# Patient Record
Sex: FEMALE | Race: WHITE | HISPANIC OR LATINO | Employment: UNEMPLOYED | URBAN - METROPOLITAN AREA
[De-identification: names, ages, dates, MRNs, and addresses within clinical notes are randomized per-mention and may not be internally consistent; named-entity substitution may affect disease eponyms.]

---

## 2021-02-05 ENCOUNTER — OFFICE VISIT (OUTPATIENT)
Dept: URGENT CARE | Facility: URGENT CARE | Age: 31
End: 2021-02-05
Payer: MEDICAID

## 2021-02-05 VITALS
HEIGHT: 60 IN | DIASTOLIC BLOOD PRESSURE: 66 MMHG | WEIGHT: 192 LBS | TEMPERATURE: 99.2 F | HEART RATE: 98 BPM | SYSTOLIC BLOOD PRESSURE: 120 MMHG | OXYGEN SATURATION: 99 % | BODY MASS INDEX: 37.69 KG/M2 | RESPIRATION RATE: 14 BRPM

## 2021-02-05 DIAGNOSIS — D50.0 IRON DEFICIENCY ANEMIA DUE TO CHRONIC BLOOD LOSS: ICD-10-CM

## 2021-02-05 DIAGNOSIS — N92.1 MENORRHAGIA WITH IRREGULAR CYCLE: ICD-10-CM

## 2021-02-05 DIAGNOSIS — M25.461 EFFUSION OF RIGHT KNEE: Primary | ICD-10-CM

## 2021-02-05 LAB
BASOPHILS # BLD AUTO: 0 10E9/L (ref 0–0.2)
BASOPHILS NFR BLD AUTO: 0.2 %
DIFFERENTIAL METHOD BLD: ABNORMAL
EOSINOPHIL # BLD AUTO: 0.1 10E9/L (ref 0–0.7)
EOSINOPHIL NFR BLD AUTO: 1.4 %
ERYTHROCYTE [DISTWIDTH] IN BLOOD BY AUTOMATED COUNT: 17.9 % (ref 10–15)
HCT VFR BLD AUTO: 31.5 % (ref 35–47)
HGB BLD-MCNC: 9.2 G/DL (ref 11.7–15.7)
LYMPHOCYTES # BLD AUTO: 3 10E9/L (ref 0.8–5.3)
LYMPHOCYTES NFR BLD AUTO: 32 %
MCH RBC QN AUTO: 19.2 PG (ref 26.5–33)
MCHC RBC AUTO-ENTMCNC: 29.2 G/DL (ref 31.5–36.5)
MCV RBC AUTO: 66 FL (ref 78–100)
MONOCYTES # BLD AUTO: 0.8 10E9/L (ref 0–1.3)
MONOCYTES NFR BLD AUTO: 8.8 %
NEUTROPHILS # BLD AUTO: 5.4 10E9/L (ref 1.6–8.3)
NEUTROPHILS NFR BLD AUTO: 57.6 %
PLATELET # BLD AUTO: 261 10E9/L (ref 150–450)
RBC # BLD AUTO: 4.8 10E12/L (ref 3.8–5.2)
WBC # BLD AUTO: 9.4 10E9/L (ref 4–11)

## 2021-02-05 PROCEDURE — 36415 COLL VENOUS BLD VENIPUNCTURE: CPT | Performed by: FAMILY MEDICINE

## 2021-02-05 PROCEDURE — 99203 OFFICE O/P NEW LOW 30 MIN: CPT | Performed by: FAMILY MEDICINE

## 2021-02-05 PROCEDURE — 85025 COMPLETE CBC W/AUTO DIFF WBC: CPT | Performed by: FAMILY MEDICINE

## 2021-02-05 RX ORDER — FERROUS GLUCONATE 324(38)MG
324 TABLET ORAL
Qty: 30 TABLET | Refills: 0 | Status: SHIPPED | OUTPATIENT
Start: 2021-02-05 | End: 2021-06-18

## 2021-02-05 ASSESSMENT — MIFFLIN-ST. JEOR: SCORE: 1499.47

## 2021-02-05 ASSESSMENT — PAIN SCALES - GENERAL: PAINLEVEL: SEVERE PAIN (6)

## 2021-02-06 NOTE — PROGRESS NOTES
"SUBJECTIVE:  Patient presents with rt knee swelling for one week with no known injury.  No increased activity.  No fever or hx of arthritis.    History reviewed. No pertinent past medical history.  Current Outpatient Medications   Medication Sig Dispense Refill     ferrous gluconate (FERGON) 324 (38 Fe) MG tablet Take 1 tablet (324 mg) by mouth daily (with breakfast) 30 tablet 0     History   Smoking Status     Never Smoker   Smokeless Tobacco     Never Used         OBJECITVE;  /66 (BP Location: Right arm, Patient Position: Chair, Cuff Size: Adult Large)   Pulse 98   Temp 99.2  F (37.3  C) (Tympanic)   Resp 14   Ht 1.511 m (4' 11.5\")   Wt 87.1 kg (192 lb)   LMP 02/05/2021 (Exact Date)   SpO2 99%   Breastfeeding No   BMI 38.13 kg/m    Alert oriented mild distress  Cardiac: normal  Chest: clear  Ortho:  Rt knee effusion without redness or warmth.  Symmetrical effusion without Baker's Cyst.  Bears wt well with slight limp.              jt is stable  CBC:  WBC and diff normal, but  Patient is anemic, further questioning, patient has significant menorrhagia hgb now  9.2  ASSESSMENT:  Rt knee effusion  menorrhagia  Iron deficiency anemia secondary to chronic blood loss.  PLAN:  Elevate knee, neoprene knee support, ferrous gluconate for anemia.  Ortho referral placed.  Follow up with primary care provider re menorrhagia and anemia.                                                                               "

## 2021-02-10 ENCOUNTER — OFFICE VISIT (OUTPATIENT)
Dept: ORTHOPEDICS | Facility: CLINIC | Age: 31
End: 2021-02-10
Payer: MEDICAID

## 2021-02-10 ENCOUNTER — ANCILLARY PROCEDURE (OUTPATIENT)
Dept: GENERAL RADIOLOGY | Facility: CLINIC | Age: 31
End: 2021-02-10
Attending: ORTHOPAEDIC SURGERY
Payer: MEDICAID

## 2021-02-10 VITALS
HEART RATE: 97 BPM | BODY MASS INDEX: 38.72 KG/M2 | WEIGHT: 197.2 LBS | SYSTOLIC BLOOD PRESSURE: 128 MMHG | DIASTOLIC BLOOD PRESSURE: 81 MMHG | HEIGHT: 60 IN

## 2021-02-10 DIAGNOSIS — M25.461 EFFUSION OF RIGHT KNEE: ICD-10-CM

## 2021-02-10 DIAGNOSIS — M25.561 ACUTE PAIN OF RIGHT KNEE: Primary | ICD-10-CM

## 2021-02-10 DIAGNOSIS — M25.561 ACUTE PAIN OF RIGHT KNEE: ICD-10-CM

## 2021-02-10 PROCEDURE — 99203 OFFICE O/P NEW LOW 30 MIN: CPT | Mod: 25 | Performed by: ORTHOPAEDIC SURGERY

## 2021-02-10 PROCEDURE — 20610 DRAIN/INJ JOINT/BURSA W/O US: CPT | Mod: RT | Performed by: ORTHOPAEDIC SURGERY

## 2021-02-10 PROCEDURE — 73562 X-RAY EXAM OF KNEE 3: CPT | Mod: RT | Performed by: RADIOLOGY

## 2021-02-10 RX ORDER — IBUPROFEN 200 MG
200 TABLET ORAL EVERY 4 HOURS PRN
COMMUNITY
End: 2021-02-18

## 2021-02-10 RX ADMIN — BUPIVACAINE HYDROCHLORIDE 4 ML: 2.5 INJECTION, SOLUTION INFILTRATION; PERINEURAL at 17:19

## 2021-02-10 ASSESSMENT — PAIN SCALES - GENERAL: PAINLEVEL: SEVERE PAIN (6)

## 2021-02-10 ASSESSMENT — MIFFLIN-ST. JEOR: SCORE: 1523.05

## 2021-02-10 NOTE — LETTER
"    2/10/2021         RE: Denise Cazares  [address] [facility] [address]        Dear Colleague,    Thank you for referring your patient, Denise Cazares, to the [facility]. Please see a copy of my visit note below.    CHIEF COMPLAINT:   Chief Complaint   Patient presents with     Right Knee - Pain     Onset: about 2 weeks. NKI. Pain came on gradual and has gotten worse. Pain is lateral and superior to knee cap. Pain comes and goes. After having it elevated for a while her leg becomes numb, also with walking. No tx. She was seen      Knee Pain     in  and was given a knee sleeve that she has been wearing. She takes ibuprofen as needed.   .        HISTORY OF PRESENT ILLNESS    Denise Cazares is a 31 year old female seen for evaluation of ongoing right knee pain with no known injury.   Pain has been present for 2 weeks. Has had some swelling. Pain has increased. Locates pain along the side and top of the knee. Pain comes and goes. When she elevates, the leg becomes numb. The leg will become numb with walking as well. She's been wearing a knee sleeve from urgent care. She takes ibuprofen as needed. She thinks the swelling has improved the past couple of days and is able to lift her leg and move around easier. Ok at rest.    Denies fevers, chills, night sweats. She's not felt ill or had any recent illness.    Reports her knees \"crunch\" frequently but no issues in the past.    Present symptoms: pain diffuse, pain dull/achy , moderate pain, moderate  swelling, no catching/popping, no locking, no giving way.    Pain severity: 6/10  Frequency of symptoms: are constant  Exacerbating Factors: weight bearing, stairs, bending the knee.  Relieving Factors: elevation  Pain while at rest: No   Numbness or tingling: Yes   Patient has tried:     NSAIDS: Yes      Physical Therapy: No      Activity modification: Yes      Bracing: Yes      Injections: No      Ice: Yes      Assistive " "device:  No      Other PMH:  has no past medical history on file.  There is no problem list on file for this patient.      Surgical Hx:  has no past surgical history on file.    Medications:   Current Outpatient Medications:      ferrous gluconate (FERGON) 324 (38 Fe) MG tablet, Take 1 tablet (324 mg) by mouth daily (with breakfast), Disp: 30 tablet, Rfl: 0    Allergies: No Known Allergies    Social Hx: unemployed.   reports that she has never smoked. She has never used smokeless tobacco. She reports current alcohol use. She reports that she does not use drugs.    Family Hx: family history is not on file.    REVIEW OF SYSTEMS: 10 point ROS neg other than the symptoms noted above in the HPI and PMH. Notables include  CONSTITUTIONAL:NEGATIVE for fever, chills, change in weight  INTEGUMENTARY/SKIN: NEGATIVE for worrisome rashes, moles or lesions  MUSCULOSKELETAL:See HPI above  NEURO: NEGATIVE for weakness, dizziness or paresthesias    PHYSICAL EXAM:  /81   Pulse 97   Ht 1.511 m (4' 11.5\")   Wt 89.4 kg (197 lb 3.2 oz)   LMP 02/05/2021 (Exact Date)   BMI 39.16 kg/m     GENERAL APPEARANCE: healthy, alert, no distress  SKIN: no suspicious lesions or rashes  NEURO: Normal strength and tone, mentation intact and speech normal  PSYCH:  mentation appears normal and affect normal, not anxious  RESPIRATORY: No increased work of breathing.  HANDS: no clubbing, nail pitting  LYMPH: no palpable popliteal lymphadenopathy.    BILATERAL LOWER EXTREMITIES:  Gait: antalgic favoring right   Alignment: valgus  No gross deformities or masses.  No Quad atrophy, strength normal.  Intact sensation deep peroneal nerve, superficial peroneal nerve, med/lat tibial nerve, sural nerve, saphenous nerve  Intact EHL, EDL, TA, FHL, GS, quadriceps hamstrings and hip flexors  Toes warm and well perfused, brisk capillary refill. Palpable 2+ dp pulses.  Bilateral calf soft and nttp or squeeze.  DTRs: achilles 2+, patella 2+.  Edema: " trace    LEFT KNEE EXAM:    Skin: intact, no ecchymosis or erythema  ROM: full extension to 130+ flexion  Tight hamstrings on straight leg raise.  Effusion: none  Tender: NTTP med/lat joint line, anterior or posterior knee  McMurrays: negative    MCL: stable, and non-painful at both 0 and 30 degrees knee flexion  Varus stress: stable, and non-painful at both 0 and 30 degrees knee flexion  Lachmans: neg, firm endpoint  Posterior Drawer stable  Patellofemoral joint:                Apprehension: negative              Crepitations: minimal    RIGHT KNEE EXAM:    Skin: intact, no ecchymosis or erythema  ROM: 5 extension to 90 flexion, discomfort only at extremes of motion relatively painfree from 5-90 degrees.  Tight hamstrings on straight leg raise.  Effusion: large  Tender: mild diffuse  McMurrays: negative    MCL: stable, and non-painful at both 0 and 30 degrees knee flexion  Varus stress: stable, and non-painful at both 0 and 30 degrees knee flexion  Lachmans: neg, firm endpoint  Posterior Drawer stable  Patellofemoral joint:                Apprehension: negative              Crepitations: minimal    X-RAY:  3 views right knee from 2/10/2021 were reviewed in clinic today. On my review, no obvious fractures or dislocations. Large knee joint effusion. No lipohemarthrosis. No fractures are evident. No joint space narrowing. Normal patellar alignment.          ASSESSMENT/PLAN: Denise Cazares is a 31 year old female with acute right knee pain, effusion, swelling.     * reviewed imaging studies with patient  * unclear as to why onset of swelling. Likely aseptic effusion, synovitis.  * discussed options with observation if seems to be improving, otherwise consider aspiration of fluid today and send to the lab for evaluation of such things as infection, gout, other.  * patient needed time to think about her options, as she's concerned of the costs of aspiration and any lab work.  * at this time, she decided to proceed  with aspiration but did not want the extra cost of labs, understanding there is a risk of infection that could be missed if we don't get labs to rule out. Now, my suspicion is low for infection given clinical exam, but not zero percent.  * aspirate fluid looked benign appearing.    Treatment:    * rest, sitting  * Activity modification - avoid impact activities or activities that aggravate symptoms. Avoid repetitive activities.  * NSAIDS (non-steroidal anti-inflammatory medications; e.g. Aleve, advil, motrin, ibuprofen) - regular use for inflammation ( twice daily or three times daily), with food, as long as no contra-indications Please discuss with primary care doctor if needed  * ice, 15-20 minutes at a time several times a day or as needed.  * Strengthening of quadriceps muscles  * Physical Therapy for strengthening, stretching and range of motion exercises of legs  * Tylenol as needed for pain, consider Tylenol arthritis or similar  * Weight loss: Weight loss:  Body mass index is 39.16 kg/m .. weight loss benefits, not only for the current pain symptoms, but also overall health. Recommend a good diet plan that works for the patient, with the assistance of a dietician or primary care doctor as needed. Also, a good, low-impact exercise program for at least 20 minutes per day, 3 times per week, such as exercise bike, elliptical , or pool.  * Exercise: low impact such as stationary bike, elliptical, pool.  * aspiration: risks and perceived benefits discussed today. Patient elects to proceed with aspiration  * Bracing: bracing the knee may offer some relief of symptoms when worn and provide some stability.  * over the counter supplements such as glucosamine and chondroitin sulfate may help with joint pain.  * topical ointments may help as well    * return to clinic as needed. If pain returns or develops fevers or concerns of infection to either return to clinic immediately or present to local emergency  room.          Forrest Jerome M.D., M.S.  Dept. of Orthopaedic Surgery  [facility]    Large Joint Injection/Arthocentesis: R knee joint    Date/Time: 2/10/2021 5:19 PM  Performed by: Shayne Schultz PA  Authorized by: Forrest Jerome MD     Indications:  Pain and osteoarthritis  Needle Size:  22 G  Guidance: landmark guided    Approach:  Superolateral  Location:  Knee      Medications:  4 mL bupivacaine 0.25 %  Aspirate amount (mL):  130  Aspirate:  Yellow and blood-tinged  Aspirate comment:  Patient declined to have specimen sent to lab, due to financial concerns.  Outcome:  Tolerated well, no immediate complications  Procedure discussed: discussed risks, benefits, and alternatives    Consent Given by:  Patient  Prep: patient was prepped and draped in usual sterile fashion     I had a nice conversation with patient and spouse about treatment options while acknowledging cost differences associated with different choices in the absence of health insurance. It was decided that the best decision at the time was to aspirate the knee and not send the fluid for lab analysis. We spoke about things to watch for if the knee might be infected, and it was also understood that if the effusion comes back or any other concerning symptoms that we would need to aspirate and send for lab analysis. Everyone was in agreement with this plan.            Again, thank you for allowing me to participate in the care of your patient.        Sincerely,        Forrest Jerome MD

## 2021-02-10 NOTE — PROGRESS NOTES
"CHIEF COMPLAINT:   Chief Complaint   Patient presents with     Right Knee - Pain     Onset: about 2 weeks. NKI. Pain came on gradual and has gotten worse. Pain is lateral and superior to knee cap. Pain comes and goes. After having it elevated for a while her leg becomes numb, also with walking. No tx. She was seen      Knee Pain     in  and was given a knee sleeve that she has been wearing. She takes ibuprofen as needed.   .        HISTORY OF PRESENT ILLNESS    Denise Cazares is a 31 year old female seen for evaluation of ongoing right knee pain with no known injury.   Pain has been present for 2 weeks. Has had some swelling. Pain has increased. Locates pain along the side and top of the knee. Pain comes and goes. When she elevates, the leg becomes numb. The leg will become numb with walking as well. She's been wearing a knee sleeve from urgent care. She takes ibuprofen as needed. She thinks the swelling has improved the past couple of days and is able to lift her leg and move around easier. Ok at rest.    Denies fevers, chills, night sweats. She's not felt ill or had any recent illness.    Reports her knees \"crunch\" frequently but no issues in the past.    Present symptoms: pain diffuse, pain dull/achy , moderate pain, moderate  swelling, no catching/popping, no locking, no giving way.    Pain severity: 6/10  Frequency of symptoms: are constant  Exacerbating Factors: weight bearing, stairs, bending the knee.  Relieving Factors: elevation  Pain while at rest: No   Numbness or tingling: Yes   Patient has tried:     NSAIDS: Yes      Physical Therapy: No      Activity modification: Yes      Bracing: Yes      Injections: No      Ice: Yes      Assistive device:  No      Other PMH:  has no past medical history on file.  There is no problem list on file for this patient.      Surgical Hx:  has no past surgical history on file.    Medications:   Current Outpatient Medications:      ferrous gluconate (FERGON) 324 (38 " "Fe) MG tablet, Take 1 tablet (324 mg) by mouth daily (with breakfast), Disp: 30 tablet, Rfl: 0    Allergies: No Known Allergies    Social Hx: unemployed.   reports that she has never smoked. She has never used smokeless tobacco. She reports current alcohol use. She reports that she does not use drugs.    Family Hx: family history is not on file.    REVIEW OF SYSTEMS: 10 point ROS neg other than the symptoms noted above in the HPI and PMH. Notables include  CONSTITUTIONAL:NEGATIVE for fever, chills, change in weight  INTEGUMENTARY/SKIN: NEGATIVE for worrisome rashes, moles or lesions  MUSCULOSKELETAL:See HPI above  NEURO: NEGATIVE for weakness, dizziness or paresthesias    PHYSICAL EXAM:  /81   Pulse 97   Ht 1.511 m (4' 11.5\")   Wt 89.4 kg (197 lb 3.2 oz)   LMP 02/05/2021 (Exact Date)   BMI 39.16 kg/m     GENERAL APPEARANCE: healthy, alert, no distress  SKIN: no suspicious lesions or rashes  NEURO: Normal strength and tone, mentation intact and speech normal  PSYCH:  mentation appears normal and affect normal, not anxious  RESPIRATORY: No increased work of breathing.  HANDS: no clubbing, nail pitting  LYMPH: no palpable popliteal lymphadenopathy.    BILATERAL LOWER EXTREMITIES:  Gait: antalgic favoring right   Alignment: valgus  No gross deformities or masses.  No Quad atrophy, strength normal.  Intact sensation deep peroneal nerve, superficial peroneal nerve, med/lat tibial nerve, sural nerve, saphenous nerve  Intact EHL, EDL, TA, FHL, GS, quadriceps hamstrings and hip flexors  Toes warm and well perfused, brisk capillary refill. Palpable 2+ dp pulses.  Bilateral calf soft and nttp or squeeze.  DTRs: achilles 2+, patella 2+.  Edema: trace    LEFT KNEE EXAM:    Skin: intact, no ecchymosis or erythema  ROM: full extension to 130+ flexion  Tight hamstrings on straight leg raise.  Effusion: none  Tender: NTTP med/lat joint line, anterior or posterior knee  McMurrays: negative    MCL: stable, and non-painful " at both 0 and 30 degrees knee flexion  Varus stress: stable, and non-painful at both 0 and 30 degrees knee flexion  Lachmans: neg, firm endpoint  Posterior Drawer stable  Patellofemoral joint:                Apprehension: negative              Crepitations: minimal    RIGHT KNEE EXAM:    Skin: intact, no ecchymosis or erythema  ROM: 5 extension to 90 flexion, discomfort only at extremes of motion relatively painfree from 5-90 degrees.  Tight hamstrings on straight leg raise.  Effusion: large  Tender: mild diffuse  McMurrays: negative    MCL: stable, and non-painful at both 0 and 30 degrees knee flexion  Varus stress: stable, and non-painful at both 0 and 30 degrees knee flexion  Lachmans: neg, firm endpoint  Posterior Drawer stable  Patellofemoral joint:                Apprehension: negative              Crepitations: minimal    X-RAY:  3 views right knee from 2/10/2021 were reviewed in clinic today. On my review, no obvious fractures or dislocations. Large knee joint effusion. No lipohemarthrosis. No fractures are evident. No joint space narrowing. Normal patellar alignment.          ASSESSMENT/PLAN: Denise Cazares is a 31 year old female with acute right knee pain, effusion, swelling.     * reviewed imaging studies with patient  * unclear as to why onset of swelling. Likely aseptic effusion, synovitis.  * discussed options with observation if seems to be improving, otherwise consider aspiration of fluid today and send to the lab for evaluation of such things as infection, gout, other.  * patient needed time to think about her options, as she's concerned of the costs of aspiration and any lab work.  * at this time, she decided to proceed with aspiration but did not want the extra cost of labs, understanding there is a risk of infection that could be missed if we don't get labs to rule out. Now, my suspicion is low for infection given clinical exam, but not zero percent.  * aspirate fluid looked benign  appearing.    Treatment:    * rest, sitting  * Activity modification - avoid impact activities or activities that aggravate symptoms. Avoid repetitive activities.  * NSAIDS (non-steroidal anti-inflammatory medications; e.g. Aleve, advil, motrin, ibuprofen) - regular use for inflammation ( twice daily or three times daily), with food, as long as no contra-indications Please discuss with primary care doctor if needed  * ice, 15-20 minutes at a time several times a day or as needed.  * Strengthening of quadriceps muscles  * Physical Therapy for strengthening, stretching and range of motion exercises of legs  * Tylenol as needed for pain, consider Tylenol arthritis or similar  * Weight loss: Weight loss:  Body mass index is 39.16 kg/m .. weight loss benefits, not only for the current pain symptoms, but also overall health. Recommend a good diet plan that works for the patient, with the assistance of a dietician or primary care doctor as needed. Also, a good, low-impact exercise program for at least 20 minutes per day, 3 times per week, such as exercise bike, elliptical , or pool.  * Exercise: low impact such as stationary bike, elliptical, pool.  * aspiration: risks and perceived benefits discussed today. Patient elects to proceed with aspiration  * Bracing: bracing the knee may offer some relief of symptoms when worn and provide some stability.  * over the counter supplements such as glucosamine and chondroitin sulfate may help with joint pain.  * topical ointments may help as well    * return to clinic as needed. If pain returns or develops fevers or concerns of infection to either return to clinic immediately or present to local emergency room.          Forrest Jerome M.D., M.S.  Dept. of Orthopaedic Surgery  [facility]    Large Joint Injection/Arthocentesis: R knee joint    Date/Time: 2/10/2021 5:19 PM  Performed by: Shayne Schultz PA  Authorized by: Forrest Jerome MD     Indications:   Pain and osteoarthritis  Needle Size:  22 G  Guidance: landmark guided    Approach:  Superolateral  Location:  Knee      Medications:  4 mL bupivacaine 0.25 %  Aspirate amount (mL):  130  Aspirate:  Yellow and blood-tinged  Aspirate comment:  Patient declined to have specimen sent to lab, due to financial concerns.  Outcome:  Tolerated well, no immediate complications  Procedure discussed: discussed risks, benefits, and alternatives    Consent Given by:  Patient  Prep: patient was prepped and draped in usual sterile fashion     I had a nice conversation with patient and spouse about treatment options while acknowledging cost differences associated with different choices in the absence of health insurance. It was decided that the best decision at the time was to aspirate the knee and not send the fluid for lab analysis. We spoke about things to watch for if the knee might be infected, and it was also understood that if the effusion comes back or any other concerning symptoms that we would need to aspirate and send for lab analysis. Everyone was in agreement with this plan.

## 2021-02-11 RX ORDER — BUPIVACAINE HYDROCHLORIDE 2.5 MG/ML
4 INJECTION, SOLUTION INFILTRATION; PERINEURAL
Status: DISCONTINUED | OUTPATIENT
Start: 2021-02-10 | End: 2021-10-14

## 2021-02-18 ENCOUNTER — OFFICE VISIT (OUTPATIENT)
Dept: FAMILY MEDICINE | Facility: CLINIC | Age: 31
End: 2021-02-18
Payer: MEDICAID

## 2021-02-18 ENCOUNTER — TELEPHONE (OUTPATIENT)
Dept: OBGYN | Facility: CLINIC | Age: 31
End: 2021-02-18

## 2021-02-18 VITALS
SYSTOLIC BLOOD PRESSURE: 118 MMHG | WEIGHT: 194 LBS | DIASTOLIC BLOOD PRESSURE: 58 MMHG | RESPIRATION RATE: 16 BRPM | BODY MASS INDEX: 38.09 KG/M2 | OXYGEN SATURATION: 100 % | HEIGHT: 60 IN | TEMPERATURE: 98.5 F | HEART RATE: 76 BPM

## 2021-02-18 DIAGNOSIS — E03.9 HYPOTHYROIDISM, UNSPECIFIED TYPE: ICD-10-CM

## 2021-02-18 DIAGNOSIS — E03.9 HYPOTHYROIDISM, UNSPECIFIED TYPE: Primary | ICD-10-CM

## 2021-02-18 DIAGNOSIS — Z32.01 PREGNANCY, CONFIRMED, NOT FIRST: ICD-10-CM

## 2021-02-18 DIAGNOSIS — N91.2 AMENORRHEA: Primary | ICD-10-CM

## 2021-02-18 LAB
HCG UR QL: POSITIVE
T4 FREE SERPL-MCNC: 0.72 NG/DL (ref 0.76–1.46)
TSH SERPL DL<=0.005 MIU/L-ACNC: 13.84 MU/L (ref 0.4–4)

## 2021-02-18 PROCEDURE — 81025 URINE PREGNANCY TEST: CPT | Performed by: FAMILY MEDICINE

## 2021-02-18 PROCEDURE — 84439 ASSAY OF FREE THYROXINE: CPT | Performed by: FAMILY MEDICINE

## 2021-02-18 PROCEDURE — 99214 OFFICE O/P EST MOD 30 MIN: CPT | Performed by: FAMILY MEDICINE

## 2021-02-18 PROCEDURE — 84443 ASSAY THYROID STIM HORMONE: CPT | Performed by: FAMILY MEDICINE

## 2021-02-18 PROCEDURE — 36415 COLL VENOUS BLD VENIPUNCTURE: CPT | Performed by: FAMILY MEDICINE

## 2021-02-18 RX ORDER — PRENATAL VIT/IRON FUM/FOLIC AC 27MG-0.8MG
1 TABLET ORAL DAILY
Qty: 90 TABLET | Refills: 3 | Status: SHIPPED | OUTPATIENT
Start: 2021-02-18 | End: 2021-12-14

## 2021-02-18 RX ORDER — LEVOTHYROXINE SODIUM 125 UG/1
125 TABLET ORAL DAILY
Qty: 90 TABLET | Refills: 1 | Status: SHIPPED | OUTPATIENT
Start: 2021-02-18 | End: 2021-09-03

## 2021-02-18 ASSESSMENT — MIFFLIN-ST. JEOR: SCORE: 1508.54

## 2021-02-18 NOTE — PATIENT INSTRUCTIONS
"  Patient Education     Pregnancy: Your First Trimester Changes  The first trimester is a time of rapid development for your baby. Because your baby is growing so quickly, it is important that you start a healthy lifestyle right away. By the end of the first trimester, your baby has formed all of its major body organs and weighs just over an ounce.      Actual size of baby is 1/4\"    Month 1 (weeks 1 to 4)  The placenta (the organ that nourishes your baby) begins to form. The brain, spinal cord, heart, gastrointestinal tract, and lungs begin to develop. Your baby is about   inch long by the end of the first month.      Actual size of baby is 1\"    Month 2 (weeks 5 to 8)  All of your baby s major body organs form. The face, fingers, toes, ears, and eyes appear. By the end of the month, your baby is about 1 inch long.      Actual size of baby is 3\"      Month 3 (weeks 9 to 12)  Your baby can open and close its fists and mouth. The sexual organs begin to form. As the first trimester ends, your baby is about 3 inches long.   [employer] last reviewed this educational content on 8/1/2020 2000-2020 The [employer]. [address] [place] [address], [place] [address]. All rights reserved. This information is not intended as a substitute for professional medical care. Always follow your healthcare professional's instructions.           "

## 2021-02-18 NOTE — PROGRESS NOTES
"  Assessment & Plan     Amenorrhea  Pregnancy test positive, history of irregular menstrual cycle, last menstrual cycle probably in November last year, had some spotting 2 weeks ago. .  No nausea, vomiting, abdominal pain, vaginal bleeding, headache or other relevant systemic symptoms.  Recommended to use prenatal vitamins and continue iron supplement.  Dietary counseling and written information provided  - HCG Qual, Urine ([id number])  - Prenatal Vit-Fe Fumarate-FA (PRENATAL MULTIVITAMIN W/IRON) 27-0.8 MG tablet; Take 1 tablet by mouth daily  - OB/GYN REFERRAL    Hypothyroidism, unspecified type  History of hypothyroidism, stopped taking levothyroxine due to lack of insurance.  TSH levels elevated.  Levothyroxine prescribed, needs repeat testing in 6 to 8 weeks  - TSH with free T4 reflex    Pregnancy, confirmed, not first  As above        30 minutes spent on the date of the encounter doing chart review, review of test results, interpretation of tests, patient visit and documentation        Patient Instructions       Patient Education     Pregnancy: Your First Trimester Changes  The first trimester is a time of rapid development for your baby. Because your baby is growing so quickly, it is important that you start a healthy lifestyle right away. By the end of the first trimester, your baby has formed all of its major body organs and weighs just over an ounce.      Actual size of baby is 1/4\"    Month 1 (weeks 1 to 4)  The placenta (the organ that nourishes your baby) begins to form. The brain, spinal cord, heart, gastrointestinal tract, and lungs begin to develop. Your baby is about   inch long by the end of the first month.      Actual size of baby is 1\"    Month 2 (weeks 5 to 8)  All of your baby s major body organs form. The face, fingers, toes, ears, and eyes appear. By the end of the month, your baby is about 1 inch long.      Actual size of baby is 3\"      Month 3 (weeks 9 to 12)  Your baby can open and close " "its fists and mouth. The sexual organs begin to form. As the first trimester ends, your baby is about 3 inches long.   [employer] last reviewed this educational content on 8/1/2020 2000-2020 [employer]. [address] [place] [address], [place] [address]. All rights reserved. This information is not intended as a substitute for professional medical care. Always follow your healthcare professional's instructions.             Yovani Burgess MD  [facility]    Shirley Walker is a 31 year old who presents for the following health issues  accompanied by her spouse:    HPI     Concern - irregular menses   Onset: LMP 12/2020 Spotted 02/15/2021.Rule out pregnancy.  History of irregular menstrual bleeding, had some spotting 2 weeks ago, last menstrual cycle in November or December.  History of hypothyroidism, not taking levothyroxine for months due to lack of insurance.  No nausea, vomiting, abdominal pain, vaginal bleeding, headache or other relevant systemic symptoms.    {Review of Systems   Constitutional, HEENT, cardiovascular, pulmonary, gi and gu systems are negative, except as otherwise noted.      Objective    /58   Pulse 76   Temp 98.5  F (36.9  C) (Tympanic)   Resp 16   Ht 1.511 m (4' 11.5\")   Wt 88 kg (194 lb)   LMP 02/05/2021 (Exact Date)   SpO2 100%   BMI 38.53 kg/m    Body mass index is 38.53 kg/m .  Physical Exam   GENERAL: alert and no distress  NECK: no adenopathy, no asymmetry, masses, or scars and thyroid normal to palpation  RESP: lungs clear to auscultation - no rales, rhonchi or wheezes  CV: regular rate and rhythm, normal S1 S2, no S3 or S4, no murmur, click or rub, no peripheral edema and peripheral pulses strong  ABDOMEN: soft, nontender, no hepatosplenomegaly, no masses and bowel sounds normal  MS: no gross musculoskeletal defects noted, no edema    Results for orders placed or performed in visit on 02/18/21   HCG Qual, Urine ([id number])     Status: " Abnormal   Result Value Ref Range    HCG Qual Urine Positive (A) NEG^Negative   TSH with free T4 reflex     Status: Abnormal   Result Value Ref Range    TSH 13.84 (H) 0.40 - 4.00 mU/L   T4 free     Status: Abnormal   Result Value Ref Range    T4 Free 0.72 (L) 0.76 - 1.46 ng/dL         ----- Ambulatory Services Attestations for Billing on Time -----

## 2021-02-18 NOTE — TELEPHONE ENCOUNTER
Reason for Call:  Other appointment    Detailed comments: Pt is calling to set up prenatal care.  Lmp-beginning of Dec-pt did not know exact date. Received + test today-see appts.  Phone Number Patient can be reached at: Cell number on file:    Telephone Information:   Mobile [phone]       Best Time: any    Can we leave a detailed message on this number? YES    Call taken on 2/18/2021 at 7:49 AM by Paige Lopez

## 2021-02-18 NOTE — NURSING NOTE
"Chief Complaint   Patient presents with     Amenorrhea       Initial /58   Pulse 76   Temp 98.5  F (36.9  C) (Tympanic)   Resp 16   Ht 1.511 m (4' 11.5\")   Wt 88 kg (194 lb)   LMP 02/05/2021 (Exact Date)   SpO2 100%   BMI 38.53 kg/m   Estimated body mass index is 38.53 kg/m  as calculated from the following:    Height as of this encounter: 1.511 m (4' 11.5\").    Weight as of this encounter: 88 kg (194 lb).    Patient presents to the clinic using No DME    Health Maintenance that is potentially due pending provider review:  Pap Smear    Pt will schedule Pap appt.    Is there anyone who you would like to be able to receive your results? No  If yes have patient fill out NISHI    "

## 2021-02-24 ENCOUNTER — PRENATAL OFFICE VISIT (OUTPATIENT)
Dept: OBGYN | Facility: CLINIC | Age: 31
End: 2021-02-24

## 2021-02-24 ENCOUNTER — APPOINTMENT (OUTPATIENT)
Dept: OBGYN | Facility: CLINIC | Age: 31
End: 2021-02-24

## 2021-02-24 DIAGNOSIS — Z34.80 PRENATAL CARE, SUBSEQUENT PREGNANCY: ICD-10-CM

## 2021-02-24 PROCEDURE — 99207 PR NO CHARGE NURSE ONLY: CPT | Performed by: OBSTETRICS & GYNECOLOGY

## 2021-02-24 SDOH — HEALTH STABILITY: MENTAL HEALTH: HOW OFTEN DO YOU HAVE A DRINK CONTAINING ALCOHOL?: NOT ASKED

## 2021-02-24 SDOH — HEALTH STABILITY: MENTAL HEALTH: HOW OFTEN DO YOU HAVE 6 OR MORE DRINKS ON ONE OCCASION?: NOT ASKED

## 2021-02-24 SDOH — HEALTH STABILITY: MENTAL HEALTH: HOW MANY STANDARD DRINKS CONTAINING ALCOHOL DO YOU HAVE ON A TYPICAL DAY?: NOT ASKED

## 2021-02-26 ENCOUNTER — PRENATAL OFFICE VISIT (OUTPATIENT)
Dept: OBGYN | Facility: CLINIC | Age: 31
End: 2021-02-26
Payer: MEDICAID

## 2021-02-26 VITALS
HEART RATE: 75 BPM | HEIGHT: 60 IN | RESPIRATION RATE: 16 BRPM | SYSTOLIC BLOOD PRESSURE: 125 MMHG | TEMPERATURE: 97.1 F | WEIGHT: 193.8 LBS | DIASTOLIC BLOOD PRESSURE: 74 MMHG | BODY MASS INDEX: 38.05 KG/M2

## 2021-02-26 DIAGNOSIS — Z34.81 PRENATAL CARE, SUBSEQUENT PREGNANCY IN FIRST TRIMESTER: Primary | ICD-10-CM

## 2021-02-26 PROBLEM — M25.561 RIGHT KNEE PAIN: Status: ACTIVE | Noted: 2021-02-26

## 2021-02-26 LAB
ALBUMIN UR-MCNC: NEGATIVE MG/DL
APPEARANCE UR: CLEAR
BILIRUB UR QL STRIP: NEGATIVE
COLOR UR AUTO: YELLOW
GLUCOSE UR STRIP-MCNC: NEGATIVE MG/DL
HGB UR QL STRIP: NEGATIVE
KETONES UR STRIP-MCNC: ABNORMAL MG/DL
LEUKOCYTE ESTERASE UR QL STRIP: ABNORMAL
MUCOUS THREADS #/AREA URNS LPF: PRESENT /LPF
NITRATE UR QL: NEGATIVE
NON-SQ EPI CELLS #/AREA URNS LPF: ABNORMAL /LPF
PH UR STRIP: 5.5 PH (ref 5–7)
RBC #/AREA URNS AUTO: ABNORMAL /HPF
SOURCE: ABNORMAL
SP GR UR STRIP: 1.02 (ref 1–1.03)
UROBILINOGEN UR STRIP-ACNC: 1 EU/DL (ref 0.2–1)
WBC #/AREA URNS AUTO: ABNORMAL /HPF

## 2021-02-26 PROCEDURE — 99N1016 PR STATISTIC CYTO-THIN LAYER QC G0145: Performed by: OBSTETRICS & GYNECOLOGY

## 2021-02-26 PROCEDURE — 99203 OFFICE O/P NEW LOW 30 MIN: CPT | Mod: 25 | Performed by: OBSTETRICS & GYNECOLOGY

## 2021-02-26 PROCEDURE — 87624 HPV HI-RISK TYP POOLED RSLT: CPT | Performed by: OBSTETRICS & GYNECOLOGY

## 2021-02-26 PROCEDURE — 87591 N.GONORRHOEAE DNA AMP PROB: CPT | Performed by: OBSTETRICS & GYNECOLOGY

## 2021-02-26 PROCEDURE — 87491 CHLMYD TRACH DNA AMP PROBE: CPT | Performed by: OBSTETRICS & GYNECOLOGY

## 2021-02-26 PROCEDURE — 87086 URINE CULTURE/COLONY COUNT: CPT | Performed by: OBSTETRICS & GYNECOLOGY

## 2021-02-26 PROCEDURE — G0145 SCR C/V CYTO,THINLAYER,RESCR: HCPCS | Performed by: OBSTETRICS & GYNECOLOGY

## 2021-02-26 PROCEDURE — 81001 URINALYSIS AUTO W/SCOPE: CPT | Performed by: OBSTETRICS & GYNECOLOGY

## 2021-02-26 ASSESSMENT — MIFFLIN-ST. JEOR: SCORE: 1507.63

## 2021-02-26 NOTE — LETTER
[facility] [place]  [address] [address]  Phone: [phone]      February 26, 2021      Denise Cazares  [address] [address]              To whom it may concern:    Denise Cazares is being seen in our clinic for prenatal care today.  Her Estimated Date of Delivery: Oct 20, 2021, currently 6w2d pre[place] pregnancy.        Sincerely,    Brigitte Storey MD/arg

## 2021-02-26 NOTE — NURSING NOTE
"Initial /74 (BP Location: Right arm, Patient Position: Chair, Cuff Size: Adult Regular)   Pulse 75   Temp 97.1  F (36.2  C) (Tympanic)   Resp 16   Ht 1.511 m (4' 11.5\")   Wt 87.9 kg (193 lb 12.8 oz)   LMP 12/03/2020 (LMP Unknown)   Breastfeeding No   BMI 38.49 kg/m   Estimated body mass index is 38.49 kg/m  as calculated from the following:    Height as of this encounter: 1.511 m (4' 11.5\").    Weight as of this encounter: 87.9 kg (193 lb 12.8 oz). .    Dinora Morrison, [facility]    "

## 2021-02-26 NOTE — PROGRESS NOTES
"Denise is a 31 year old  @ 12.1 weeks here for new ob visit.  Unplanned and irregular menses.  Very welcome.       ROS: Ten point review of systems was reviewed and negative except the above.  Current Issues include: fatigue    OBhx:  x 2, Molar x 1    Past Medical History:   Diagnosis Date     Anemia      Chickenpox      Hypothyroidism      Past Surgical History:   Procedure Laterality Date     LAPAROSCOPIC CHOLECYSTECTOMY       TONSILLECTOMY       Patient Active Problem List    Diagnosis Date Noted     Prenatal care, subsequent pregnancy 2021     Priority: Medium      No Known Allergies  ferrous gluconate (FERGON) 324 (38 Fe) MG tablet, Take 1 tablet (324 mg) by mouth daily (with breakfast) (Patient not taking: Reported on 2021)  levothyroxine (SYNTHROID/LEVOTHROID) 125 MCG tablet, Take 1 tablet (125 mcg) by mouth daily  Prenatal Vit-Fe Fumarate-FA (PRENATAL MULTIVITAMIN W/IRON) 27-0.8 MG tablet, Take 1 tablet by mouth daily    bupivacaine (MARCAINE) 0.25 % injection 4 mL      FH: Her family history was reviewed and documented in its appropriate chart area.    Past Medical History of Father of Baby:   No significant medical history    Physical Exam: /74 (BP Location: Right arm, Patient Position: Chair, Cuff Size: Adult Regular)   Pulse 75   Temp 97.1  F (36.2  C) (Tympanic)   Resp 16   Ht 1.511 m (4' 11.5\")   Wt 87.9 kg (193 lb 12.8 oz)   LMP 2020 (LMP Unknown)   Breastfeeding No   BMI 38.49 kg/m    General: Well developed, well nourished female and Obese  Skin: Normal  HEENT: Normal  Abdomen: Benign and Soft, flat, non-tender   Extremities: Normal  Neurological: Normal   Perineum: Normal   Vulva: Normal  Vagina: Normal mucosa, scant blood  Cervix: Parous, closed, mobile, no discharge     Transvaginal ultrasound was performed.  A viable intrauterine pregnancy was seen.  CRL consistent with 6 weeks, 2 days.  Fetal heart motion was visualized.    EDC by LMP: 2021   EDC by " sono:  10/20/2021  Final EDC: 10/20/2021    A/P 31 year old  at  6.2 weeks    1. Discussed physician coverage, tertiary support, diet, exercise, weight gain, schedule of visits, routine and indicated ultrasounds, and childbirth education.    2. Options for  testing for chromosomal and birth defects were discussed with the patient including nuchal lucency/blood marker testing in the first trimester and quad screening and/or Level 2 ultrasound in the second trimester.  We discussed that these are screening tests and not diagnostic tests and that false positives and negatives are a distinct possibility.  We discussed that follow up diagnostic testing would include chorionic villus sampling or amniocentesis depending on gestational age.    Also discussed the option of NIPT, which is less invasive, more accurate, but not always covered by insurance.      3. Prenatal labs, pap, GC, Chlamydia    4. Prenatal Vitamins    5. Hypothyroid: recently restarted on medication.  Will reassess in 6-8 weeks.      Brigitte Storey M.D.

## 2021-02-27 LAB
BACTERIA SPEC CULT: NORMAL
C TRACH DNA SPEC QL NAA+PROBE: NEGATIVE
Lab: NORMAL
N GONORRHOEA DNA SPEC QL NAA+PROBE: NEGATIVE
SPECIMEN SOURCE: NORMAL

## 2021-03-04 LAB
COPATH REPORT: NORMAL
PAP: NORMAL

## 2021-03-05 LAB
FINAL DIAGNOSIS: NORMAL
HPV HR 12 DNA CVX QL NAA+PROBE: NEGATIVE
HPV16 DNA SPEC QL NAA+PROBE: NEGATIVE
HPV18 DNA SPEC QL NAA+PROBE: NEGATIVE
SPECIMEN DESCRIPTION: NORMAL
SPECIMEN SOURCE CVX/VAG CYTO: NORMAL

## 2021-03-07 ENCOUNTER — HEALTH MAINTENANCE LETTER (OUTPATIENT)
Age: 31
End: 2021-03-07

## 2021-03-12 ENCOUNTER — PRENATAL OFFICE VISIT (OUTPATIENT)
Dept: OBGYN | Facility: CLINIC | Age: 31
End: 2021-03-12
Payer: MEDICAID

## 2021-03-12 VITALS
HEART RATE: 73 BPM | HEIGHT: 60 IN | WEIGHT: 196.8 LBS | BODY MASS INDEX: 38.64 KG/M2 | TEMPERATURE: 98.4 F | RESPIRATION RATE: 14 BRPM | SYSTOLIC BLOOD PRESSURE: 119 MMHG | DIASTOLIC BLOOD PRESSURE: 72 MMHG

## 2021-03-12 DIAGNOSIS — Z23 NEED FOR PROPHYLACTIC VACCINATION AND INOCULATION AGAINST INFLUENZA: ICD-10-CM

## 2021-03-12 DIAGNOSIS — E03.9 HYPOTHYROIDISM, UNSPECIFIED TYPE: ICD-10-CM

## 2021-03-12 DIAGNOSIS — Z34.81 PRENATAL CARE, SUBSEQUENT PREGNANCY IN FIRST TRIMESTER: Primary | ICD-10-CM

## 2021-03-12 LAB
ABO + RH BLD: NORMAL
ABO + RH BLD: NORMAL
BLD GP AB SCN SERPL QL: NORMAL
BLOOD BANK CMNT PATIENT-IMP: NORMAL
ERYTHROCYTE [DISTWIDTH] IN BLOOD BY AUTOMATED COUNT: 22.5 % (ref 10–15)
HCT VFR BLD AUTO: 32.9 % (ref 35–47)
HGB BLD-MCNC: 9.6 G/DL (ref 11.7–15.7)
MCH RBC QN AUTO: 20.3 PG (ref 26.5–33)
MCHC RBC AUTO-ENTMCNC: 29.2 G/DL (ref 31.5–36.5)
MCV RBC AUTO: 70 FL (ref 78–100)
PLATELET # BLD AUTO: 258 10E9/L (ref 150–450)
RBC # BLD AUTO: 4.72 10E12/L (ref 3.8–5.2)
SPECIMEN EXP DATE BLD: NORMAL
TSH SERPL DL<=0.005 MIU/L-ACNC: 1.18 MU/L (ref 0.4–4)
WBC # BLD AUTO: 8.4 10E9/L (ref 4–11)

## 2021-03-12 PROCEDURE — 76817 TRANSVAGINAL US OBSTETRIC: CPT | Performed by: OBSTETRICS & GYNECOLOGY

## 2021-03-12 PROCEDURE — 86762 RUBELLA ANTIBODY: CPT | Performed by: OBSTETRICS & GYNECOLOGY

## 2021-03-12 PROCEDURE — 99212 OFFICE O/P EST SF 10 MIN: CPT | Mod: 25 | Performed by: OBSTETRICS & GYNECOLOGY

## 2021-03-12 PROCEDURE — 84443 ASSAY THYROID STIM HORMONE: CPT | Performed by: OBSTETRICS & GYNECOLOGY

## 2021-03-12 PROCEDURE — 90686 IIV4 VACC NO PRSV 0.5 ML IM: CPT | Performed by: OBSTETRICS & GYNECOLOGY

## 2021-03-12 PROCEDURE — 87389 HIV-1 AG W/HIV-1&-2 AB AG IA: CPT | Performed by: OBSTETRICS & GYNECOLOGY

## 2021-03-12 PROCEDURE — 86901 BLOOD TYPING SEROLOGIC RH(D): CPT | Performed by: OBSTETRICS & GYNECOLOGY

## 2021-03-12 PROCEDURE — 90471 IMMUNIZATION ADMIN: CPT | Performed by: OBSTETRICS & GYNECOLOGY

## 2021-03-12 PROCEDURE — 86850 RBC ANTIBODY SCREEN: CPT | Performed by: OBSTETRICS & GYNECOLOGY

## 2021-03-12 PROCEDURE — 85027 COMPLETE CBC AUTOMATED: CPT | Performed by: OBSTETRICS & GYNECOLOGY

## 2021-03-12 PROCEDURE — 36415 COLL VENOUS BLD VENIPUNCTURE: CPT | Performed by: OBSTETRICS & GYNECOLOGY

## 2021-03-12 PROCEDURE — 87340 HEPATITIS B SURFACE AG IA: CPT | Performed by: OBSTETRICS & GYNECOLOGY

## 2021-03-12 PROCEDURE — 86900 BLOOD TYPING SEROLOGIC ABO: CPT | Performed by: OBSTETRICS & GYNECOLOGY

## 2021-03-12 ASSESSMENT — MIFFLIN-ST. JEOR: SCORE: 1529.18

## 2021-03-12 NOTE — PROGRESS NOTES
[facility]   OB/GYN Clinic    CC: New OB     Subjective:    Denise is a 31 year old  at 8w2d who presents for her initial OB visit. She reports feeling well. Denies any uterine cramping, abdominal pain or vaginal bleeding. Did have some light spotting, resolved. Denies nausea and vomiting.       OB History    Para Term  AB Living   4 2 2 0 1 2   SAB TAB Ectopic Multiple Live Births   0 0 0 0 2      # Outcome Date GA Lbr Dk/2nd Weight Sex Delivery Anes PTL Lv   4 Current            3 Term 13 40w0d  3.459 kg (7 lb 10 oz) F    DAVID      Name: Shania   2 Term 11 37w0d  2.948 kg (6 lb 8 oz) F    DAVID      Name: Sri   1 Molar 2009              Birth Comments: blighted ovum       Past Medical History:   Diagnosis Date     Anemia      Chickenpox      Endometrial polyp      Hypothyroidism        Past Surgical History:   Procedure Laterality Date     AS HYSTEROSCOPY W ENDOMETRIAL BX/POLYPECTOMY W/WO D&C       LAPAROSCOPIC CHOLECYSTECTOMY       TONSILLECTOMY         Current Outpatient Medications   Medication Sig Dispense Refill     levothyroxine (SYNTHROID/LEVOTHROID) 125 MCG tablet Take 1 tablet (125 mcg) by mouth daily 90 tablet 1     Prenatal Vit-Fe Fumarate-FA (PRENATAL MULTIVITAMIN W/IRON) 27-0.8 MG tablet Take 1 tablet by mouth daily 90 tablet 3     ferrous gluconate (FERGON) 324 (38 Fe) MG tablet Take 1 tablet (324 mg) by mouth daily (with breakfast) (Patient not taking: Reported on 2021) 30 tablet 0       Family History   Problem Relation Age of Onset     Diabetes Mother      Hypertension Mother      No Known Problems Father      Other - See Comments Maternal Grandfather         MVA       Social History     Tobacco Use     Smoking status: Never Smoker     Smokeless tobacco: Never Used   Substance Use Topics     Alcohol use: Not Currently     Comment: occasionally-quit with pregnnacy       ROS: A 10 pt ROS was completed and found to be negative unless  mentioned in the HPI.     Objective:  /72 (BP Location: Right arm, Patient Position: Sitting, Cuff Size: Adult Large)   Pulse 73   Temp 98.4  F (36.9  C) (Tympanic)   Resp 14   Ht 1.524 m (5')   Wt 89.3 kg (196 lb 12.8 oz)   LMP 2020 (LMP Unknown)   BMI 38.43 kg/m      Estimated body mass index is 38.43 kg/m  as calculated from the following:    Height as of this encounter: 1.524 m (5').    Weight as of this encounter: 89.3 kg (196 lb 12.8 oz).    Physical Exam:  Gen: Pleasant, talkative female in no apparent distress   Endocrine: Thyroid without enlargement or nodularity   Lymph: no appreciable cervical lymphadenopathy  Respiratory: Lungs clear, breathing comfortably on room air   Cardiac: Regular rate and rhythm with no murmurs, gallops or rubs. Warm and well-perfused.   GI: Abd soft and non-tender  : External genitalia is free of lesion. Urethra and bartholin glands normal.  Vaginal mucosa is moist and pink without unusual discharge.  MSK: Grossly normal movement of all four extremities  Psych: mood and affect bright   Lower extremity: edema not present     Transvaginal US:  Single IUP measuring 8w5d c/w previous US, no adnexal masses. FHR 170s.    Assessment/Plan:   31 year old  at 8w2d who presents for her initial OB visit.   1) Plan to draw new OB lab today (T&S, CBC, HIV, RPR, HepBsAg, Hep B antibody, rubella, GC/Chlam, UC)  2) Discussed routine prenatal care, group practice model at [facility], tertiary support and frequency of visits. Options for  testing for chromosomal and birth defects were discussed with the patient including nuchal translucency/blood marker testing in the first trimester, progenity and quad screening and/or Level 2 ultrasound in the second trimester. We discussed that these are screening tests and not diagnostic tests and that false positives and negatives are a distinct possibility. We discussed that follow up diagnostic testing would include  chorionic villus sampling or amniocentesis depending on gestational age. Written information provided. Patient unsure  for genetic testing. Discussed risk of zika infection with travel and subsequent pregnancy risks. Referred to CDC for further information.   3) Plan for anatomy scan at 18w  4) Medication review: no changes, continue prenatal vitamin   5) Reviewed ectopic and miscarriage precautions (present to ED or call clinic with abdominal pain, vaginal bleeding or fever)   6) Immunizations: flu shot given, Tdap at 28wks    Return to clinic in 4 weeks.     Chichi Peoples MD   3/12/2021 3:59 PM

## 2021-03-12 NOTE — NURSING NOTE
Initial /72 (BP Location: Right arm, Patient Position: Sitting, Cuff Size: Adult Large)   Pulse 73   Temp 98.4  F (36.9  C) (Tympanic)   Resp 14   Ht 1.524 m (5')   Wt 89.3 kg (196 lb 12.8 oz)   LMP 12/03/2020 (LMP Unknown)   BMI 38.43 kg/m   Estimated body mass index is 38.43 kg/m  as calculated from the following:    Height as of this encounter: 1.524 m (5').    Weight as of this encounter: 89.3 kg (196 lb 12.8 oz). .

## 2021-03-13 LAB — T PALLIDUM AB SER QL: NONREACTIVE

## 2021-03-14 LAB
HBV SURFACE AG SERPL QL IA: NONREACTIVE
HIV 1+2 AB+HIV1 P24 AG SERPL QL IA: NONREACTIVE

## 2021-03-15 LAB — RUBV IGG SERPL IA-ACNC: 28 IU/ML

## 2021-04-16 ENCOUNTER — PRENATAL OFFICE VISIT (OUTPATIENT)
Dept: OBGYN | Facility: CLINIC | Age: 31
End: 2021-04-16
Payer: MEDICAID

## 2021-04-16 VITALS
BODY MASS INDEX: 37.11 KG/M2 | TEMPERATURE: 97.9 F | WEIGHT: 190 LBS | HEART RATE: 79 BPM | SYSTOLIC BLOOD PRESSURE: 114 MMHG | DIASTOLIC BLOOD PRESSURE: 61 MMHG

## 2021-04-16 DIAGNOSIS — O99.011 ANEMIA DURING PREGNANCY IN FIRST TRIMESTER: Primary | ICD-10-CM

## 2021-04-16 LAB
FERRITIN SERPL-MCNC: 12 NG/ML (ref 12–150)
IRON SATN MFR SERPL: 19 % (ref 15–46)
IRON SERPL-MCNC: 99 UG/DL (ref 35–180)
TIBC SERPL-MCNC: 522 UG/DL (ref 240–430)

## 2021-04-16 PROCEDURE — 99000 SPECIMEN HANDLING OFFICE-LAB: CPT | Performed by: OBSTETRICS & GYNECOLOGY

## 2021-04-16 PROCEDURE — 99212 OFFICE O/P EST SF 10 MIN: CPT | Performed by: OBSTETRICS & GYNECOLOGY

## 2021-04-16 PROCEDURE — 83021 HEMOGLOBIN CHROMOTOGRAPHY: CPT | Mod: 90 | Performed by: OBSTETRICS & GYNECOLOGY

## 2021-04-16 PROCEDURE — 82728 ASSAY OF FERRITIN: CPT | Performed by: OBSTETRICS & GYNECOLOGY

## 2021-04-16 PROCEDURE — 36415 COLL VENOUS BLD VENIPUNCTURE: CPT | Performed by: OBSTETRICS & GYNECOLOGY

## 2021-04-16 PROCEDURE — 83540 ASSAY OF IRON: CPT | Performed by: OBSTETRICS & GYNECOLOGY

## 2021-04-16 PROCEDURE — 83550 IRON BINDING TEST: CPT | Performed by: OBSTETRICS & GYNECOLOGY

## 2021-04-16 NOTE — PROGRESS NOTES
[facility]   OB/GYN Clinic    CC: Return OB     Subjective:    Denise is a 31 year old  at 13w2d who presents for return OB visit. She reports feeling well. Denies uterine cramping, vaginal bleeding or leaking, dysuria. +fetal movement.     Objective:  LMP 2020 (LMP Unknown)     Estimated body mass index is 38.43 kg/m  as calculated from the following:    Height as of 3/12/21: 1.524 m (5').    Weight as of 3/12/21: 89.3 kg (196 lb 12.8 oz).    Physical Exam:  Gen: Pleasant, talkative female in no apparent distress   Respiratory: breathing comfortably on room air   Cardiac: Warm and well-perfused.   GI: Abd soft and non-tender, gravid  MSK: Grossly normal movement of all four extremities  Psych: mood and affect bright   Lower extremity: edema not present     Fetal dop tones: 150s bpm    Assessment/Plan:   31 year old  at 13w2d who presents for follow-up OB visit.   1) New OB lab; T&S, CBC, HIV, RPR, HepBsAg, Hep B antibody, rubella, GC/Chlam. Plan for 3rd tri lab at 28wks.   2) Genetics testing: unsure  3) plan for anatomy scan at 18wks  4) PMH/OBHx problems:    - anemia- iron studies and HGB electrophoresis ordered today  5) Medication review: no changes, continue prenatal vitamin   6) Immunizations: flu shot in the fall, Tdap at 28wks  7) hypothyroid- TSH normal at new OB, repeat qtrimester  Return to clinic in 4 weeks.

## 2021-04-16 NOTE — NURSING NOTE
Initial /61 (BP Location: Right arm, Patient Position: Chair, Cuff Size: Adult Large)   Pulse 79   Temp 97.9  F (36.6  C) (Tympanic)   Wt 86.2 kg (190 lb)   LMP 12/03/2020 (LMP Unknown)   Breastfeeding No   BMI 37.11 kg/m   Estimated body mass index is 37.11 kg/m  as calculated from the following:    Height as of 3/12/21: 1.524 m (5').    Weight as of this encounter: 86.2 kg (190 lb). .    Nyasia Sheriff MA

## 2021-04-21 LAB
HGB A1 MFR BLD: 97.3 % (ref 95–97.9)
HGB A2 MFR BLD: 2.5 % (ref 2–3.5)
HGB C MFR BLD: 0 % (ref 0–0)
HGB E MFR BLD: 0 % (ref 0–0)
HGB F MFR BLD: 0.2 % (ref 0–2.1)
HGB FRACT BLD ELPH-IMP: NORMAL
HGB OTHER MFR BLD: 0 % (ref 0–0)
HGB S BLD QL SOLY: NORMAL
HGB S MFR BLD: 0 % (ref 0–0)
PATH INTERP BLD-IMP: NORMAL

## 2021-05-21 ENCOUNTER — PRENATAL OFFICE VISIT (OUTPATIENT)
Dept: OBGYN | Facility: CLINIC | Age: 31
End: 2021-05-21
Payer: COMMERCIAL

## 2021-05-21 VITALS
RESPIRATION RATE: 16 BRPM | HEART RATE: 75 BPM | HEIGHT: 60 IN | SYSTOLIC BLOOD PRESSURE: 111 MMHG | TEMPERATURE: 97.9 F | WEIGHT: 190.9 LBS | DIASTOLIC BLOOD PRESSURE: 64 MMHG | BODY MASS INDEX: 37.48 KG/M2

## 2021-05-21 DIAGNOSIS — Z34.82 PRENATAL CARE, SUBSEQUENT PREGNANCY IN SECOND TRIMESTER: Primary | ICD-10-CM

## 2021-05-21 PROCEDURE — 99207 PR PRENATAL VISIT: CPT | Performed by: OBSTETRICS & GYNECOLOGY

## 2021-05-21 ASSESSMENT — MIFFLIN-ST. JEOR: SCORE: 1502.42

## 2021-05-21 NOTE — PROGRESS NOTES
[facility]   OB/GYN Clinic     CC: Return OB      Subjective:     Denise is a 31 year old  at 18w2d  who presents for return OB visit. She reports feeling well. Denies uterine cramping, vaginal bleeding or leaking, dysuria. +fetal movement.   Has had some more breast pain, no fevers/redness, possible small white spot on nipple.      Objective:  /64 (BP Location: Right arm, Cuff Size: Adult Regular)   Pulse 75   Temp 97.9  F (36.6  C)   Resp 16   Ht 1.524 m (5')   Wt 86.6 kg (190 lb 14.4 oz)   LMP 2020 (LMP Unknown)   BMI 37.28 kg/m     Physical Exam:  Gen: Pleasant, talkative female in no apparent distress   Breast: R breast with normal exam, no evidence of abscess, no abnormal findings seen  Respiratory: breathing comfortably on room air   Cardiac: Warm and well-perfused.   GI: Abd soft and non-tender, gravid  MSK: Grossly normal movement of all four extremities  Psych: mood and affect bright   Lower extremity: edema not present      Fetal dop tones: 150s bpm     Assessment/Plan:   31 year old  at 18w2d who presents for follow-up OB visit.   1) New OB lab; T&S, CBC, HIV, RPR, HepBsAg, Hep B antibody, rubella, GC/Chlam. Plan for 3rd tri lab at 28wks.   2) Genetics testing: unsure  3) anatomy scan at 20w  4) PMH/OBHx problems:               - anemia- iron studies and HGB electrophoresis WNL  5) Medication review: no changes, continue prenatal vitamin   6) Immunizations: flu shot in the fall, Tdap at 28wks  7) hypothyroid- TSH normal at new OB, repeat qtrimester  8) breast pain bilaterally- reassurance provided    Return to clinic in 4 weeks.     Chichi Peoples MD   2021 3:12 PM

## 2021-06-04 ENCOUNTER — ANCILLARY PROCEDURE (OUTPATIENT)
Dept: ULTRASOUND IMAGING | Facility: CLINIC | Age: 31
End: 2021-06-04
Attending: OBSTETRICS & GYNECOLOGY
Payer: COMMERCIAL

## 2021-06-04 DIAGNOSIS — Z34.82 PRENATAL CARE, SUBSEQUENT PREGNANCY IN SECOND TRIMESTER: ICD-10-CM

## 2021-06-04 PROCEDURE — 76805 OB US >/= 14 WKS SNGL FETUS: CPT | Performed by: RADIOLOGY

## 2021-06-18 ENCOUNTER — PRENATAL OFFICE VISIT (OUTPATIENT)
Dept: OBGYN | Facility: CLINIC | Age: 31
End: 2021-06-18
Payer: COMMERCIAL

## 2021-06-18 VITALS
HEART RATE: 73 BPM | BODY MASS INDEX: 38.44 KG/M2 | TEMPERATURE: 97.3 F | WEIGHT: 195.8 LBS | RESPIRATION RATE: 12 BRPM | DIASTOLIC BLOOD PRESSURE: 62 MMHG | HEIGHT: 60 IN | SYSTOLIC BLOOD PRESSURE: 111 MMHG

## 2021-06-18 DIAGNOSIS — E03.9 HYPOTHYROIDISM, UNSPECIFIED TYPE: ICD-10-CM

## 2021-06-18 DIAGNOSIS — Z34.82 PRENATAL CARE, SUBSEQUENT PREGNANCY IN SECOND TRIMESTER: Primary | ICD-10-CM

## 2021-06-18 DIAGNOSIS — M25.461 EFFUSION OF RIGHT KNEE: ICD-10-CM

## 2021-06-18 DIAGNOSIS — D50.0 IRON DEFICIENCY ANEMIA DUE TO CHRONIC BLOOD LOSS: ICD-10-CM

## 2021-06-18 LAB
ERYTHROCYTE [DISTWIDTH] IN BLOOD BY AUTOMATED COUNT: 18.3 % (ref 10–15)
HCT VFR BLD AUTO: 36.5 % (ref 35–47)
HGB BLD-MCNC: 11.7 G/DL (ref 11.7–15.7)
MCH RBC QN AUTO: 27.3 PG (ref 26.5–33)
MCHC RBC AUTO-ENTMCNC: 32.1 G/DL (ref 31.5–36.5)
MCV RBC AUTO: 85 FL (ref 78–100)
PLATELET # BLD AUTO: 167 10E9/L (ref 150–450)
RBC # BLD AUTO: 4.28 10E12/L (ref 3.8–5.2)
TSH SERPL DL<=0.005 MIU/L-ACNC: 1.24 MU/L (ref 0.4–4)
WBC # BLD AUTO: 7.8 10E9/L (ref 4–11)

## 2021-06-18 PROCEDURE — 36415 COLL VENOUS BLD VENIPUNCTURE: CPT | Performed by: OBSTETRICS & GYNECOLOGY

## 2021-06-18 PROCEDURE — 84443 ASSAY THYROID STIM HORMONE: CPT | Performed by: OBSTETRICS & GYNECOLOGY

## 2021-06-18 PROCEDURE — 99207 PR PRENATAL VISIT: CPT | Performed by: OBSTETRICS & GYNECOLOGY

## 2021-06-18 PROCEDURE — 85027 COMPLETE CBC AUTOMATED: CPT | Performed by: OBSTETRICS & GYNECOLOGY

## 2021-06-18 RX ORDER — FERROUS GLUCONATE 324(38)MG
324 TABLET ORAL
Qty: 30 TABLET | Refills: 0 | Status: SHIPPED | OUTPATIENT
Start: 2021-06-18 | End: 2021-08-04

## 2021-06-18 ASSESSMENT — MIFFLIN-ST. JEOR: SCORE: 1524.64

## 2021-06-18 NOTE — PROGRESS NOTES
[facility]    Return OB Note    CC: Return OB     Subjective:  Denise is a 31 year old  at 22w2d   Denies vaginal bleeding, loss of fluid, or regular contractions. Good fetal movement.  Complaints today: Getting dry hand with rash, improved with steroid cream.    Objective:  /62 (BP Location: Right arm, Patient Position: Sitting, Cuff Size: Adult Large)   Pulse 73   Temp 97.3  F (36.3  C) (Tympanic)   Resp 12   Ht 1.524 m (5')   Wt 88.8 kg (195 lb 12.8 oz)   LMP 2020 (LMP Unknown)   BMI 38.24 kg/m      Fundal height: 22cm  FHT: 150bpm    Assessment/Plan:   Encounter Diagnoses   Name Primary?     Prenatal care, subsequent pregnancy in second trimester Yes     Hypothyroidism, unspecified type        IUP at 22w2d  -Hypothyroid: taking Synthroid, recheck TSH today  -Anemia: Hb 9.6g/dL at NOB. Iron (with supplementation) and electrophoresis normal, has been taking PO iron, recheck CBC today.  -Anatomy US reviewed and normal  -Strict return precautions given    RTC 4 weeks    Fozia Montgomery DO

## 2021-07-19 ENCOUNTER — PRENATAL OFFICE VISIT (OUTPATIENT)
Dept: OBGYN | Facility: CLINIC | Age: 31
End: 2021-07-19
Payer: COMMERCIAL

## 2021-07-19 VITALS
TEMPERATURE: 97.9 F | BODY MASS INDEX: 39.19 KG/M2 | DIASTOLIC BLOOD PRESSURE: 68 MMHG | HEART RATE: 80 BPM | HEIGHT: 60 IN | RESPIRATION RATE: 16 BRPM | WEIGHT: 199.6 LBS | SYSTOLIC BLOOD PRESSURE: 121 MMHG

## 2021-07-19 DIAGNOSIS — E03.9 HYPOTHYROIDISM, UNSPECIFIED TYPE: Primary | ICD-10-CM

## 2021-07-19 DIAGNOSIS — Z34.82 PRENATAL CARE, SUBSEQUENT PREGNANCY IN SECOND TRIMESTER: ICD-10-CM

## 2021-07-19 LAB
ALBUMIN UR-MCNC: NEGATIVE MG/DL
APPEARANCE UR: CLEAR
BACTERIA #/AREA URNS HPF: ABNORMAL /HPF
BILIRUB UR QL STRIP: NEGATIVE
COLOR UR AUTO: YELLOW
ERYTHROCYTE [DISTWIDTH] IN BLOOD BY AUTOMATED COUNT: 15.6 % (ref 10–15)
GLUCOSE 1H P 50 G GLC PO SERPL-MCNC: 103 MG/DL (ref 70–129)
GLUCOSE UR STRIP-MCNC: NEGATIVE MG/DL
HCT VFR BLD AUTO: 37.1 % (ref 35–47)
HGB BLD-MCNC: 12.1 G/DL (ref 11.7–15.7)
HGB UR QL STRIP: NEGATIVE
KETONES UR STRIP-MCNC: NEGATIVE MG/DL
LEUKOCYTE ESTERASE UR QL STRIP: NEGATIVE
MCH RBC QN AUTO: 29.2 PG (ref 26.5–33)
MCHC RBC AUTO-ENTMCNC: 32.6 G/DL (ref 31.5–36.5)
MCV RBC AUTO: 90 FL (ref 78–100)
NITRATE UR QL: NEGATIVE
PH UR STRIP: 6 [PH] (ref 5–7)
PLATELET # BLD AUTO: 165 10E3/UL (ref 150–450)
RBC # BLD AUTO: 4.14 10E6/UL (ref 3.8–5.2)
RBC #/AREA URNS AUTO: ABNORMAL /HPF
SP GR UR STRIP: 1.02 (ref 1–1.03)
SQUAMOUS #/AREA URNS AUTO: ABNORMAL /LPF
UROBILINOGEN UR STRIP-ACNC: 0.2 E.U./DL
WBC # BLD AUTO: 8.2 10E3/UL (ref 4–11)
WBC #/AREA URNS AUTO: ABNORMAL /HPF

## 2021-07-19 PROCEDURE — 99207 PR PRENATAL VISIT: CPT | Performed by: OBSTETRICS & GYNECOLOGY

## 2021-07-19 PROCEDURE — 81001 URINALYSIS AUTO W/SCOPE: CPT | Performed by: OBSTETRICS & GYNECOLOGY

## 2021-07-19 PROCEDURE — 85027 COMPLETE CBC AUTOMATED: CPT | Performed by: OBSTETRICS & GYNECOLOGY

## 2021-07-19 PROCEDURE — 87086 URINE CULTURE/COLONY COUNT: CPT | Performed by: OBSTETRICS & GYNECOLOGY

## 2021-07-19 PROCEDURE — 86780 TREPONEMA PALLIDUM: CPT | Performed by: OBSTETRICS & GYNECOLOGY

## 2021-07-19 PROCEDURE — 82952 GTT-ADDED SAMPLES: CPT | Performed by: OBSTETRICS & GYNECOLOGY

## 2021-07-19 PROCEDURE — 36415 COLL VENOUS BLD VENIPUNCTURE: CPT | Performed by: OBSTETRICS & GYNECOLOGY

## 2021-07-19 ASSESSMENT — MIFFLIN-ST. JEOR: SCORE: 1541.88

## 2021-07-19 NOTE — NURSING NOTE
Initial /68 (BP Location: Right arm, Patient Position: Chair, Cuff Size: Adult Regular)   Pulse 80   Temp 97.9  F (36.6  C) (Tympanic)   Resp 16   Ht 1.524 m (5')   Wt 90.5 kg (199 lb 9.6 oz)   LMP 12/03/2020 (LMP Unknown)   Breastfeeding No   BMI 38.98 kg/m   Estimated body mass index is 38.98 kg/m  as calculated from the following:    Height as of this encounter: 1.524 m (5').    Weight as of this encounter: 90.5 kg (199 lb 9.6 oz). .    Dinora Morrison, CMA

## 2021-07-19 NOTE — PROGRESS NOTES
[facility]    Return OB Note    CC: Return OB     Subjective:  Denise is a 31 year old  at 26w5d   Denies vaginal bleeding, loss of fluid, or regular contractions. Good fetal movement.  Complaints today: Dark urine, no dysuria. Thinks she could just be dehydrated or holding her urine too long, concerned for infection.    Objective:  /68 (BP Location: Right arm, Patient Position: Chair, Cuff Size: Adult Regular)   Pulse 80   Temp 97.9  F (36.6  C) (Tympanic)   Resp 16   Ht 1.524 m (5')   Wt 90.5 kg (199 lb 9.6 oz)   LMP 2020 (LMP Unknown)   Breastfeeding No   BMI 38.98 kg/m      Fundal height: 28cm  FHT: 150bpm    Assessment/Plan:   Encounter Diagnoses   Name Primary?     Prenatal care, subsequent pregnancy in second trimester      Hypothyroidism, unspecified type Yes       IUP at 26w5d  -UA and Urine culture for concern with dark urine  -Mid trimester labs today  -Hypothyroid: TSH WNL , continue current dose Synthroid  -Anemia: Hb 9.6--> 11.7 with PO iron. Continue iron supplementation  -Watch fundal height and consider growth US  -Strict return precautions given    RTC 2 weeks    Fozia Montgomery,

## 2021-07-20 LAB
BACTERIA UR CULT: NO GROWTH
T PALLIDUM AB SER QL: NONREACTIVE

## 2021-08-04 ENCOUNTER — PRENATAL OFFICE VISIT (OUTPATIENT)
Dept: OBGYN | Facility: CLINIC | Age: 31
End: 2021-08-04
Payer: COMMERCIAL

## 2021-08-04 VITALS
TEMPERATURE: 95.6 F | WEIGHT: 200.2 LBS | BODY MASS INDEX: 39.3 KG/M2 | HEART RATE: 83 BPM | DIASTOLIC BLOOD PRESSURE: 62 MMHG | SYSTOLIC BLOOD PRESSURE: 113 MMHG | RESPIRATION RATE: 10 BRPM | HEIGHT: 60 IN

## 2021-08-04 DIAGNOSIS — E03.9 HYPOTHYROIDISM, UNSPECIFIED TYPE: Primary | ICD-10-CM

## 2021-08-04 DIAGNOSIS — D50.0 IRON DEFICIENCY ANEMIA DUE TO CHRONIC BLOOD LOSS: ICD-10-CM

## 2021-08-04 DIAGNOSIS — Z34.83 PRENATAL CARE, SUBSEQUENT PREGNANCY IN THIRD TRIMESTER: ICD-10-CM

## 2021-08-04 DIAGNOSIS — M25.461 EFFUSION OF RIGHT KNEE: ICD-10-CM

## 2021-08-04 LAB
T4 FREE SERPL-MCNC: 0.99 NG/DL (ref 0.76–1.46)
TSH SERPL DL<=0.005 MIU/L-ACNC: 0.18 MU/L (ref 0.4–4)

## 2021-08-04 PROCEDURE — 36415 COLL VENOUS BLD VENIPUNCTURE: CPT | Performed by: OBSTETRICS & GYNECOLOGY

## 2021-08-04 PROCEDURE — 90715 TDAP VACCINE 7 YRS/> IM: CPT | Performed by: OBSTETRICS & GYNECOLOGY

## 2021-08-04 PROCEDURE — 84443 ASSAY THYROID STIM HORMONE: CPT | Performed by: OBSTETRICS & GYNECOLOGY

## 2021-08-04 PROCEDURE — 90471 IMMUNIZATION ADMIN: CPT | Performed by: OBSTETRICS & GYNECOLOGY

## 2021-08-04 PROCEDURE — 84439 ASSAY OF FREE THYROXINE: CPT | Performed by: OBSTETRICS & GYNECOLOGY

## 2021-08-04 PROCEDURE — 99207 PR PRENATAL VISIT: CPT | Performed by: OBSTETRICS & GYNECOLOGY

## 2021-08-04 RX ORDER — FERROUS GLUCONATE 324(38)MG
324 TABLET ORAL
Qty: 60 TABLET | Refills: 3 | Status: SHIPPED | OUTPATIENT
Start: 2021-08-04 | End: 2022-05-03

## 2021-08-04 RX ORDER — BREAST PUMP
1 EACH MISCELLANEOUS DAILY
Qty: 1 EACH | Refills: 0 | Status: SHIPPED | OUTPATIENT
Start: 2021-08-04 | End: 2021-12-14

## 2021-08-04 ASSESSMENT — MIFFLIN-ST. JEOR: SCORE: 1544.6

## 2021-08-04 NOTE — NURSING NOTE
Prior to immunization administration, verified patients identity using patient s name and date of birth. Please see Immunization Activity for additional information.     Screening Questionnaire for Adult Immunization    Are you sick today?   No   Do you have allergies to medications, food, a vaccine component or latex?   No   Have you ever had a serious reaction after receiving a vaccination?   No   Do you have a long-term health problem with heart, lung, kidney, or metabolic disease (e.g., diabetes), asthma, a blood disorder, no spleen, complement component deficiency, a cochlear implant, or a spinal fluid leak?  Are you on long-term aspirin therapy?   No   Do you have cancer, leukemia, HIV/AIDS, or any other immune system problem?   No   Do you have a parent, brother, or sister with an immune system problem?   No   In the past 3 months, have you taken medications that affect  your immune system, such as prednisone, other steroids, or anticancer drugs; drugs for the treatment of rheumatoid arthritis, Crohn s disease, or psoriasis; or have you had radiation treatments?   No   Have you had a seizure, or a brain or other nervous system problem?   No   During the past year, have you received a transfusion of blood or blood    products, or been given immune (gamma) globulin or antiviral drug?   No   For women: Are you pregnant or is there a chance you could become       pregnant during the next month?   Pregnant    Have you received any vaccinations in the past 4 weeks?   No            Per orders of Dr. Montgomery, injection of TDAP given by Agapito Frazier MA. Patient instructed to remain in clinic for 15 minutes afterwards, and to report any adverse reaction to me immediately.       Screening performed by Agapito Frazier MA on 8/4/2021 at 2:20 PM.

## 2021-08-04 NOTE — PROGRESS NOTES
[facility] OB/[facility]N Clinic    Return OB Note    CC: Return OB     Subjective:  Denise is a 31 year old  at 29w0d   Denies vaginal bleeding, loss of fluid, or regular contractions. Good fetal movement.  Complaints today: Getting more back pain    Objective:  /62 (BP Location: Right arm, Patient Position: Sitting, Cuff Size: Adult Large)   Pulse 83   Temp (!) 95.6  F (35.3  C) (Tympanic)   Resp 10   Ht 1.524 m (5')   Wt 90.8 kg (200 lb 3.2 oz)   LMP 2020 (LMP Unknown)   BMI 39.10 kg/m      Fundal height: 30cm  FHT: 145bpm    Assessment/Plan:   Encounter Diagnoses   Name Primary?     Hypothyroidism, unspecified type Yes     Prenatal care, subsequent pregnancy in third trimester      Lactating mother        IUP at 29w0d  -Hypothyroid: TSH WNL 18, continue current dose Synthroid. Recheck TSH today.  -Anemia: Hb 9.6--> 11.7 with PO iron. Continue iron supplementation  -Rx for breast pump  -Given abdominal binder  -TDap today  -Strict return precautions given    RTC 2 weeks    Fozia Montgomery DO

## 2021-08-04 NOTE — NURSING NOTE
Initial /62 (BP Location: Right arm, Patient Position: Sitting, Cuff Size: Adult Large)   Pulse 83   Temp (!) 95.6  F (35.3  C) (Tympanic)   Resp 10   Ht 1.524 m (5')   Wt 90.8 kg (200 lb 3.2 oz)   LMP 12/03/2020 (LMP Unknown)   BMI 39.10 kg/m   Estimated body mass index is 39.1 kg/m  as calculated from the following:    Height as of this encounter: 1.524 m (5').    Weight as of this encounter: 90.8 kg (200 lb 3.2 oz). .

## 2021-08-17 ENCOUNTER — PRENATAL OFFICE VISIT (OUTPATIENT)
Dept: OBGYN | Facility: CLINIC | Age: 31
End: 2021-08-17
Payer: COMMERCIAL

## 2021-08-17 VITALS
DIASTOLIC BLOOD PRESSURE: 66 MMHG | BODY MASS INDEX: 39.54 KG/M2 | WEIGHT: 201.4 LBS | SYSTOLIC BLOOD PRESSURE: 113 MMHG | RESPIRATION RATE: 12 BRPM | HEART RATE: 84 BPM | HEIGHT: 60 IN

## 2021-08-17 DIAGNOSIS — E03.9 HYPOTHYROIDISM, UNSPECIFIED TYPE: ICD-10-CM

## 2021-08-17 DIAGNOSIS — Z34.83 PRENATAL CARE, SUBSEQUENT PREGNANCY IN THIRD TRIMESTER: Primary | ICD-10-CM

## 2021-08-17 PROCEDURE — 99207 PR PRENATAL VISIT: CPT | Performed by: OBSTETRICS & GYNECOLOGY

## 2021-08-17 ASSESSMENT — MIFFLIN-ST. JEOR: SCORE: 1550.04

## 2021-08-17 NOTE — NURSING NOTE
Initial /66 (BP Location: Right arm, Patient Position: Sitting, Cuff Size: Adult Large)   Pulse 84   Resp 12   Ht 1.524 m (5')   Wt 91.4 kg (201 lb 6.4 oz)   LMP 12/03/2020 (LMP Unknown)   BMI 39.33 kg/m   Estimated body mass index is 39.33 kg/m  as calculated from the following:    Height as of this encounter: 1.524 m (5').    Weight as of this encounter: 91.4 kg (201 lb 6.4 oz). .

## 2021-08-17 NOTE — PROGRESS NOTES
[facility]    Return OB Note    CC: Return OB     Subjective:  Denise is a 31 year old  at 30w6d   Denies vaginal bleeding, loss of fluid, or regular contractions. Good fetal movement.  Complaints today: Some more intermittent back pain, better with abdominal binder.     Objective:  /66 (BP Location: Right arm, Patient Position: Sitting, Cuff Size: Adult Large)   Pulse 84   Resp 12   Ht 1.524 m (5')   Wt 91.4 kg (201 lb 6.4 oz)   LMP 2020 (LMP Unknown)   BMI 39.33 kg/m      Fundal height: 32cm  FHT: 140bpm    Assessment/Plan:   Encounter Diagnoses   Name Primary?     Prenatal care, subsequent pregnancy in third trimester Yes     Hypothyroidism, unspecified type        IUP at 30w6d  -Hypothyroid: TSH slightly low but T4 normal, will keep Synthroid the same for now, unlikely to have decreasing thyroid requirements with advancing gestation  -Anemia: Hb 9.6--> 11.7 with PO iron. Continue iron supplementation  -S/p TDap  -Strict return precautions given    RTC 2 weeks    Fozia Montgomery DO

## 2021-09-03 ENCOUNTER — PRENATAL OFFICE VISIT (OUTPATIENT)
Dept: OBGYN | Facility: CLINIC | Age: 31
End: 2021-09-03
Payer: COMMERCIAL

## 2021-09-03 VITALS
HEART RATE: 82 BPM | WEIGHT: 203.2 LBS | HEIGHT: 60 IN | BODY MASS INDEX: 39.89 KG/M2 | DIASTOLIC BLOOD PRESSURE: 69 MMHG | SYSTOLIC BLOOD PRESSURE: 120 MMHG | TEMPERATURE: 96.1 F | RESPIRATION RATE: 18 BRPM

## 2021-09-03 DIAGNOSIS — E03.9 HYPOTHYROIDISM, UNSPECIFIED TYPE: ICD-10-CM

## 2021-09-03 PROCEDURE — 99207 PR PRENATAL VISIT: CPT | Performed by: OBSTETRICS & GYNECOLOGY

## 2021-09-03 RX ORDER — LEVOTHYROXINE SODIUM 125 UG/1
125 TABLET ORAL DAILY
Qty: 90 TABLET | Refills: 1 | Status: SHIPPED | OUTPATIENT
Start: 2021-09-03 | End: 2021-12-14

## 2021-09-03 ASSESSMENT — MIFFLIN-ST. JEOR: SCORE: 1558.21

## 2021-09-03 NOTE — PROGRESS NOTES
CC: Here for routine prenatal visit @ 33w2d   HPI: + FM, no ctx, no LOF, no VB.  No complaints.     PE: /69 (BP Location: Right arm, Cuff Size: Adult Regular)   Pulse 82   Temp (!) 96.1  F (35.6  C)   Resp 18   Ht 1.524 m (5')   Wt 92.2 kg (203 lb 3.2 oz)   LMP 2020 (LMP Unknown)   BMI 39.68 kg/m     See OB flowsheet    A/P  @ 33w2d normal pregnancy    1. Routine prenatal care.  COVID restrictions and recommendations reviewed including iron supplementation and vaccination.     RTC 2 weeks.      Brigitte Storey M.D.

## 2021-09-14 ENCOUNTER — PRENATAL OFFICE VISIT (OUTPATIENT)
Dept: OBGYN | Facility: CLINIC | Age: 31
End: 2021-09-14
Payer: COMMERCIAL

## 2021-09-14 VITALS
HEART RATE: 78 BPM | RESPIRATION RATE: 10 BRPM | SYSTOLIC BLOOD PRESSURE: 102 MMHG | HEIGHT: 60 IN | BODY MASS INDEX: 40.52 KG/M2 | TEMPERATURE: 96.1 F | DIASTOLIC BLOOD PRESSURE: 67 MMHG | WEIGHT: 206.4 LBS

## 2021-09-14 DIAGNOSIS — Z23 NEED FOR PROPHYLACTIC VACCINATION AND INOCULATION AGAINST INFLUENZA: Primary | ICD-10-CM

## 2021-09-14 DIAGNOSIS — Z34.83 PRENATAL CARE, SUBSEQUENT PREGNANCY IN THIRD TRIMESTER: ICD-10-CM

## 2021-09-14 DIAGNOSIS — E03.9 HYPOTHYROIDISM, UNSPECIFIED TYPE: ICD-10-CM

## 2021-09-14 PROCEDURE — 99207 PR PRENATAL VISIT: CPT | Performed by: OBSTETRICS & GYNECOLOGY

## 2021-09-14 PROCEDURE — 90686 IIV4 VACC NO PRSV 0.5 ML IM: CPT | Performed by: OBSTETRICS & GYNECOLOGY

## 2021-09-14 PROCEDURE — 90471 IMMUNIZATION ADMIN: CPT | Performed by: OBSTETRICS & GYNECOLOGY

## 2021-09-14 ASSESSMENT — MIFFLIN-ST. JEOR: SCORE: 1572.72

## 2021-09-14 NOTE — PROGRESS NOTES
[facility]    Return OB Note    CC: Return OB     Subjective:  Denise is a 31 year old  at 34w6d   Denies vaginal bleeding, loss of fluid, or regular contractions. Good fetal movement.  Complaints today: Pubic bone pain when walking and getting in/out of cars. Using abdominal support band. No major separation on exam.     Objective:  /67 (BP Location: Right arm, Patient Position: Sitting, Cuff Size: Adult Large)   Pulse 78   Temp (!) 96.1  F (35.6  C) (Tympanic)   Resp 10   Ht 1.524 m (5')   Wt 93.6 kg (206 lb 6.4 oz)   LMP 2020 (LMP Unknown)   BMI 40.31 kg/m      Fundal height: 36cm  FHT: 140bpm    Assessment/Plan:   Encounter Diagnoses   Name Primary?     Need for prophylactic vaccination and inoculation against influenza Yes     Prenatal care, subsequent pregnancy in third trimester      Hypothyroidism, unspecified type        IUP at 34w6d  -Hypothyroid: continue Synthroid  -Anemia: Hb improved to 11.7, continue iron  -S/p TDap, flu shot today  -Strict return precautions given    RTC 1 weeks    Fozia Montgomery DO

## 2021-09-21 ENCOUNTER — PRENATAL OFFICE VISIT (OUTPATIENT)
Dept: OBGYN | Facility: CLINIC | Age: 31
End: 2021-09-21
Payer: COMMERCIAL

## 2021-09-21 VITALS
DIASTOLIC BLOOD PRESSURE: 73 MMHG | SYSTOLIC BLOOD PRESSURE: 112 MMHG | HEIGHT: 60 IN | HEART RATE: 89 BPM | WEIGHT: 206.5 LBS | RESPIRATION RATE: 12 BRPM | BODY MASS INDEX: 40.54 KG/M2 | TEMPERATURE: 97.5 F

## 2021-09-21 DIAGNOSIS — E03.9 HYPOTHYROIDISM, UNSPECIFIED TYPE: ICD-10-CM

## 2021-09-21 DIAGNOSIS — Z34.83 PRENATAL CARE, SUBSEQUENT PREGNANCY IN THIRD TRIMESTER: Primary | ICD-10-CM

## 2021-09-21 DIAGNOSIS — R04.0 BLEEDING NOSE: ICD-10-CM

## 2021-09-21 LAB
ERYTHROCYTE [DISTWIDTH] IN BLOOD BY AUTOMATED COUNT: 13.4 % (ref 10–15)
HCT VFR BLD AUTO: 37.6 % (ref 35–47)
HGB BLD-MCNC: 12.6 G/DL (ref 11.7–15.7)
MCH RBC QN AUTO: 30.4 PG (ref 26.5–33)
MCHC RBC AUTO-ENTMCNC: 33.5 G/DL (ref 31.5–36.5)
MCV RBC AUTO: 91 FL (ref 78–100)
PLATELET # BLD AUTO: 148 10E3/UL (ref 150–450)
RBC # BLD AUTO: 4.14 10E6/UL (ref 3.8–5.2)
TSH SERPL DL<=0.005 MIU/L-ACNC: 0.58 MU/L (ref 0.4–4)
WBC # BLD AUTO: 8 10E3/UL (ref 4–11)

## 2021-09-21 PROCEDURE — 36415 COLL VENOUS BLD VENIPUNCTURE: CPT | Performed by: OBSTETRICS & GYNECOLOGY

## 2021-09-21 PROCEDURE — 87653 STREP B DNA AMP PROBE: CPT | Performed by: OBSTETRICS & GYNECOLOGY

## 2021-09-21 PROCEDURE — 84443 ASSAY THYROID STIM HORMONE: CPT | Performed by: OBSTETRICS & GYNECOLOGY

## 2021-09-21 PROCEDURE — 85027 COMPLETE CBC AUTOMATED: CPT | Performed by: OBSTETRICS & GYNECOLOGY

## 2021-09-21 PROCEDURE — 99207 PR PRENATAL VISIT: CPT | Performed by: OBSTETRICS & GYNECOLOGY

## 2021-09-21 ASSESSMENT — MIFFLIN-ST. JEOR: SCORE: 1573.18

## 2021-09-21 NOTE — NURSING NOTE
Initial /73 (BP Location: Left arm, Patient Position: Sitting, Cuff Size: Adult Large)   Pulse 89   Temp 97.5  F (36.4  C) (Tympanic)   Resp 12   Ht 1.524 m (5')   Wt 93.7 kg (206 lb 8 oz)   LMP 12/03/2020 (LMP Unknown)   BMI 40.33 kg/m   Estimated body mass index is 40.33 kg/m  as calculated from the following:    Height as of this encounter: 1.524 m (5').    Weight as of this encounter: 93.7 kg (206 lb 8 oz). .

## 2021-09-21 NOTE — PROGRESS NOTES
[facility]    Return OB Note    CC: Return OB     Subjective:  Denise is a 31 year old  at 35w6d   Denies vaginal bleeding, loss of fluid, or regular contractions. Good fetal movement.  Complaints today: having frequent nose bleeds over the past few weeks, not daily. Have been stopping on their own with pressure.    Objective:  /73 (BP Location: Left arm, Patient Position: Sitting, Cuff Size: Adult Large)   Pulse 89   Temp 97.5  F (36.4  C) (Tympanic)   Resp 12   Ht 1.524 m (5')   Wt 93.7 kg (206 lb 8 oz)   LMP 2020 (LMP Unknown)   BMI 40.33 kg/m      Fundal height: 36cm  FHT: 135bpm  SVE: 2/30/-3    Assessment/Plan:   Encounter Diagnoses   Name Primary?     Prenatal care, subsequent pregnancy in third trimester Yes     Hypothyroidism, unspecified type      Bleeding nose        IUP at 35w6d  -Will check CBC for frequent nose bleeds  -Hypothyroid: continue Synthroid, check TSH today  -Anemia: Hb improved to 11.7, continue iron  -Obesity: pre-gravid BMI <40, no need for  testing  -S/p TDap and flu vaccines  -GBS today  -Strict return precautions given    RTC 1 weeks    Fozia Montgomery DO

## 2021-09-22 LAB
GP B STREP DNA SPEC QL NAA+PROBE: NEGATIVE
PATIENT PENICILLIN, AMOXICILLIN, CEPHALOSPORINS ALLERGY: NO

## 2021-09-28 ENCOUNTER — PRENATAL OFFICE VISIT (OUTPATIENT)
Dept: OBGYN | Facility: CLINIC | Age: 31
End: 2021-09-28
Payer: COMMERCIAL

## 2021-09-28 VITALS
RESPIRATION RATE: 12 BRPM | SYSTOLIC BLOOD PRESSURE: 126 MMHG | DIASTOLIC BLOOD PRESSURE: 86 MMHG | BODY MASS INDEX: 40.62 KG/M2 | TEMPERATURE: 97.3 F | WEIGHT: 206.9 LBS | HEART RATE: 77 BPM | HEIGHT: 60 IN

## 2021-09-28 DIAGNOSIS — Z34.83 PRENATAL CARE, SUBSEQUENT PREGNANCY IN THIRD TRIMESTER: Primary | ICD-10-CM

## 2021-09-28 DIAGNOSIS — E03.9 HYPOTHYROIDISM, UNSPECIFIED TYPE: ICD-10-CM

## 2021-09-28 PROCEDURE — 99207 PR PRENATAL VISIT: CPT | Performed by: OBSTETRICS & GYNECOLOGY

## 2021-09-28 ASSESSMENT — MIFFLIN-ST. JEOR: SCORE: 1574.99

## 2021-09-28 NOTE — PROGRESS NOTES
[facility]    Return OB Note    CC: Return OB     Subjective:  Denise is a 31 year old  at 36w6d   Denies vaginal bleeding, loss of fluid, or regular contractions. Good fetal movement.  Complaints today: More uncomfortable, having more back pain and pelvic pressure.     Objective:  /86 (BP Location: Left arm, Patient Position: Sitting, Cuff Size: Adult Large)   Pulse 77   Temp 97.3  F (36.3  C) (Tympanic)   Resp 12   Ht 1.524 m (5')   Wt 93.8 kg (206 lb 14.4 oz)   LMP 2020 (LMP Unknown)   BMI 40.41 kg/m      Fundal height: 38cm  FHT: 130bpm  SVE:     Assessment/Plan:   Encounter Diagnoses   Name Primary?     Prenatal care, subsequent pregnancy in third trimester Yes     Hypothyroidism, unspecified type        IUP at 36w6d  -Hypothyroid: continue Synthroid,  TSH normal   -Anemia: Hb improved to 12.6, continue iron. Gestational thrombocytopenia diagnosed on last CBC check (148)  -Obesity: pre-gravid BMI <40, no need for  testing  -S/p TDap and flu vaccines  -GBS negative  -Strict return precautions given    RTC 1 week, interested in membrane sweep    Fozia Montgomery DO

## 2021-10-05 ENCOUNTER — PRENATAL OFFICE VISIT (OUTPATIENT)
Dept: OBGYN | Facility: CLINIC | Age: 31
End: 2021-10-05
Payer: COMMERCIAL

## 2021-10-05 VITALS
RESPIRATION RATE: 12 BRPM | TEMPERATURE: 96.9 F | BODY MASS INDEX: 41.09 KG/M2 | SYSTOLIC BLOOD PRESSURE: 113 MMHG | WEIGHT: 209.3 LBS | DIASTOLIC BLOOD PRESSURE: 63 MMHG | HEIGHT: 60 IN | HEART RATE: 73 BPM

## 2021-10-05 DIAGNOSIS — Z34.83 PRENATAL CARE, SUBSEQUENT PREGNANCY IN THIRD TRIMESTER: Primary | ICD-10-CM

## 2021-10-05 DIAGNOSIS — E03.9 HYPOTHYROIDISM, UNSPECIFIED TYPE: ICD-10-CM

## 2021-10-05 PROCEDURE — 99207 PR PRENATAL VISIT: CPT | Performed by: OBSTETRICS & GYNECOLOGY

## 2021-10-05 ASSESSMENT — MIFFLIN-ST. JEOR: SCORE: 1585.88

## 2021-10-05 NOTE — NURSING NOTE
Initial /63 (BP Location: Right arm, Patient Position: Sitting, Cuff Size: Adult Large)   Pulse 73   Temp 96.9  F (36.1  C) (Tympanic)   Resp 12   Ht 1.524 m (5')   Wt 94.9 kg (209 lb 4.8 oz)   LMP 12/03/2020 (LMP Unknown)   BMI 40.88 kg/m   Estimated body mass index is 40.88 kg/m  as calculated from the following:    Height as of this encounter: 1.524 m (5').    Weight as of this encounter: 94.9 kg (209 lb 4.8 oz). .

## 2021-10-05 NOTE — PROGRESS NOTES
[facility]    Return OB Note    CC: Return OB     Subjective:  Denise is a 31 year old  at 37w6d   Denies vaginal bleeding, loss of fluid, or regular contractions. Good fetal movement.  Complaints today: Getting more swollen, also had a nose bleed again yesterday and today. Nose bleeds are light, self limiting. Also complains of some vaginal swelling on left, on exam suspect resolving Bartholin's gland cyst.     Objective:  /63 (BP Location: Right arm, Patient Position: Sitting, Cuff Size: Adult Large)   Pulse 73   Temp 96.9  F (36.1  C) (Tympanic)   Resp 12   Ht 1.524 m (5')   Wt 94.9 kg (209 lb 4.8 oz)   LMP 2020 (LMP Unknown)   BMI 40.88 kg/m      Fundal height: 38cm  FHT: 150bpm  SVE:     Assessment/Plan:   Encounter Diagnoses   Name Primary?     Prenatal care, subsequent pregnancy in third trimester Yes     Hypothyroidism, unspecified type        IUP at 37w6d  -Hypothyroid: continue Synthroid,  TSH normal   -Anemia: Hb improved to 12.6, continue iron. Gestational thrombocytopenia diagnosed on last CBC check (148)  -Obesity: pre-gravid BMI <40, no need for  testing  -S/p TDap and flu vaccines  -GBS negative  -Strict return precautions given    RTC 1 weeks, interested in IOL at 39 weeks if Bowden improves    Fozia Montgomery, DO

## 2021-10-12 ENCOUNTER — PRENATAL OFFICE VISIT (OUTPATIENT)
Dept: OBGYN | Facility: CLINIC | Age: 31
End: 2021-10-12
Payer: COMMERCIAL

## 2021-10-12 VITALS
HEART RATE: 70 BPM | WEIGHT: 210 LBS | DIASTOLIC BLOOD PRESSURE: 57 MMHG | RESPIRATION RATE: 18 BRPM | TEMPERATURE: 97.4 F | SYSTOLIC BLOOD PRESSURE: 104 MMHG | HEIGHT: 60 IN | BODY MASS INDEX: 41.23 KG/M2

## 2021-10-12 DIAGNOSIS — Z34.83 PRENATAL CARE, SUBSEQUENT PREGNANCY IN THIRD TRIMESTER: Primary | ICD-10-CM

## 2021-10-12 DIAGNOSIS — E03.9 HYPOTHYROIDISM, UNSPECIFIED TYPE: ICD-10-CM

## 2021-10-12 DIAGNOSIS — E66.812 CLASS 2 OBESITY WITHOUT SERIOUS COMORBIDITY IN ADULT, UNSPECIFIED BMI, UNSPECIFIED OBESITY TYPE: ICD-10-CM

## 2021-10-12 PROCEDURE — 99207 PR PRENATAL VISIT: CPT | Performed by: OBSTETRICS & GYNECOLOGY

## 2021-10-12 ASSESSMENT — MIFFLIN-ST. JEOR: SCORE: 1581.11

## 2021-10-12 NOTE — NURSING NOTE
"Initial /57 (BP Location: Right arm, Patient Position: Chair, Cuff Size: Adult Large)   Pulse 70   Temp 97.4  F (36.3  C) (Tympanic)   Resp 18   Ht 1.511 m (4' 11.5\")   Wt 95.3 kg (210 lb)   LMP 12/03/2020 (LMP Unknown)   Breastfeeding No   BMI 41.71 kg/m   Estimated body mass index is 41.71 kg/m  as calculated from the following:    Height as of this encounter: 1.511 m (4' 11.5\").    Weight as of this encounter: 95.3 kg (210 lb). .      "

## 2021-10-12 NOTE — PROGRESS NOTES
"[facility]    Return OB Note    CC: Return OB     Subjective:  Denise is a 31 year old  at 38w6d   Denies vaginal bleeding, loss of fluid, or regular contractions. Good fetal movement.  Complaints today: None    Objective:  /57 (BP Location: Right arm, Patient Position: Chair, Cuff Size: Adult Large)   Pulse 70   Temp 97.4  F (36.3  C) (Tympanic)   Resp 18   Ht 1.511 m (4' 11.5\")   Wt 95.3 kg (210 lb)   LMP 2020 (LMP Unknown)   Breastfeeding No   BMI 41.71 kg/m      Fundal height: 39cm  FHT: 120bpm  SVE: 3/50/    Assessment/Plan:   Encounter Diagnoses   Name Primary?     Prenatal care, subsequent pregnancy in third trimester Yes     Hypothyroidism, unspecified type      Class 2 obesity without serious comorbidity in adult, unspecified BMI, unspecified obesity type        IUP at 38w6d  -Hypothyroid: continue Synthroid,  TSH normal   -Anemia: Hb improved to 12.6, continue iron. Gestational thrombocytopenia diagnosed on last CBC check (148)  -Obesity: pre-gravid BMI <40, no need for  testing  -S/p TDap and flu vaccines  -GBS negative  -Defer IOL for now  -Strict return precautions given    RTC 1 weeks    Fozia Montgomery DO"

## 2021-10-13 ENCOUNTER — ANESTHESIA (OUTPATIENT)
Dept: OBGYN | Facility: CLINIC | Age: 31
End: 2021-10-13
Payer: COMMERCIAL

## 2021-10-13 ENCOUNTER — HOSPITAL ENCOUNTER (INPATIENT)
Facility: CLINIC | Age: 31
LOS: 1 days | Discharge: HOME | End: 2021-10-14
Attending: OBSTETRICS & GYNECOLOGY | Admitting: OBSTETRICS & GYNECOLOGY
Payer: COMMERCIAL

## 2021-10-13 ENCOUNTER — ANESTHESIA EVENT (OUTPATIENT)
Dept: OBGYN | Facility: CLINIC | Age: 31
End: 2021-10-13
Payer: COMMERCIAL

## 2021-10-13 DIAGNOSIS — Z34.80 SUPERVISION OF OTHER NORMAL PREGNANCY: Primary | ICD-10-CM

## 2021-10-13 LAB
ABO/RH(D): NORMAL
ANTIBODY SCREEN: NEGATIVE
BASOPHILS # BLD AUTO: 0 10E3/UL (ref 0–0.2)
BASOPHILS NFR BLD AUTO: 0 %
EOSINOPHIL # BLD AUTO: 0.1 10E3/UL (ref 0–0.7)
EOSINOPHIL NFR BLD AUTO: 1 %
ERYTHROCYTE [DISTWIDTH] IN BLOOD BY AUTOMATED COUNT: 13.3 % (ref 10–15)
HCT VFR BLD AUTO: 38.9 % (ref 35–47)
HGB BLD-MCNC: 13.2 G/DL (ref 11.7–15.7)
IMM GRANULOCYTES # BLD: 0 10E3/UL
IMM GRANULOCYTES NFR BLD: 0 %
LYMPHOCYTES # BLD AUTO: 1.7 10E3/UL (ref 0.8–5.3)
LYMPHOCYTES NFR BLD AUTO: 18 %
MCH RBC QN AUTO: 29.9 PG (ref 26.5–33)
MCHC RBC AUTO-ENTMCNC: 33.9 G/DL (ref 31.5–36.5)
MCV RBC AUTO: 88 FL (ref 78–100)
MONOCYTES # BLD AUTO: 0.5 10E3/UL (ref 0–1.3)
MONOCYTES NFR BLD AUTO: 5 %
NEUTROPHILS # BLD AUTO: 7.1 10E3/UL (ref 1.6–8.3)
NEUTROPHILS NFR BLD AUTO: 76 %
NRBC # BLD AUTO: 0 10E3/UL
NRBC BLD AUTO-RTO: 0 /100
PLATELET # BLD AUTO: 141 10E3/UL (ref 150–450)
RBC # BLD AUTO: 4.42 10E6/UL (ref 3.8–5.2)
SARS-COV-2 RNA RESP QL NAA+PROBE: NEGATIVE
SPECIMEN EXPIRATION DATE: NORMAL
T PALLIDUM AB SER QL: NONREACTIVE
WBC # BLD AUTO: 9.6 10E3/UL (ref 4–11)

## 2021-10-13 PROCEDURE — 250N000013 HC RX MED GY IP 250 OP 250 PS 637: Performed by: OBSTETRICS & GYNECOLOGY

## 2021-10-13 PROCEDURE — 3E0R3BZ INTRODUCTION OF ANESTHETIC AGENT INTO SPINAL CANAL, PERCUTANEOUS APPROACH: ICD-10-PCS | Performed by: NURSE ANESTHETIST, CERTIFIED REGISTERED

## 2021-10-13 PROCEDURE — 00HU33Z INSERTION OF INFUSION DEVICE INTO SPINAL CANAL, PERCUTANEOUS APPROACH: ICD-10-PCS | Performed by: NURSE ANESTHETIST, CERTIFIED REGISTERED

## 2021-10-13 PROCEDURE — 36415 COLL VENOUS BLD VENIPUNCTURE: CPT | Performed by: OBSTETRICS & GYNECOLOGY

## 2021-10-13 PROCEDURE — 87635 SARS-COV-2 COVID-19 AMP PRB: CPT | Performed by: OBSTETRICS & GYNECOLOGY

## 2021-10-13 PROCEDURE — 250N000009 HC RX 250: Performed by: OBSTETRICS & GYNECOLOGY

## 2021-10-13 PROCEDURE — 86901 BLOOD TYPING SEROLOGIC RH(D): CPT | Performed by: OBSTETRICS & GYNECOLOGY

## 2021-10-13 PROCEDURE — 258N000003 HC RX IP 258 OP 636: Performed by: OBSTETRICS & GYNECOLOGY

## 2021-10-13 PROCEDURE — 370N000003 HC ANESTHESIA WARD SERVICE

## 2021-10-13 PROCEDURE — 120N000001 HC R&B MED SURG/OB

## 2021-10-13 PROCEDURE — 59410 OBSTETRICAL CARE: CPT | Performed by: OBSTETRICS & GYNECOLOGY

## 2021-10-13 PROCEDURE — 250N000009 HC RX 250: Performed by: NURSE ANESTHETIST, CERTIFIED REGISTERED

## 2021-10-13 PROCEDURE — 85025 COMPLETE CBC W/AUTO DIFF WBC: CPT | Performed by: OBSTETRICS & GYNECOLOGY

## 2021-10-13 PROCEDURE — 258N000003 HC RX IP 258 OP 636: Performed by: NURSE ANESTHETIST, CERTIFIED REGISTERED

## 2021-10-13 PROCEDURE — 250N000011 HC RX IP 250 OP 636: Performed by: NURSE ANESTHETIST, CERTIFIED REGISTERED

## 2021-10-13 PROCEDURE — 86780 TREPONEMA PALLIDUM: CPT | Performed by: OBSTETRICS & GYNECOLOGY

## 2021-10-13 PROCEDURE — 722N000001 HC LABOR CARE VAGINAL DELIVERY SINGLE

## 2021-10-13 RX ORDER — MISOPROSTOL 200 UG/1
800 TABLET ORAL
Status: DISCONTINUED | OUTPATIENT
Start: 2021-10-13 | End: 2021-10-14 | Stop reason: HOSPADM

## 2021-10-13 RX ORDER — ONDANSETRON 2 MG/ML
4 INJECTION INTRAMUSCULAR; INTRAVENOUS EVERY 6 HOURS PRN
Status: DISCONTINUED | OUTPATIENT
Start: 2021-10-13 | End: 2021-10-13

## 2021-10-13 RX ORDER — DOCUSATE SODIUM 100 MG/1
100 CAPSULE, LIQUID FILLED ORAL DAILY
Status: DISCONTINUED | OUTPATIENT
Start: 2021-10-13 | End: 2021-10-14 | Stop reason: HOSPADM

## 2021-10-13 RX ORDER — NALOXONE HYDROCHLORIDE 0.4 MG/ML
0.4 INJECTION, SOLUTION INTRAMUSCULAR; INTRAVENOUS; SUBCUTANEOUS
Status: DISCONTINUED | OUTPATIENT
Start: 2021-10-13 | End: 2021-10-13

## 2021-10-13 RX ORDER — NALOXONE HYDROCHLORIDE 0.4 MG/ML
0.2 INJECTION, SOLUTION INTRAMUSCULAR; INTRAVENOUS; SUBCUTANEOUS
Status: DISCONTINUED | OUTPATIENT
Start: 2021-10-13 | End: 2021-10-14 | Stop reason: HOSPADM

## 2021-10-13 RX ORDER — ACETAMINOPHEN 325 MG/1
650 TABLET ORAL EVERY 4 HOURS PRN
Status: DISCONTINUED | OUTPATIENT
Start: 2021-10-13 | End: 2021-10-14 | Stop reason: HOSPADM

## 2021-10-13 RX ORDER — OXYTOCIN/0.9 % SODIUM CHLORIDE 30/500 ML
340 PLASTIC BAG, INJECTION (ML) INTRAVENOUS CONTINUOUS PRN
Status: DISCONTINUED | OUTPATIENT
Start: 2021-10-13 | End: 2021-10-13

## 2021-10-13 RX ORDER — CARBOPROST TROMETHAMINE 250 UG/ML
250 INJECTION, SOLUTION INTRAMUSCULAR
Status: DISCONTINUED | OUTPATIENT
Start: 2021-10-13 | End: 2021-10-14 | Stop reason: HOSPADM

## 2021-10-13 RX ORDER — PROCHLORPERAZINE 25 MG
25 SUPPOSITORY, RECTAL RECTAL EVERY 12 HOURS PRN
Status: DISCONTINUED | OUTPATIENT
Start: 2021-10-13 | End: 2021-10-13

## 2021-10-13 RX ORDER — NALOXONE HYDROCHLORIDE 0.4 MG/ML
0.2 INJECTION, SOLUTION INTRAMUSCULAR; INTRAVENOUS; SUBCUTANEOUS
Status: DISCONTINUED | OUTPATIENT
Start: 2021-10-13 | End: 2021-10-13

## 2021-10-13 RX ORDER — MISOPROSTOL 200 UG/1
400 TABLET ORAL
Status: DISCONTINUED | OUTPATIENT
Start: 2021-10-13 | End: 2021-10-14 | Stop reason: HOSPADM

## 2021-10-13 RX ORDER — METOCLOPRAMIDE HYDROCHLORIDE 5 MG/ML
10 INJECTION INTRAMUSCULAR; INTRAVENOUS EVERY 6 HOURS PRN
Status: DISCONTINUED | OUTPATIENT
Start: 2021-10-13 | End: 2021-10-13

## 2021-10-13 RX ORDER — EPHEDRINE SULFATE 50 MG/ML
INJECTION, SOLUTION INTRAMUSCULAR; INTRAVENOUS; SUBCUTANEOUS
Status: DISCONTINUED
Start: 2021-10-13 | End: 2021-10-13 | Stop reason: WASHOUT

## 2021-10-13 RX ORDER — KETOROLAC TROMETHAMINE 30 MG/ML
30 INJECTION, SOLUTION INTRAMUSCULAR; INTRAVENOUS
Status: DISCONTINUED | OUTPATIENT
Start: 2021-10-13 | End: 2021-10-13

## 2021-10-13 RX ORDER — SODIUM CHLORIDE, SODIUM LACTATE, POTASSIUM CHLORIDE, CALCIUM CHLORIDE 600; 310; 30; 20 MG/100ML; MG/100ML; MG/100ML; MG/100ML
INJECTION, SOLUTION INTRAVENOUS CONTINUOUS
Status: DISCONTINUED | OUTPATIENT
Start: 2021-10-13 | End: 2021-10-13

## 2021-10-13 RX ORDER — EPHEDRINE SULFATE 50 MG/ML
5 INJECTION, SOLUTION INTRAMUSCULAR; INTRAVENOUS; SUBCUTANEOUS
Status: DISCONTINUED | OUTPATIENT
Start: 2021-10-13 | End: 2021-10-13

## 2021-10-13 RX ORDER — NALOXONE HYDROCHLORIDE 0.4 MG/ML
0.4 INJECTION, SOLUTION INTRAMUSCULAR; INTRAVENOUS; SUBCUTANEOUS
Status: DISCONTINUED | OUTPATIENT
Start: 2021-10-13 | End: 2021-10-14 | Stop reason: HOSPADM

## 2021-10-13 RX ORDER — PROCHLORPERAZINE MALEATE 10 MG
10 TABLET ORAL EVERY 6 HOURS PRN
Status: DISCONTINUED | OUTPATIENT
Start: 2021-10-13 | End: 2021-10-13

## 2021-10-13 RX ORDER — IBUPROFEN 600 MG/1
600 TABLET, FILM COATED ORAL
Status: DISCONTINUED | OUTPATIENT
Start: 2021-10-13 | End: 2021-10-13

## 2021-10-13 RX ORDER — TRANEXAMIC ACID 10 MG/ML
1 INJECTION, SOLUTION INTRAVENOUS EVERY 30 MIN PRN
Status: DISCONTINUED | OUTPATIENT
Start: 2021-10-13 | End: 2021-10-14 | Stop reason: HOSPADM

## 2021-10-13 RX ORDER — METOCLOPRAMIDE 10 MG/1
10 TABLET ORAL EVERY 6 HOURS PRN
Status: DISCONTINUED | OUTPATIENT
Start: 2021-10-13 | End: 2021-10-13

## 2021-10-13 RX ORDER — OXYTOCIN 10 [USP'U]/ML
10 INJECTION, SOLUTION INTRAMUSCULAR; INTRAVENOUS
Status: DISCONTINUED | OUTPATIENT
Start: 2021-10-13 | End: 2021-10-14 | Stop reason: HOSPADM

## 2021-10-13 RX ORDER — MODIFIED LANOLIN
OINTMENT (GRAM) TOPICAL
Status: DISCONTINUED | OUTPATIENT
Start: 2021-10-13 | End: 2021-10-14 | Stop reason: HOSPADM

## 2021-10-13 RX ORDER — NALBUPHINE HYDROCHLORIDE 10 MG/ML
2.5-5 INJECTION, SOLUTION INTRAMUSCULAR; INTRAVENOUS; SUBCUTANEOUS EVERY 6 HOURS PRN
Status: DISCONTINUED | OUTPATIENT
Start: 2021-10-13 | End: 2021-10-13

## 2021-10-13 RX ORDER — ONDANSETRON 4 MG/1
4 TABLET, ORALLY DISINTEGRATING ORAL EVERY 6 HOURS PRN
Status: DISCONTINUED | OUTPATIENT
Start: 2021-10-13 | End: 2021-10-13

## 2021-10-13 RX ORDER — MISOPROSTOL 200 UG/1
800 TABLET ORAL
Status: DISCONTINUED | OUTPATIENT
Start: 2021-10-13 | End: 2021-10-13

## 2021-10-13 RX ORDER — OXYCODONE HYDROCHLORIDE 5 MG/1
5 TABLET ORAL EVERY 4 HOURS PRN
Status: DISCONTINUED | OUTPATIENT
Start: 2021-10-13 | End: 2021-10-14 | Stop reason: HOSPADM

## 2021-10-13 RX ORDER — CARBOPROST TROMETHAMINE 250 UG/ML
250 INJECTION, SOLUTION INTRAMUSCULAR
Status: DISCONTINUED | OUTPATIENT
Start: 2021-10-13 | End: 2021-10-13

## 2021-10-13 RX ORDER — LEVOTHYROXINE SODIUM 125 UG/1
125 TABLET ORAL DAILY
Status: DISCONTINUED | OUTPATIENT
Start: 2021-10-13 | End: 2021-10-14 | Stop reason: HOSPADM

## 2021-10-13 RX ORDER — OXYTOCIN 10 [USP'U]/ML
10 INJECTION, SOLUTION INTRAMUSCULAR; INTRAVENOUS
Status: DISCONTINUED | OUTPATIENT
Start: 2021-10-13 | End: 2021-10-13

## 2021-10-13 RX ORDER — BISACODYL 10 MG
10 SUPPOSITORY, RECTAL RECTAL DAILY PRN
Status: DISCONTINUED | OUTPATIENT
Start: 2021-10-13 | End: 2021-10-14 | Stop reason: HOSPADM

## 2021-10-13 RX ORDER — LIDOCAINE HYDROCHLORIDE AND EPINEPHRINE 15; 5 MG/ML; UG/ML
INJECTION, SOLUTION EPIDURAL PRN
Status: DISCONTINUED | OUTPATIENT
Start: 2021-10-13 | End: 2021-10-13

## 2021-10-13 RX ORDER — MISOPROSTOL 200 UG/1
400 TABLET ORAL
Status: DISCONTINUED | OUTPATIENT
Start: 2021-10-13 | End: 2021-10-13

## 2021-10-13 RX ORDER — TRANEXAMIC ACID 10 MG/ML
1 INJECTION, SOLUTION INTRAVENOUS EVERY 30 MIN PRN
Status: DISCONTINUED | OUTPATIENT
Start: 2021-10-13 | End: 2021-10-13

## 2021-10-13 RX ORDER — METHYLERGONOVINE MALEATE 0.2 MG/ML
200 INJECTION INTRAVENOUS
Status: DISCONTINUED | OUTPATIENT
Start: 2021-10-13 | End: 2021-10-13

## 2021-10-13 RX ORDER — OXYTOCIN/0.9 % SODIUM CHLORIDE 30/500 ML
100-340 PLASTIC BAG, INJECTION (ML) INTRAVENOUS CONTINUOUS PRN
Status: DISCONTINUED | OUTPATIENT
Start: 2021-10-13 | End: 2021-10-13

## 2021-10-13 RX ORDER — METHYLERGONOVINE MALEATE 0.2 MG/ML
200 INJECTION INTRAVENOUS
Status: DISCONTINUED | OUTPATIENT
Start: 2021-10-13 | End: 2021-10-14 | Stop reason: HOSPADM

## 2021-10-13 RX ORDER — OXYTOCIN/0.9 % SODIUM CHLORIDE 30/500 ML
340 PLASTIC BAG, INJECTION (ML) INTRAVENOUS CONTINUOUS PRN
Status: DISCONTINUED | OUTPATIENT
Start: 2021-10-13 | End: 2021-10-14 | Stop reason: HOSPADM

## 2021-10-13 RX ORDER — HYDROCORTISONE 2.5 %
CREAM (GRAM) TOPICAL 3 TIMES DAILY PRN
Status: DISCONTINUED | OUTPATIENT
Start: 2021-10-13 | End: 2021-10-14 | Stop reason: HOSPADM

## 2021-10-13 RX ORDER — IBUPROFEN 800 MG/1
800 TABLET, FILM COATED ORAL EVERY 6 HOURS PRN
Status: DISCONTINUED | OUTPATIENT
Start: 2021-10-13 | End: 2021-10-14 | Stop reason: HOSPADM

## 2021-10-13 RX ADMIN — LIDOCAINE HYDROCHLORIDE AND EPINEPHRINE 5 ML: 15; 5 INJECTION, SOLUTION EPIDURAL at 06:50

## 2021-10-13 RX ADMIN — SODIUM CHLORIDE, POTASSIUM CHLORIDE, SODIUM LACTATE AND CALCIUM CHLORIDE 1000 ML: 600; 310; 30; 20 INJECTION, SOLUTION INTRAVENOUS at 05:55

## 2021-10-13 RX ADMIN — IBUPROFEN 800 MG: 800 TABLET ORAL at 16:12

## 2021-10-13 RX ADMIN — Medication 10 ML/HR: at 06:53

## 2021-10-13 RX ADMIN — Medication 340 ML/HR: at 08:22

## 2021-10-13 RX ADMIN — SODIUM CHLORIDE, POTASSIUM CHLORIDE, SODIUM LACTATE AND CALCIUM CHLORIDE: 600; 310; 30; 20 INJECTION, SOLUTION INTRAVENOUS at 07:01

## 2021-10-13 NOTE — ANESTHESIA PROCEDURE NOTES
Epidural catheter Procedure Note    Pre-Procedure   Staff -        CRNA: Taiwo Deng APRN CRNA       Performed By: CRNA       Location: OB       Pre-Anesthestic Checklist: patient identified, IV checked, risks and benefits discussed, informed consent, monitors and equipment checked, pre-op evaluation and at physician/surgeon's request  Timeout:       Correct Patient: Yes        Correct Procedure: Yes        Correct Site: Yes        Correct Position: Yes   Procedure Documentation  Procedure: epidural catheter       Patient Position: sitting       Patient Prep/Sterile Barriers: sterile gloves, mask, patient draped       Skin prep: DuraPrep      Local skin infiltrated with mL of 1% lidocaine.        Insertion Site: L4-5. (midline approach).       Technique: LORT saline        Needle Type: ToTap 'n Tapy needle       Needle Gauge: 17.        Needle Length (Inches): 3.5        Catheter: 19 G.         Catheter threaded easily.         4 cm epidural space.         Threaded 10 cm at skin.         # of attempts: 3 and  # of redirects:  3    Assessment/Narrative         Paresthesias: No.    Test dose of mL at.         Test dose negative, 3 minutes after injection, for signs of intravascular, subdural, or intrathecal injection.       Insertion/Infusion Method: LORT saline       Aspiration negative for Heme or CSF via Epidural Catheter.     Comments:  VAS pain score prior to epidural:8    VAS pain score after epidural:0    Pt. Tolerated well, FHR stable.

## 2021-10-13 NOTE — ANESTHESIA PREPROCEDURE EVALUATION
Anesthesia Pre-Procedure Evaluation    Patient: Denise Cazares   MRN: [id number] : 1990        Preoperative Diagnosis: * No pre-op diagnosis entered *    Procedure : * No procedures listed *          Past Medical History:   Diagnosis Date     Anemia      Chickenpox      Endometrial polyp      Hypothyroidism       Past Surgical History:   Procedure Laterality Date     AS HYSTEROSCOPY W ENDOMETRIAL BX/POLYPECTOMY W/WO D&C       LAPAROSCOPIC CHOLECYSTECTOMY       TONSILLECTOMY        No Known Allergies   Social History     Tobacco Use     Smoking status: Never Smoker     Smokeless tobacco: Never Used   Substance Use Topics     Alcohol use: Not Currently     Comment: occasionally-quit with pregnnacy      Wt Readings from Last 1 Encounters:   10/12/21 95.3 kg (210 lb)        Anesthesia Evaluation            ROS/MED HX  ENT/Pulmonary:     (+) MARY risk factors,     Neurologic:  - neg neurologic ROS     Cardiovascular:  - neg cardiovascular ROS     METS/Exercise Tolerance:     Hematologic:  - neg hematologic  ROS     Musculoskeletal: Comment: Knee pain      GI/Hepatic:  - neg GI/hepatic ROS     Renal/Genitourinary:  - neg Renal ROS     Endo:     (+) thyroid problem, hypothyroidism, Obesity,     Psychiatric/Substance Use:       Infectious Disease:  - neg infectious disease ROS     Malignancy:  - neg malignancy ROS     Other:      (+) Possibly pregnant, ,            OUTSIDE LABS:  CBC:   Lab Results   Component Value Date    WBC 9.6 10/13/2021    WBC 8.0 2021    HGB 13.2 10/13/2021    HGB 12.6 2021    HCT 38.9 10/13/2021    HCT 37.6 2021     (L) 10/13/2021     (L) 2021     BMP: No results found for: NA, POTASSIUM, CHLORIDE, CO2, BUN, CR, GLC  COAGS: No results found for: PTT, INR, FIBR  POC:   Lab Results   Component Value Date    HCG Positive (A) 2021     HEPATIC: No results found for: ALBUMIN, PROTTOTAL, ALT, AST, GGT, ALKPHOS, BILITOTAL, BILIDIRECT, HITESH  OTHER:   Lab  Results   Component Value Date    TSH 0.58 09/21/2021    T4 0.99 08/04/2021       Anesthesia Plan    ASA Status:  3      Anesthesia Type: Epidural.              Consents    Anesthesia Plan(s) and associated risks, benefits, and realistic alternatives discussed. Questions answered and patient/representative(s) expressed understanding.     - Discussed with:  Patient         Postoperative Care            Comments:    Pre-eval performed by MIKE Hubbard CRNA, CRNA

## 2021-10-13 NOTE — ANESTHESIA CARE TRANSFER NOTE
Patient: Denise KINNEY Sage    Procedure: * No procedures listed *       Diagnosis: * No pre-op diagnosis entered *  Diagnosis Additional Information: No value filed.    Anesthesia Type:   Epidural     Note:    Oropharynx: spontaneously breathing  Level of Consciousness: awake  Oxygen Supplementation: room air    Independent Airway: airway patency satisfactory and stable  Dentition: dentition unchanged  Vital Signs Stable: post-procedure vital signs reviewed and stable    Patient transferred to: Labor and Delivery    Handoff Report: Identifed the Patient, Identified the Reponsible Provider, Reviewed the pertinent medical history, Discussed the surgical course, Reviewed Intra-OP anesthesia mangement and issues during anesthesia, Set expectations for post-procedure period and Allowed opportunity for questions and acknowledgement of understanding      Vitals:  Vitals Value Taken Time   BP     Temp     Pulse     Resp     SpO2         Electronically Signed By: MIKE Rayo [facility]  October 13, 2021  6:29 PM

## 2021-10-13 NOTE — PROGRESS NOTES
Up to BR for pericare. Pt was able to void. Mother and baby transferred to postpartum unit room 2044 at 1105 via ambulatory and bassinet after completion of immediate recovery period. Patient oriented to room and instructed to call for assistance when up to the bathroom the next time. Report given to MIGUEL Muro who assumes patient care. Mother and baby bonding well and in stable condition upon transfer.

## 2021-10-13 NOTE — L&D DELIVERY NOTE
"Delivery Summary    Denise Cazares MRN# [id number]   Age: 31 year old YOB: 1990     ASSESSMENT & PLAN: 30 y/o  at 39 weeks admitted with SROM and spontaneous labor; GBS negative; epidural given for labor analgesia; she had normal stage 1 progress, pushed effectively to slow crown, with  liveborn female over intact perineum  Placenta spontaneous, visually intact  QBL 150cc  \"Rosielys\"  7 lb 15 oz  Karma Baez MD  [facility]         [facility]age, Pending Baby Dneise [[id number]]    Labor Event Times    Labor onset date: 10/13/21 Onset time:  3:30 AM   Dilation complete date: 10/13/21       Labor Events     labor?: No   steroids: None  Labor Type: Spontaneous  Predominate monitoring during 1st stage: continuous electronic fetal monitoring     Antibiotics received during labor?: No     Rupture date/time: 10/13/21 0339   Rupture type: Spontaneous rupture of membranes occuring during spontaneous labor or augmentation  Fluid color: Clear  Fluid odor: Normal     Augmentation: None  1:1 continuous labor support provided by?: RN       Delivery/Placenta Date and Time    Delivery Date: 10/13/21 Delivery Time:  8:20 AM   Placenta Date/Time: 10/13/2021  8:23 AM  Oxytocin given at the time of delivery: after delivery of baby  Delivering clinician: Karma Baez MD   Other personnel present at delivery:  Provider Role   Corinne Nash RN Delivery Nurse   Erika Estrada RN Charge Nurse   Karma Baez MD Obstetrician         Vaginal Counts     Initial count performed by 2 team members:  Two Team Members   Dr. Nestor Estrada RN       Needles Suture Needles Sponges (RETIRED) Instruments   Initial counts 2  5    Added to count       Relief counts       Final counts             Placed during labor Accounted for at the end of labor   FSE     IUPC     Cervadil                       Apgars    Living status: Living   1 Minute 5 Minute 10 Minute " 15 Minute 20 Minute   Skin color: 1        Heart rate: 2        Reflex irritability: 2        Muscle tone: 2        Respiratory effort: 1        Total: 8        Apgars assigned by: AUGUSTINE LOZANO RN     Cord    Cord Complications: None               Stem cell collection?: No       Labor Events and Shoulder Dystocia    Fetal Tracing Prior to Delivery: Category 1     Delivery (Maternal) (Provider to Complete) ([id number])    Episiotomy: None  Perineal lacerations: None    Repair suture: None  Genital tract inspection done: Pos     Blood Loss  Mother: Denise Cazares #[id number]   Start of Mother's Information    Delivery Blood Loss  10/13/21 0330 - 10/13/21 0830    Delivery QBL (mL) Hospital Encounter 150 mL    Total  150 mL         End of Mother's Information  Mother: Denise Cazares #[id number]          Delivery - Provider to Complete ([id number])    Delivering clinician: Karma Baez MD  Attempted Delivery Types (Choose all that apply): Spontaneous Vaginal Delivery  Delivery Type (Choose the 1 that will go to the Birth History): Vaginal, Spontaneous                   Other personnel:  Provider Role   Corinne Lozano RN Delivery Nurse   Erika Estrada RN Charge Nurse   Karma Baez MD Obstetrician                Placenta    Date/Time: 10/13/2021  8:23 AM  Removal: Spontaneous  Disposition: Hospital disposal           Anesthesia    Method: Epidural  Cervical dilation at placement: 4-7                Presentation and Position    Presentation: Vertex    Position: Right Occiput Anterior                 Karma Baez MD

## 2021-10-13 NOTE — H&P
[facility] Labor and Delivery History and Physical    Denise Cazares MRN# [id number]   Age: 31 year old YOB: 1990     Date of Admission:  10/13/2021    Primary care provider: Kristina, [facility]           Chief Complaint:   Denise Cazares is a 31 year old female who is 39w0d pregnant and being admitted for SROM at 0330, with contractions  GBS negative  H/o hypothyroid.          Pregnancy history:     OBSTETRIC HISTORY:    OB History    Para Term  AB Living   4 2 2 0 1 2   SAB TAB Ectopic Multiple Live Births   0 0 0 0 2      # Outcome Date GA Lbr Dk/2nd Weight Sex Delivery Anes PTL Lv   4 Current            3 Term 13 40w0d  3.459 kg (7 lb 10 oz) F    DAVID      Name: Shania   2 Term 11 37w0d  2.948 kg (6 lb 8 oz) F    DAVID      Name: Sri   1 2009              Birth Comments: blighted ovum       EDC: Estimated Date of Delivery: 10/20/21    Prenatal Labs:   Lab Results   Component Value Date    ABO O 2021    RH Pos 2021    AS Neg 2021    HEPBANG Nonreactive 2021    CHPCRT Negative 2021    GCPCRT Negative 2021    HGB 13.2 10/13/2021       GBS Status:   No results found for: GBS    Active Problem List  Patient Active Problem List   Diagnosis     Prenatal care, subsequent pregnancy     Right knee pain     Hypothyroidism     Class 2 obesity in adult     Supervision of other normal pregnancy       Medication Prior to Admission  Facility-Administered Medications Prior to Admission   Medication Dose Route Frequency Provider Last Rate Last Admin     bupivacaine (MARCAINE) 0.25 % injection 4 mL  4 mL   Forrest Jerome MD   4 mL at 02/10/21 5489     Medications Prior to Admission   Medication Sig Dispense Refill Last Dose     ferrous gluconate (FERGON) 324 (38 Fe) MG tablet Take 1 tablet (324 mg) by mouth daily (with breakfast) 60 tablet 3 10/12/2021 at Unknown time     levothyroxine  (SYNTHROID/LEVOTHROID) 125 MCG tablet Take 1 tablet (125 mcg) by mouth daily 90 tablet 1 10/13/2021 at Unknown time     Prenatal Vit-Fe Fumarate-FA (PRENATAL MULTIVITAMIN W/IRON) 27-0.8 MG tablet Take 1 tablet by mouth daily 90 tablet 3 10/12/2021 at Unknown time     Misc. Devices (BREAST PUMP) MISC 1 each daily 1 each 0    .        Maternal Past Medical History:     Past Medical History:   Diagnosis Date     Anemia      Chickenpox      Endometrial polyp      Hypothyroidism                        Family History:     Family History   Problem Relation Age of Onset     Diabetes Mother      Hypertension Mother      No Known Problems Father      Other - See Comments Maternal Grandfather         MVA     Family history reviewed and updated in [facility]            Social History:     Social History     Tobacco Use     Smoking status: Never Smoker     Smokeless tobacco: Never Used   Substance Use Topics     Alcohol use: Not Currently     Comment: occasionally-quit with pregnnacy            Review of Systems:   The Review of Systems is negative other than noted in the HPI          Physical Exam:     Vitals were reviewed  Patient Vitals for the past 8 hrs:   BP Temp Temp src Pulse Resp   10/13/21 0539 120/76 98.1  F (36.7  C) Oral 65 20     Constitutional:   Controlled breathing through contractions; otherwise alert     Lungs:   No increased work of breathing, good air exchange, clear to auscultation bilaterally, no crackles or wheezing     Cardiovascular:   Normal apical impulse, regular rate and rhythm, normal S1 and S2, no S3 or S4, and no murmur noted     Abdomen:   Gravid, cephalic, S=D      Cervix:   Membranes: SROM  clear amniotic fluid   Dilation: 5   Effacement: 90%   Station:-1   Consistency: soft   Position: Anterior  Presentation:Cephalic  Fetal Heart Rate Tracing: reactive and reassuring  Tocometer: external monitor and frequency q 2-3 minutes                       Assessment:   Denise Cazares is a 39w0d pregnant  female admitted with labor management and SROM.          Plan:   Admit - see IP orders  Pain medication epidural  Anticipate     Karma Baez MD

## 2021-10-13 NOTE — PLAN OF CARE
BSS. Postpartum assessment wnl-see flow sheet. Pt up independently in room tolerating activity. Voiding spontaneously. Attentive to infants needs. Independent with self and infants cares. Call  light in reach,  at bedside.

## 2021-10-13 NOTE — PLAN OF CARE
S:Delivery  B:Spontaneous Labor,39w0d    No results found for: GBS with antibiotic treatment not indicated 4 hours prior to delivery.  A: Patient delivered   none, intact at 0820 with Dr. RADHA Baez in attendance and baby placed on mother's abdomen for delayed cord clamping. Baby dried and stimulated. Baby placed  skin to skin @ 0820. Apgars 8/8.Delivery .  IV infusion of Oxytocin  infused. Placenta removal spontaneous. MD does not want placenta sent to pathology.  See Flowsheet for VS and PP checks. Delivery QBL (mL): 150 mL.  Labor care plan goals met, transition now to postpartum care.  R: Expect routine postpartum care. Anticipate first feeding within the hour or whenever infant displays feeding cues. Continue skin to skin. Prior discussion with mother indicates that feeding plan is Breast feeding . Educated mother on importance of exclusive breastfeeding, expected feeding readiness cues and encouraged her to observe for these cues while rooming in. Informed her that breastfeeding assistance would be provided.

## 2021-10-14 VITALS
OXYGEN SATURATION: 97 % | RESPIRATION RATE: 18 BRPM | WEIGHT: 195.8 LBS | TEMPERATURE: 98.1 F | DIASTOLIC BLOOD PRESSURE: 74 MMHG | SYSTOLIC BLOOD PRESSURE: 131 MMHG | BODY MASS INDEX: 38.88 KG/M2 | HEART RATE: 58 BPM

## 2021-10-14 PROCEDURE — 250N000013 HC RX MED GY IP 250 OP 250 PS 637: Performed by: OBSTETRICS & GYNECOLOGY

## 2021-10-14 RX ADMIN — IBUPROFEN 800 MG: 800 TABLET ORAL at 12:36

## 2021-10-14 RX ADMIN — DOCUSATE SODIUM 100 MG: 100 CAPSULE, LIQUID FILLED ORAL at 09:20

## 2021-10-14 RX ADMIN — IBUPROFEN 800 MG: 800 TABLET ORAL at 00:20

## 2021-10-14 RX ADMIN — IBUPROFEN 800 MG: 800 TABLET ORAL at 06:32

## 2021-10-14 NOTE — DISCHARGE SUMMARY
[facility] Discharge Summary    Denise Cazares MRN# [id number]   Age: 31 year old YOB: 1990     Date of Admission:  10/13/2021  Date of Discharge::  10/14/2021  Admitting Physician:  Karma Baez MD  Discharge Physician:  Brigitte Storey MD     Home clinic: [facility]          Admission Diagnoses:   Intrauterine pregnancy at 39w0d weeks gestation          Discharge Diagnosis:   Normal spontaneous vaginal delivery  Intrauterine pregnancy at 39w0d weeks gestation          Procedures:   Procedure(s): No additional procedures performed       No other procedures performed during this admission           Medications Prior to Admission:     Facility-Administered Medications Prior to Admission   Medication Dose Route Frequency Provider Last Rate Last Admin     [DISCONTINUED] bupivacaine (MARCAINE) 0.25 % injection 4 mL  4 mL   Forrest Jerome MD   4 mL at 02/10/21 1719     Medications Prior to Admission   Medication Sig Dispense Refill Last Dose     ferrous gluconate (FERGON) 324 (38 Fe) MG tablet Take 1 tablet (324 mg) by mouth daily (with breakfast) 60 tablet 3 10/12/2021 at Unknown time     levothyroxine (SYNTHROID/LEVOTHROID) 125 MCG tablet Take 1 tablet (125 mcg) by mouth daily 90 tablet 1 10/13/2021 at Unknown time     Prenatal Vit-Fe Fumarate-FA (PRENATAL MULTIVITAMIN W/IRON) 27-0.8 MG tablet Take 1 tablet by mouth daily 90 tablet 3 10/12/2021 at Unknown time     Misc. Devices (BREAST PUMP) MISC 1 each daily 1 each 0              Discharge Medications:     Current Discharge Medication List      CONTINUE these medications which have NOT CHANGED    Details   ferrous gluconate (FERGON) 324 (38 Fe) MG tablet Take 1 tablet (324 mg) by mouth daily (with breakfast)  Qty: 60 tablet, Refills: 3    Associated Diagnoses: Effusion of right knee; Iron deficiency anemia due to chronic blood loss      levothyroxine (SYNTHROID/LEVOTHROID) 125 MCG tablet Take  "1 tablet (125 mcg) by mouth daily  Qty: 90 tablet, Refills: 1    Associated Diagnoses: Hypothyroidism, unspecified type      Prenatal Vit-Fe Fumarate-FA (PRENATAL MULTIVITAMIN W/IRON) 27-0.8 MG tablet Take 1 tablet by mouth daily  Qty: 90 tablet, Refills: 3    Associated Diagnoses: Amenorrhea      Misc. Devices (BREAST PUMP) MISC 1 each daily  Qty: 1 each, Refills: 0    Associated Diagnoses: Lactating mother                   Consultations:   No consultations were requested during this admission          Brief History of Labor:   30 y/o  at 39 weeks admitted with SROM and spontaneous labor; GBS negative; epidural given for labor analgesia; she had normal stage 1 progress, pushed effectively to slow crown, with  liveborn female over intact perineum  Placenta spontaneous, visually intact  QBL 150cc  \"Rosielys\"  7 lb 15 oz           Hospital Course:   The patient's hospital course was unremarkable.  On discharge, her pain was well controlled. Vaginal bleeding is similar to peak menstrual flow.  Voiding without difficulty.  Ambulating well and tolerating a normal diet.  No fever.  Breastfeeding well.  Infant is stable.  No bowel movement yet.   She was discharged on post-partum day #1.    Post-partum hemoglobin:   Hemoglobin   Date Value Ref Range Status   10/13/2021 13.2 11.7 - 15.7 g/dL Final   2021 11.7 11.7 - 15.7 g/dL Final             Discharge Instructions and Follow-Up:   Discharge diet: Regular   Discharge activity: No sex for 6 week(s)   Discharge follow-up: Follow up with OB clinic in 6 weeks  Call OB clinic in 3 weeks if still considering tubal ligation   Wound care: Drink plenty of fluids  Ice to area for comfort           Discharge Disposition:   Discharged to home      Attestation:  I have reviewed today's vital signs, notes, medications, labs and imaging.    Brigitte Storey MD     "

## 2021-10-14 NOTE — DISCHARGE INSTRUCTIONS
Storing Expressed Milk  You can express your milk and store it in clean containers. Your family or a sitter can feed it to the baby. This way, your baby gets the benefits of your milk even when you can't be there at feeding time.   Type of storage Storage times   Room temperature          At room temperature (up to 78 F or 26 C)    Tip: Keep the container clean, covered, and cool. 3 to 4 hours is best; 6 to 8 hours is acceptable under very clean conditions   Refrigerator          In a refrigerator (less than 39 F or less than 4 C)    Tip: Place milk in the back of the main section of the refrigerator. 72 hours is best; up to 8 days is acceptable under very clean conditions   Freezer           In a freezer (0 F or -17 C)    Tip: Store milk toward the back of the freezer. 6 months is best; 12 months is acceptable   Guidelines for milk storage  Always use a clean container to collect and store milk. Never pour warm expressed milk into a bottle with cold milk. And be sure to label and date each bottle or bag of milk. To store milk safely, see the chart above.    Warming stored milk  Thaw frozen milk in the refrigerator or in a bowl of warm water. It s a good idea to warm refrigerated milk before using it. For your baby s safety:    Use the oldest milk first    Warm a container of milk by putting it in a bowl of warm (not hot) water for a few minutes. Or use a bottle warmer set on low.    Gently swirl the milk to mix it. Then place a few drops on your wrist. The milk should be near room temperature.    Don t put the milk in a microwave. This could create pockets of hot liquid that can burn your baby s mouth.  [employer] last reviewed this educational content on 6/1/2019 2000-2021 [employer]. All rights reserved. This information is not intended as a substitute for professional medical care. Always follow your healthcare professional's instructions.      Discharge diet: Regular   Discharge activity: No  sex for 6 week(s)   Discharge follow-up: Follow up with OB clinic in 6 weeks  Call OB clinic in 3 weeks if still considering tubal ligation   Wound care: Drink plenty of fluids  Ice to area for comfort      Postpartum Vaginal Delivery Instructions    MAKE AN APPOINTMENT TO FOLLOW UP WITH YOUR DOCTOR IN 6 WEEKS, SOONER IF PHYSICAL OR EMOTIONAL CONCERNS.    Activity       Ask family and friends for help when you need it.    Do not place anything in your vagina for 6 weeks.    You are not restricted on other activities, but take it easy for a few weeks to allow your body to recover from delivery.  You are able to do any activities you feel up to that point.    No driving until you have stopped taking your pain medications (usually two weeks after delivery).     Call your health care provider if you have any of these symptoms:       Increased pain, swelling, redness, or fluid around your stiches from an episiotomy or perineal tear.    A fever above 100.4 F (38 C) with or without chills when placing a thermometer under your tongue.    You soak a sanitary pad with blood within 1 hour, or you see blood clots larger than a golf ball.    Bleeding that lasts more than 6 weeks.    Vaginal discharge that smells bad.    Severe pain, cramping or tenderness in your lower belly area.    A need to urinate more frequently (use the toilet more often), more urgently (use the toilet very quickly), or it burns when you urinate.    Nausea and vomiting.    Redness, swelling or pain around a vein in your leg.    Problems breastfeeding or a red or painful area on your breast.    Chest pain and cough or are gasping for air.    Problems coping with sadness, anxiety, or depression.  If you have any concerns about hurting yourself or the baby, call your provider immediately.     You have questions or concerns after you return home.     Keep your hands clean:  Always wash your hands before touching your perineal area and stitches.  This helps  reduce your risk of infection.  If your hands aren't dirty, you may use an alcohol hand-rub to clean your hands. Keep your nails clean and short.

## 2021-10-14 NOTE — PROGRESS NOTES
PPD # 1    S: patient without complaints.  Ambulating, voiding and bleeding slowing.   O: /50   Pulse 58   Temp 97.8  F (36.6  C) (Oral)   Resp 16   LMP 2020 (LMP Unknown)   SpO2 97%   Breastfeeding Unknown    NAD  Abd: soft, nontender, fundus firm  Ext: calves nontender    Hemoglobin   Date Value Ref Range Status   10/13/2021 13.2 11.7 - 15.7 g/dL Final   2021 11.7 11.7 - 15.7 g/dL Final       A/P PPD # 1 s/p     Routine PP care.    Postpartum birth control: reviewed options while breastfeeding.  Patient and spouse are considering sterilization.  Informational pamphlets provided and MA papers signed.     Brigitte Storey M.D.

## 2021-10-14 NOTE — PLAN OF CARE
Data: Vital signs within normal limits. Postpartum checks within normal limits - see flow record. Patient  is tolerating po intake. Patient is able to empty bladder independently. Patient ambulating independently.  No apparent signs of infection. Perineum healing well. Patient is performing self cares and is able to care for infant. Positive attachment behaviors are observed with infant. Support persons are present.  Action:  Pain plan was discussed. Patient will request pain med when she is ready for it. Patient was medicated during the shift for pain and cramping. See MAR. Patient education done about breastfeeding,  cares, postpartum cares, pain management/plan, rest, and discharge from hospital. See flow record.  Response:   Patient reassessed within 1 hour after each medication for pain. Patient stated that pain had improved. Patient stated that she was comfortable.  Plan: Anticipate discharge on 10/14/21.

## 2021-11-24 ENCOUNTER — E-VISIT (OUTPATIENT)
Dept: URGENT CARE | Facility: CLINIC | Age: 31
End: 2021-11-24

## 2021-11-24 DIAGNOSIS — Z20.822 SUSPECTED COVID-19 VIRUS INFECTION: Primary | ICD-10-CM

## 2021-11-24 PROCEDURE — 99421 OL DIG E/M SVC 5-10 MIN: CPT | Performed by: PHYSICIAN ASSISTANT

## 2021-11-24 NOTE — PATIENT INSTRUCTIONS
Dear Denise Cazares,    Your symptoms show that you may have coronavirus (COVID-19). This illness can cause fever, cough and trouble breathing. Many people get a mild case and get better on their own. Some people can get very sick.    Will I be tested for COVID-19?  We would like to test you for Covid-19 virus. I have placed orders for this test.     To schedule: go to your [employer] home page and scroll down to the section that says  You have an appointment that needs to be scheduled  and click the large green button that says  Schedule Now  and follow the steps to find the next available openings.    If you are unable to complete these [employer] scheduling steps, please call [phone] to schedule your testing.     Return to work/school/ guidance:  Please let your workplace manager and staffing office know when your quarantine ends     We can t give you an exact date as it depends on the above. You can calculate this on your own or work with your manager/staffing office to calculate this. (For example if you were exposed on 10/4, you would have to quarantine for 14 full days. That would be through 10/18. You could return on 10/19.)      If you receive a positive COVID-19 test result, follow the guidance of the those who are giving you the results. Usually the return to work is 10 (or in some cases 20 days from symptom onset.) If you work at [employer], you must also be cleared by Employee Occupational Health and Safety to return to work.        If you receive a negative COVID-19 test result and did not have a high risk exposure to someone with a known positive COVID-19 test, you can return to work once you're free of fever for 24 hours without fever-reducing medication and your symptoms are improving or resolved.      If you receive a negative COVID-19 test and If you had a high risk exposure to someone who has tested positive for COVID-19 then you can return to work 14 days after your last contact  with the positive individual    Note: If you have ongoing exposure to the covid positive person, this quarantine period may be more than 14 days. (For example, if you are continued to be exposed to your child who tested positive and cannot isolate from them, then the quarantine of 7-14 days can't start until your child is no longer contagious. This is typically 10 days from onset of the child's symptoms. So the total duration may be 17-24 days in this case.)    Sign up for [employer].   We know it's scary to hear that you might have COVID-19. We want to track your symptoms to make sure you're okay over the next 2 weeks. Please look for an email from [employer]--this is a free, online program that we'll use to keep in touch. To sign up, follow the link in the email you will receive. Learn more at [email or web address]    How can I take care of myself?    Get lots of rest. Drink extra fluids (unless a doctor has told you not to)    Take Tylenol (acetaminophen) or ibuprofen for fever or pain. If you have liver or kidney problems, ask your family doctor if it's okay to take Tylenol o ibuprofen    If you have other health problems (like cancer, heart failure, an organ transplant or severe kidney disease): Call your specialty clinic if you don't feel better in the next 2 days.    Know when to call 911. Emergency warning signs include:  o Trouble breathing or shortness of breath  o Pain or pressure in the chest that doesn't go away  o Feeling confused like you haven't felt before, or not being able to wake up  o Bluish-colored lips or face    Where can I get more information?  [facility] - About COVID-19:   [email or web address]    CDC - What to Do If You're Sick:   www.cdc.gov/coronavirus/2019-ncov/about/steps-when-sick.html    November 24, 2021  RE:  Denise KINNEY Sage                                                                                                                  [id number]  [place] [facility] [address]      To whom it may concern:    I evaluated Denise Cazares on November 24, 2021. Denise Cazares should be excused from work/school.     They should let their workplace manager and staffing office know when their quarantine ends.    We can not give an exact date as it depends on the information below. They can calculate this on their own or work with their manager/staffing office to calculate this. (For example if they were exposed on 10/04, they would have to quarantine for 14 full days. That would be through 10/18. They could return on 10/19.)    Quarantine Guidelines:      If patient receives a positive COVID-19 test result, they should follow the guidance of those who are giving the results. Usually the return to work is 10 (or in some cases 20 days from symptom onset.) If they work at [employer], they must be cleared by Employee Occupational Health and Safety to return to work.        If patient receives a negative COVID-19 test result and did not have a high risk exposure to someone with a known positive COVID-19 test, they can return to work once they're free of fever for 24 hours without fever-reducing medication and their symptoms are improving or resolved.      If patient receives a negative COVID-19 test and if they had a high risk exposure to someone who has tested positive for COVID-19 then they can return to work 14 days after their last contact with the positive individual    Note: If there is ongoing exposure to the covid positive person, this quarantine period may be longer than 14 days. (For example, if they are continually exposed to their child, who tested positive and cannot isolate from them, then the quarantine of 7-14 days can't start until their child is no longer contagious. This is typically 10 days from onset to the child's symptoms. So the total duration may be 17-24 days in this case.)     Sincerely,  Gena Boykin PA-C

## 2021-12-14 ENCOUNTER — PRENATAL OFFICE VISIT (OUTPATIENT)
Dept: OBGYN | Facility: CLINIC | Age: 31
End: 2021-12-14
Payer: COMMERCIAL

## 2021-12-14 ENCOUNTER — MEDICAL CORRESPONDENCE (OUTPATIENT)
Dept: HEALTH INFORMATION MANAGEMENT | Facility: CLINIC | Age: 31
End: 2021-12-14

## 2021-12-14 ENCOUNTER — IMMUNIZATION (OUTPATIENT)
Dept: FAMILY MEDICINE | Facility: CLINIC | Age: 31
End: 2021-12-14
Payer: COMMERCIAL

## 2021-12-14 VITALS
BODY MASS INDEX: 33.45 KG/M2 | TEMPERATURE: 97.2 F | DIASTOLIC BLOOD PRESSURE: 71 MMHG | SYSTOLIC BLOOD PRESSURE: 110 MMHG | HEIGHT: 60 IN | WEIGHT: 170.4 LBS | RESPIRATION RATE: 16 BRPM | HEART RATE: 70 BPM

## 2021-12-14 DIAGNOSIS — E03.9 HYPOTHYROIDISM, UNSPECIFIED TYPE: ICD-10-CM

## 2021-12-14 DIAGNOSIS — Z30.09 BIRTH CONTROL COUNSELING: ICD-10-CM

## 2021-12-14 DIAGNOSIS — Z97.5 NEXPLANON IN PLACE: ICD-10-CM

## 2021-12-14 DIAGNOSIS — N91.2 AMENORRHEA: ICD-10-CM

## 2021-12-14 DIAGNOSIS — Z30.017 INSERTION OF IMPLANTABLE SUBDERMAL CONTRACEPTIVE: ICD-10-CM

## 2021-12-14 PROBLEM — Z34.80 SUPERVISION OF OTHER NORMAL PREGNANCY: Status: RESOLVED | Noted: 2021-10-13 | Resolved: 2021-12-14

## 2021-12-14 PROBLEM — Z34.80 PRENATAL CARE, SUBSEQUENT PREGNANCY: Status: RESOLVED | Noted: 2021-02-24 | Resolved: 2021-12-14

## 2021-12-14 LAB
HCG UR QL: NEGATIVE
INTERNAL QC OK POCT: NORMAL
POCT KIT EXPIRATION DATE: NORMAL
POCT KIT LOT NUMBER: NORMAL
T4 FREE SERPL-MCNC: 1.2 NG/DL (ref 0.76–1.46)
TSH SERPL DL<=0.005 MIU/L-ACNC: 0.1 MU/L (ref 0.4–4)

## 2021-12-14 PROCEDURE — 84439 ASSAY OF FREE THYROXINE: CPT | Performed by: OBSTETRICS & GYNECOLOGY

## 2021-12-14 PROCEDURE — 11981 INSERTION DRUG DLVR IMPLANT: CPT | Performed by: OBSTETRICS & GYNECOLOGY

## 2021-12-14 PROCEDURE — 36415 COLL VENOUS BLD VENIPUNCTURE: CPT | Performed by: OBSTETRICS & GYNECOLOGY

## 2021-12-14 PROCEDURE — 0001A PR COVID VAC PFIZER DIL RECON 30 MCG/0.3 ML IM: CPT

## 2021-12-14 PROCEDURE — 99207 PR POST PARTUM EXAM: CPT | Performed by: OBSTETRICS & GYNECOLOGY

## 2021-12-14 PROCEDURE — 81025 URINE PREGNANCY TEST: CPT | Performed by: OBSTETRICS & GYNECOLOGY

## 2021-12-14 PROCEDURE — 84443 ASSAY THYROID STIM HORMONE: CPT | Performed by: OBSTETRICS & GYNECOLOGY

## 2021-12-14 PROCEDURE — 91300 PR COVID VAC PFIZER DIL RECON 30 MCG/0.3 ML IM: CPT

## 2021-12-14 RX ORDER — PRENATAL VIT/IRON FUM/FOLIC AC 27MG-0.8MG
1 TABLET ORAL DAILY
Qty: 90 TABLET | Refills: 3 | Status: SHIPPED | OUTPATIENT
Start: 2021-12-14 | End: 2023-01-23

## 2021-12-14 RX ORDER — LEVOTHYROXINE SODIUM 112 UG/1
112 TABLET ORAL DAILY
Qty: 60 TABLET | Refills: 0 | Status: SHIPPED | OUTPATIENT
Start: 2021-12-14 | End: 2022-03-09

## 2021-12-14 ASSESSMENT — ANXIETY QUESTIONNAIRES
6. BECOMING EASILY ANNOYED OR IRRITABLE: NOT AT ALL
2. NOT BEING ABLE TO STOP OR CONTROL WORRYING: NOT AT ALL
7. FEELING AFRAID AS IF SOMETHING AWFUL MIGHT HAPPEN: NOT AT ALL
3. WORRYING TOO MUCH ABOUT DIFFERENT THINGS: NOT AT ALL
1. FEELING NERVOUS, ANXIOUS, OR ON EDGE: NOT AT ALL
5. BEING SO RESTLESS THAT IT IS HARD TO SIT STILL: NOT AT ALL
GAD7 TOTAL SCORE: 0

## 2021-12-14 ASSESSMENT — MIFFLIN-ST. JEOR: SCORE: 1401.49

## 2021-12-14 ASSESSMENT — PATIENT HEALTH QUESTIONNAIRE - PHQ9
SUM OF ALL RESPONSES TO PHQ QUESTIONS 1-9: 0
5. POOR APPETITE OR OVEREATING: NOT AT ALL

## 2021-12-14 NOTE — PROGRESS NOTES
"Denise is here for a 6-week postpartum checkup.    She had a  of a liveborn baby girl, weight 7 pounds 14 oz.  The delivery was uncomplicated.  Since delivery, she has been breast feeding.  She has not had a normal menses.  She has had intercourse.  Patient screened for postpartum depression and complaints are NEGATIVE. Screening has also been completed for intimate partner violence. She would like to discuss contraception.    Her last pap was  and was Normal    EXAM: /71 (BP Location: Right arm, Patient Position: Chair, Cuff Size: Adult Regular)   Pulse 70   Temp 97.2  F (36.2  C) (Tympanic)   Resp 16   Ht 1.511 m (4' 11.5\")   Wt 77.3 kg (170 lb 6.4 oz)   LMP 2020 (LMP Unknown)   Breastfeeding Yes   BMI 33.84 kg/m      HEENT: grossly normal.  NECK: no lymphadenopathy or thyromegaly.  ABDOMEN: soft, non tender, good bowel sounds, without masses, rebound, guarding or tenderness.  EXTREMITIES: Warm to touch, no ankle edema or calf tenderness.    PELVIC:   deferred    A/P  Routine Postpartum    1. Contraception: condoms and Nexplanon  2. Annual due     Brigitte Storey M.D.       Nexplanon Insertion:    Is a pregnancy test required: Yes.  Was it positive or negative?  Negative  Was a consent obtained?  Yes    Subjective: Denise Cazares is a 31 year old  presents for Nexplanon.    Patient has been given the opportunity to ask questions about all forms of birth control, including all options appropriate for Denise Cazares. Discussed that no method of birth control, except abstinence is 100% effective against pregnancy or sexually transmitted infection.     Denise Cazares understands she may have the Nexplanon removed at any time and it should be removed by a health care provider.    The entire insertion procedure was reviewed with the patient, including care after placement.    Patient's last menstrual period was 2020 (lmp unknown). Has current contraception. No " "allergy to betadine or shellfish. Patient declines STD screening  HCG Qual Urine   Date Value Ref Range Status   12/14/2021 Negative Negative Final         /71 (BP Location: Right arm, Patient Position: Chair, Cuff Size: Adult Regular)   Pulse 70   Temp 97.2  F (36.2  C) (Tympanic)   Resp 16   Ht 1.511 m (4' 11.5\")   Wt 77.3 kg (170 lb 6.4 oz)   LMP 12/03/2020 (LMP Unknown)   Breastfeeding Yes   BMI 33.84 kg/m      PROCEDURE NOTE: -- Nexplanon Insertion    Reason for Insertion: contraception    Patient was placed supine with left arm exposed.  Alyson was made 8-10 cm above medial epicondyle and a guiding alyson 4 cm above the first.  Arm was prepped with Betadine. Insertion point was anesthetized with 10 mL 1% lidocaine. After stretching the skin with thumb and index finger around the insertion site, skin punctured with the tip of the needle inserted at 30 degrees and then lowered to horizontal position. The needle was then advanced to its full length. Applicator was then stabilized and slider was unlocked. Slider was pulled back until it stopped and then removed.    Correct placement of the implant was confirmed by palpation in the patient's arm and visualizing the purple top of the obturator.   Bandage and pressure dressing applied to insertion site.    LOT# [id number]  Exp: 04/09/2024  NDC# 05337-082-74    EBL: minimal    Complications: none    ASSESSMENT:     ICD-10-CM    1. Postpartum follow-up  Z39.2    2. Birth control counseling  Z30.09 HCG qualitative urine POCT   3. Hypothyroidism, unspecified type  E03.9 TSH     T4, free   4. Amenorrhea  N91.2 Prenatal Vit-Fe Fumarate-FA (PRENATAL MULTIVITAMIN W/IRON) 27-0.8 MG tablet   5. Insertion of implantable subdermal contraceptive  Z30.017 etonogestrel (NEXPLANON) 68 MG IMPL     etonogestrel (NEXPLANON) subdermal implant 68 mg     INSERTION NON-BIODEGRADABLE DRUG DELIVERY IMPLANT   6. Nexplanon in place 12/14/2021 LT Arm  Z97.5     "     PLAN:    Given 's handouts, including when to have Nexplanon removed, list of danger s/sx, side effects and follow up recommended. Encouraged condom use for prevention of STD. Back up contraception advised for 7 days. Advised to call for any fever, for prolonged or severe pain or bleeding, abnormal vaginal dischage. She was advised to use pain medications (ibuprofen) as needed for mild to moderate pain.     Brigitte Storey MD

## 2021-12-14 NOTE — NURSING NOTE
"Initial /71 (BP Location: Right arm, Patient Position: Chair, Cuff Size: Adult Regular)   Pulse 70   Temp 97.2  F (36.2  C) (Tympanic)   Resp 16   Ht 1.511 m (4' 11.5\")   Wt 77.3 kg (170 lb 6.4 oz)   LMP 12/03/2020 (LMP Unknown)   Breastfeeding Yes   BMI 33.84 kg/m   Estimated body mass index is 33.84 kg/m  as calculated from the following:    Height as of this encounter: 1.511 m (4' 11.5\").    Weight as of this encounter: 77.3 kg (170 lb 6.4 oz). .    Dinora Morrison, [facility]    "

## 2021-12-15 ASSESSMENT — ANXIETY QUESTIONNAIRES: GAD7 TOTAL SCORE: 0

## 2022-02-18 ENCOUNTER — MEDICAL CORRESPONDENCE (OUTPATIENT)
Dept: HEALTH INFORMATION MANAGEMENT | Facility: CLINIC | Age: 32
End: 2022-02-18
Payer: COMMERCIAL

## 2022-03-09 ENCOUNTER — MYC MEDICAL ADVICE (OUTPATIENT)
Dept: OBGYN | Facility: CLINIC | Age: 32
End: 2022-03-09

## 2022-03-09 ENCOUNTER — LAB (OUTPATIENT)
Dept: LAB | Facility: CLINIC | Age: 32
End: 2022-03-09
Payer: COMMERCIAL

## 2022-03-09 DIAGNOSIS — E03.9 HYPOTHYROIDISM, UNSPECIFIED TYPE: ICD-10-CM

## 2022-03-09 LAB
T4 FREE SERPL-MCNC: 1.12 NG/DL (ref 0.76–1.46)
TSH SERPL DL<=0.005 MIU/L-ACNC: 0.28 MU/L (ref 0.4–4)

## 2022-03-09 PROCEDURE — 84439 ASSAY OF FREE THYROXINE: CPT

## 2022-03-09 PROCEDURE — 84443 ASSAY THYROID STIM HORMONE: CPT

## 2022-03-09 PROCEDURE — 36415 COLL VENOUS BLD VENIPUNCTURE: CPT

## 2022-03-09 RX ORDER — LEVOTHYROXINE SODIUM 112 UG/1
112 TABLET ORAL DAILY
Qty: 60 TABLET | Refills: 0 | Status: SHIPPED | OUTPATIENT
Start: 2022-03-09 | End: 2022-03-15

## 2022-03-09 NOTE — TELEPHONE ENCOUNTER
Thyroid labs completed 3/9/2022  Patient currently taking 112 mcg daily.   Re-cued for new prescription    Please review labs and advise.     Thank you,   Shannen MOLINA    Specialty Clinics - Flex RN

## 2022-03-11 ENCOUNTER — LACTATION ENCOUNTER (OUTPATIENT)
Age: 32
End: 2022-03-11
Payer: COMMERCIAL

## 2022-03-11 NOTE — LACTATION NOTE
"This note was copied from a baby's chart.  Met with mom, Denise today, she has struggled with drop in milk supply since Ely was admitted last Friday. She used to get 3 bottles each time pumping now getting 1.5 bottles full every 2-3 hours. She  with both of her other children without difficulty, has always had good milk supply when directly breastfeeding. Denise denies pain with pumping, notes nipples rubbing inside of pump tunnel though. She has Hashimoto's thyroiditis managed with Levothyroxine 112 mg daily, takes a prenatal vitamin but has trouble remembering this one daily. Denies any other health concerns or medications.     Delivered cleaning and sanitizing supplies and encouraged daily sanitizing, orient to CDC pump cleaning recommendations. Support during pump session, reviewed hands on techniques and encourage hand expressing after to fully empty. We will try 21 mm pump shields, mom to change back to 24's if no improvement. Although she has 1.5 cm of areolar contact in tunnel, she denies pain and there is no discoloration or skin damage. Discussed nipple care while pumping, ways to increase supply, importance of self care maximizing rest and getting enough to eat and drink, benefits of pump frequency and using hands when increasing supply.  Gave her information about [email or web address] resource and \"Maximizing Milk Production\" video from [employer] to watch online. Offer support and encouragement, answered questions for both mom and dad.     "

## 2022-03-14 ENCOUNTER — LACTATION ENCOUNTER (OUTPATIENT)
Age: 32
End: 2022-03-14
Payer: COMMERCIAL

## 2022-03-14 NOTE — LACTATION NOTE
"This note was copied from a baby's chart.  Follow up visit today with Denise, she reports it has been hard to fit in pumping with Ely being more awake and alert now and needing her more. She has found only able to pump every 4-5 hours still getting 2.5 bottles or so each time. Dad in shower at time of visit, mom planned to pump as soon as he gets out. Discussed options to help such as infant \"swings\" that sit on the floor. Unsure of weight limit but asked her nurse who will look into ordering one up for them so mom can use it and be able to interact with Ely in swing in front of her while pumping. Denise continues to use the 24 mm pump shields and denies pain, feels they were better fit than the 21 mm size. She denies further needs or questions today, will continue to follow while inpatient.   "

## 2022-03-15 ENCOUNTER — LACTATION ENCOUNTER (OUTPATIENT)
Age: 32
End: 2022-03-15
Payer: COMMERCIAL

## 2022-03-15 DIAGNOSIS — E03.9 HYPOTHYROIDISM, UNSPECIFIED TYPE: ICD-10-CM

## 2022-03-15 RX ORDER — LEVOTHYROXINE SODIUM 100 UG/1
100 TABLET ORAL DAILY
Qty: 60 TABLET | Refills: 0 | Status: SHIPPED | OUTPATIENT
Start: 2022-03-15 | End: 2022-05-17 | Stop reason: DRUGHIGH

## 2022-03-15 NOTE — LACTATION NOTE
This note was copied from a baby's chart.  Follow up visit today, both parents in room with Ely. Denise was able to pump more often overnight using mamaRoo swing, is more sore today she feels due to more frequent pumping. They had to use formula once she was not able to keep up with what Ely is taking in.    Reinforce her getting in more pumps yesterday, that they can expect to see results with increased frequency over the next day or two. Denise downloaded an breanne and is using it to track pumping and Ely's diapers and sleep patterns.  Encouraged hands on pumping every 2-3 hours during the day and at least once at night (ideally not more than 4-5 hours between while increasing supply). Caution on suction levels keeping it just below level of discomfort and endorse using her lanolin may be helpful as she adjusts to pumping. Encourage to continue monitor fit, avoiding friction in tunnels that may cause pain or damage. Left our Ascom to use if future questions or concerns and office phone number to leave message on weekends or when we are not here (if Ascom number busy).     Plan to review option of power pumping next visit once mom has gotten into routine of pumping again.

## 2022-03-17 ENCOUNTER — LACTATION ENCOUNTER (OUTPATIENT)
Age: 32
End: 2022-03-17
Payer: COMMERCIAL

## 2022-03-17 NOTE — LACTATION NOTE
This note was copied from a baby's chart.  Denise is using her breanne and pumps as often as she's been able. She noticed improvement pumping more often, had 23 ounces out when pumped 8 times Tuesday, even 24 ounces when pumped just 5 times on Monday. Yesterday 15 ounces out but only pumped 3 times. Brought her a binder to use as hands free bra while here in hospital to help increase her ability to pump more often, she wants to try maybe pumping on one side while holding Ely if necessary. Reviewed benefit of more frequent pumping during the day to try to maintain her supply so when Ely is able to go back to breastfeeding she'll have plenty of milk to continue. Mom shares they're giving her boluses again now instead of continuous flow of 50 mL/hour, feels like she is getting a little too much but they're working on it and working on bottle feeds.  Will continue to follow while inpatient, if able to go back to breastfeeding available for support prn Friday and again on Monday.

## 2022-03-29 ENCOUNTER — LACTATION ENCOUNTER (OUTPATIENT)
Age: 32
End: 2022-03-29
Payer: COMMERCIAL

## 2022-03-29 NOTE — LACTATION NOTE
This note was copied from a baby's chart.  Follow up visit with family today, mom shares Ely is bottle feeding well now using fortifier to increase calories and she's gaining weight more consistently again. They tried breastfeeding Saturday night for comfort when trying to get her to sleep; Ely latched and fed well though it became painful with sucking and they had a hard time getting nipple out of her mouth without waking her up. They've been doing all bottle feeds again since then. Otherwise Denise shares all is going well, denies pain with pumping and is maintaining her supply between 23 to 24 ounces per day.     Discussed different reasons for pain with latching after weeks of bottles and different way of sucking, ways to get more of lower areola in her mouth to help decrease discomfort. Suggest could try putting clean finger in her mouth when break seal after nursing at bedtime, possibly help with transition coming off of breast to sleeping. Encouraged Denise to call for support when breastfeeding one day this week, that we should have someone here Wednesday evening and Friday 5 AM to 1 PM; will assist with deeper latch if possible when bringing her back to breast again. Offer support and encouraged to call Ascom or leave message on office phone prn if any needs arise or want return visit.

## 2022-04-11 ENCOUNTER — LACTATION ENCOUNTER (OUTPATIENT)
Age: 32
End: 2022-04-11
Payer: COMMERCIAL

## 2022-04-11 NOTE — LACTATION NOTE
This note was copied from a baby's chart.  Check in with Denise at Ely's bedside today, she shares breastfeeding is back to normal without pain, she breastfeeds during the night and bottle feeds during the day. It has become easier to ease Ely back to sleep after breaking seal from breast also. No current needs or concerns other than a few breast pads to use until dad can get back home to  more, brought her supplies and encouraged to call if future needs. .

## 2022-04-21 ENCOUNTER — E-VISIT (OUTPATIENT)
Dept: URGENT CARE | Facility: URGENT CARE | Age: 32
End: 2022-04-21
Payer: COMMERCIAL

## 2022-04-21 DIAGNOSIS — H10.33 ACUTE BACTERIAL CONJUNCTIVITIS OF BOTH EYES: Primary | ICD-10-CM

## 2022-04-21 PROCEDURE — 99421 OL DIG E/M SVC 5-10 MIN: CPT | Performed by: PHYSICIAN ASSISTANT

## 2022-04-21 RX ORDER — POLYMYXIN B SULFATE AND TRIMETHOPRIM 1; 10000 MG/ML; [USP'U]/ML
1-2 SOLUTION OPHTHALMIC EVERY 4 HOURS
Qty: 10 ML | Refills: 0 | Status: SHIPPED | OUTPATIENT
Start: 2022-04-21 | End: 2022-04-28

## 2022-04-21 NOTE — PATIENT INSTRUCTIONS
Thank you for choosing us for your care. I have placed an order for a prescription so that you can start treatment. View your full visit summary for details by clicking on the link below. Your pharmacist will able to address any questions you may have about the medication.     If you re not feeling better within 2-3 days, please schedule an appointment.  You can schedule an appointment right here in [facility], or call [phone]  If the visit is for the same symptoms as your eVisit, we ll refund the cost of your eVisit if seen within seven days.    Dear Denise Cazares,    I am unable to evaluate your gluteal pain and pain into your leg via an E-visit.  If you feel this needs evaluation I am going to have to ask you contact your PCP or go to an Urgent care to have this evaluated    Thank you,    Young Nguyen, [facility], PA-C      Bacterial Conjunctivitis    You have an infection in the membranes covering the white part of the eye. This part of the eye is called the conjunctiva. The infection is called conjunctivitis. The most common symptoms of conjunctivitis include a thick, pus-like discharge from the eye, swollen eyelids, redness, eyelids sticking together upon awakening, and a gritty or scratchy feeling in the eye. Your infection was caused by bacteria. It may be treated with medicine. With treatment, the infection takes about 7 to 10 days to resolve.   Home care    Use prescribed antibiotic eye drops or ointment as directed to treat the infection.    Apply a warm compress (towel soaked in warm water) to the affected eye 3 to 4 times a day. Do this just before applying medicine to the eye.    Use a warm, wet cloth to wipe away crusting of the eyelids in the morning. This is caused by mucus drainage during the night. You may also use saline irrigating solution or artificial tears to rinse away mucus in the eye. Do not put a patch over the eye.    Wash your hands before and after touching the infected eye. This  is to prevent spreading the infection to the other eye, and to other people. Don't share your towels or washcloths with others.    You may use acetaminophen or ibuprofen to control pain, unless another medicine was prescribed. Talk with your healthcare provider before using these medicines if you have chronic liver or kidney disease. Also talk with your provider if you have ever had a stomach ulcer or digestive bleeding.    Don't wear contact lenses until your eyes have healed and all symptoms are gone.    Follow-up care  Follow up with your healthcare provider, or as advised.  When to seek medical advice  Call your healthcare provider right away if any of these occur:    Worsening vision    Increasing pain in the eye    Increasing swelling or redness of the eyelid    Redness spreading around the eye  [employer] last reviewed this educational content on 4/1/2020 2000-2021 [employer]. All rights reserved. This information is not intended as a substitute for professional medical care. Always follow your healthcare professional's instructions.

## 2022-05-02 ENCOUNTER — MYC REFILL (OUTPATIENT)
Dept: OBGYN | Facility: CLINIC | Age: 32
End: 2022-05-02
Payer: COMMERCIAL

## 2022-05-02 DIAGNOSIS — E03.9 HYPOTHYROIDISM, UNSPECIFIED TYPE: ICD-10-CM

## 2022-05-02 NOTE — TELEPHONE ENCOUNTER
"Last Written Prescription Date:  3/15/22  Last Fill Quantity: 60,  # refills: 0   Last office visit: Visit date not found with prescribing provider:  12/14/21 [place]   Future Office Visit:  None pending    Requested Prescriptions   Pending Prescriptions Disp Refills     levothyroxine (SYNTHROID/LEVOTHROID) 100 MCG tablet 60 tablet 0     Sig: Take 1 tablet (100 mcg) by mouth daily       Thyroid Protocol Failed - 5/2/2022  8:27 AM        Failed - Normal TSH on file in past 12 months     Recent Labs   Lab Test 03/09/22  1454   TSH 0.28*              Passed - Patient is 12 years or older        Passed - Recent (12 mo) or future (30 days) visit within the authorizing provider's specialty     Patient has had an office visit with the authorizing provider or a provider within the authorizing providers department within the previous 12 mos or has a future within next 30 days. See \"Patient Info\" tab in inbasket, or \"Choose Columns\" in Meds & Orders section of the refill encounter.              Passed - Medication is active on med list        Passed - No active pregnancy on record     If patient is pregnant or has had a positive pregnancy test, please check TSH.          Passed - No positive pregnancy test in past 12 months     If patient is pregnant or has had a positive pregnancy test, please check TSH.           Failed protocol routed to physician.    Kaci Umaznor RN on 5/2/2022 at 12:20 PM                "

## 2022-05-03 ENCOUNTER — APPOINTMENT (OUTPATIENT)
Dept: GENERAL RADIOLOGY | Facility: CLINIC | Age: 32
End: 2022-05-03
Attending: EMERGENCY MEDICINE
Payer: COMMERCIAL

## 2022-05-03 ENCOUNTER — HOSPITAL ENCOUNTER (EMERGENCY)
Facility: CLINIC | Age: 32
Discharge: HOME | End: 2022-05-03
Attending: EMERGENCY MEDICINE | Admitting: EMERGENCY MEDICINE
Payer: COMMERCIAL

## 2022-05-03 VITALS
TEMPERATURE: 98.4 F | WEIGHT: 167 LBS | BODY MASS INDEX: 33.17 KG/M2 | RESPIRATION RATE: 14 BRPM | HEART RATE: 57 BPM | SYSTOLIC BLOOD PRESSURE: 109 MMHG | OXYGEN SATURATION: 98 % | DIASTOLIC BLOOD PRESSURE: 72 MMHG

## 2022-05-03 DIAGNOSIS — M79.672 LEFT FOOT PAIN: ICD-10-CM

## 2022-05-03 PROCEDURE — 73630 X-RAY EXAM OF FOOT: CPT | Mod: LT

## 2022-05-03 PROCEDURE — 250N000013 HC RX MED GY IP 250 OP 250 PS 637: Performed by: EMERGENCY MEDICINE

## 2022-05-03 PROCEDURE — 99284 EMERGENCY DEPT VISIT MOD MDM: CPT | Performed by: EMERGENCY MEDICINE

## 2022-05-03 PROCEDURE — 99283 EMERGENCY DEPT VISIT LOW MDM: CPT | Performed by: EMERGENCY MEDICINE

## 2022-05-03 RX ORDER — IBUPROFEN 600 MG/1
600 TABLET, FILM COATED ORAL ONCE
Status: COMPLETED | OUTPATIENT
Start: 2022-05-03 | End: 2022-05-03

## 2022-05-03 RX ORDER — IBUPROFEN 600 MG/1
600 TABLET, FILM COATED ORAL EVERY 8 HOURS PRN
Qty: 30 TABLET | Refills: 0 | Status: SHIPPED | OUTPATIENT
Start: 2022-05-03 | End: 2022-09-26

## 2022-05-03 RX ORDER — LEVOTHYROXINE SODIUM 100 UG/1
100 TABLET ORAL DAILY
Qty: 60 TABLET | Refills: 0 | Status: CANCELLED | OUTPATIENT
Start: 2022-05-03

## 2022-05-03 RX ADMIN — IBUPROFEN 600 MG: 600 TABLET ORAL at 11:28

## 2022-05-03 ASSESSMENT — ENCOUNTER SYMPTOMS
FEVER: 0
CHILLS: 0

## 2022-05-03 NOTE — TELEPHONE ENCOUNTER
Patient needs repeat bloodwork to see if this dose is appropriate.  If WNL, then ok to refill.      Please inform patient of plan.    Brigitte Storey M.D.

## 2022-05-03 NOTE — ED TRIAGE NOTES
Patient presents with left foot pain/swelling present since this morning. Patient is unable to walk or stand on foot due to 7/10 sharp pain.     Triage Assessment     Row Name 05/03/22 1009       Triage Assessment (Adult)    Airway WDL WDL       Respiratory WDL    Respiratory WDL WDL       Skin Circulation/Temperature WDL    Skin Circulation/Temperature WDL WDL       Cardiac WDL    Cardiac WDL WDL       Peripheral/Neurovascular WDL    Peripheral Neurovascular WDL WDL       Cognitive/Neuro/Behavioral WDL    Cognitive/Neuro/Behavioral WDL WDL

## 2022-05-03 NOTE — ED PROVIDER NOTES
[place] EMERGENCY DEPARTMENT ([facility])  5/03/22  History     Chief Complaint   Patient presents with     Foot Pain     Patient presents with left foot pain/swelling present since this morning. Patient is unable to walk or stand on foot due to 7/10 sharp pain.     The history is provided by the patient and medical records.     Denise Cazares is a 32 year old female with a past medical history significant for hypothyroidism who presents to the Emergency Department for evaluation of left foot pain and swelling. Patient reports she has noticed some mild pain to the left foot for the past 1 week. She reports she woke up this morning with worsening pain and swelling. She reports increased pain with walking and pressure. She states the pain has somewhat improved after putting ice on the foot and after massaging the foot. She states she has not been able to walk today. Patient otherwise denies any fevers or chills. She denies history of gout. Patient also denies chance of pregnancy.      Past Medical History  Past Medical History:   Diagnosis Date     Anemia      Chickenpox      Endometrial polyp      Hypothyroidism      Past Surgical History:   Procedure Laterality Date     AS HYSTEROSCOPY W ENDOMETRIAL BX/POLYPECTOMY W/WO D&C       LAPAROSCOPIC CHOLECYSTECTOMY       TONSILLECTOMY       levothyroxine (SYNTHROID/LEVOTHROID) 100 MCG tablet  Prenatal Vit-Fe Fumarate-FA (PRENATAL MULTIVITAMIN W/IRON) 27-0.8 MG tablet  etonogestrel (NEXPLANON) 68 MG IMPL      No Known Allergies  Family History  Family History   Problem Relation Age of Onset     Diabetes Mother      Hypertension Mother      No Known Problems Father      Other - See Comments Maternal Grandfather         MVA     Social History   Social History     Tobacco Use     Smoking status: Never Smoker     Smokeless tobacco: Never Used   Vaping Use     Vaping Use: Never used   Substance Use Topics     Alcohol use: Not Currently     Comment:  occasionally-quit with pregnnacy     Drug use: Never      Past medical history, past surgical history, medications, allergies, family history, and social history were reviewed with the patient. No additional pertinent items.     I have reviewed the Medications, Allergies, Past Medical and Surgical History, and Social History in the Epic system.    Review of Systems   Constitutional: Negative for chills and fever.   Musculoskeletal:        L-foot pain, worse with pressure   All other systems reviewed and are negative.      Physical Exam   BP: 122/78  Pulse: 68  Temp: 98.4  F (36.9  C)  Resp: 14  Weight: 75.8 kg (167 lb)  SpO2: 97 %      Physical Exam  Vitals and nursing note reviewed.   Musculoskeletal:      Comments: Left foot with some swelling to the dorsal aspect towards the ankle.  Normal movement of the toes.  Normal distal capillary refill.  No open sores or open ulcers agents.  No pain with movement of the ankle.     Physical Exam   Constitutional: oriented to person, place, and time. appears well-developed and well-nourished.   HENT:   Head: Normocephalic and atraumatic.   Neck: Normal range of motion.   Pulmonary/Chest: Effort normal. No respiratory distress.   Neurological: alert and oriented to person, place, and time.   Skin: Skin is warm and dry.   Psychiatric:  normal mood and affect.  behavior is normal. Thought content normal.       ED Course     At 11:02 AM the patient was seen and examined by Joi Kim MD in Room ED03.     Procedures     Results for orders placed or performed during the hospital encounter of 05/03/22   Foot XR, G/E 3 views, left     Status: None    Narrative    XR FOOT LEFT G/E 3 VIEWS 5/3/2022 12:01 PM     HISTORY: foot pain    COMPARISON: None.      Impression    IMPRESSION: No fractures are evident. Normal joint spacing and  alignment. The soft tissues are unremarkable.     REBECCA GIL MD         SYSTEM ID:  [id number]     Medications   ibuprofen  (ADVIL/MOTRIN) tablet 600 mg (600 mg Oral Given 5/3/22 1121)             The medical record was reviewed and interpreted.     No results found for this or any previous visit (from the past 24 hour(s)).  Medications - No data to display       Assessments & Plan (with Medical Decision Making)   32-year-old female who presents to the ER due to ongoing pain in her left foot.  Patient had a bruise on top of her foot but improved after some ibuprofen.  Appear that she has a foot contusion that is likely causing her problems with walking.  No obvious foot fracture.  X-rays negative.  Patient was at home and crutches to use for comfort.  Patient with no signs of infection as the pain and does not get worse with touch and she has full range motion.  Patient stable for discharge.  ---  This part of the medical record was transcribed by Sanchez Leblanc, Medical Scribe, from a dictation done by Joi Kim MD.       I have reviewed the nursing notes.    I have reviewed the findings, diagnosis, plan and need for follow up with the patient.    New Prescriptions    No medications on file       Final diagnoses:   Left foot pain       I, Sanchez Leblanc am serving as a trained medical scribe to document services personally performed by Joi Kim MD, based on the provider's statements to me.      I, Joi Kim MD, was physically present and have reviewed and verified the accuracy of this note documented by Sanchez Leblanc.     Joi Kim MD  5/3/2022   [facility] EMERGENCY DEPARTMENT     Joi Kim MD  05/03/22 2396

## 2022-05-03 NOTE — DISCHARGE INSTRUCTIONS
Your foot xray is normal.     Use ibuprofen to help with the foot pain.     Keep the foot elevated when laying down.

## 2022-05-09 NOTE — PROGRESS NOTES
Assessment & Plan     Foot sprain, left, initial encounter  Atraumatic, no obvious.    Ankle effusion, left  Worse  - Rheumatoid factor; Future  - Anti Nuclear Ladonna IgG by IFA with Reflex; Future  - ESR: Erythrocyte sedimentation rate; Future  - CRP, inflammation; Future  - Uric acid; Future  - Rheumatoid factor  - Anti Nuclear Ladonna IgG by IFA with Reflex  - ESR: Erythrocyte sedimentation rate  - CRP, inflammation  - Uric acid    Effusion of left knee  Small    Acute left-sided low back pain with left-sided sciatica  Ongoing since pregnant    Hypothyroidism, unspecified type  Longstanding  - TSH with free T4 reflex    Review of external notes as documented elsewhere in note  Ordering of each unique test  Prescription drug management  40 minutes spent on the date of the encounter doing chart review, history and exam, documentation and further activities per the note     BMI:   Estimated body mass index is 33.59 kg/m  as calculated from the following:    Height as of this encounter: 1.524 m (5').    Weight as of this encounter: 78 kg (172 lb).   Weight management plan: Discussed healthy diet and exercise guidelines    Patient Instructions   Go to sports medicine same-day access to obtain assistance with left foot sprain pain and swelling.  May need to go back to crutch walking.    If not improving may need advanced imaging as x-rays were negative last week.    We will contact you with regard to lab results.  Otherwise recommend follow-up with sports medicine regarding left sciatica, small left knee effusion, left ankle effusion.      No follow-ups on file.    Biju Campbell MD  [facility]    Shirley Walker is a 32 year old who presents for the following health issues  accompanied by her spouse.    [facility]       Hospital Follow-up Visit:    Hospital/Nursing Home/ Rehab Facility: [facility]   Date of Admission: 05/03/2022  Date of Discharge: 05/03/2022  Reason(s) for  Admission: Foot pain       Was your hospitalization related to COVID-19? No   Problems taking medications regularly:  None  Medication changes since discharge: None  Problems adhering to non-medication therapy:  None    Summary of hospitalization:  [facility] discharge summary reviewed  Diagnostic Tests/Treatments reviewed.  Follow up needed: none  Other Healthcare Providers Involved in Patient s Care:         None  Update since discharge: worsened. Post Discharge Medication Reconciliation: discharge medications reconciled, continue medications without change.  Plan of care communicated with patient and family     Patient describes similar problem with a right knee effusion about a year ago.  She had it tapped in [place] and it got better.  No explanation.  She has known hypermobile joints, but not diagnosed by provider.  1 cousin has double-jointed thumbs.  No connective tissue disease in first-degree relatives.  She has not had constitutional symptoms herself.  The left sciatica is getting better with chiropractic care.  She was given crutches to use by the ER but it is difficult to use and she cannot care for her 7-month-old with them.  She is using a postoperative stiff soled shoe but not adequate    Review of Systems   Constitutional, HEENT, cardiovascular, pulmonary, GI, , musculoskeletal, neuro, skin, endocrine and psych systems are negative, except as otherwise noted.      Objective    /74   Pulse 71   Temp 97.1  F (36.2  C) (Temporal)   Ht 1.524 m (5')   Wt 78 kg (172 lb)   SpO2 98%   BMI 33.59 kg/m    Body mass index is 33.59 kg/m .  Physical Exam   GENERAL: healthy, alert and no distress  MS: 1 edema to knees, LLE exam shows small knee effusion, large ankle effusion and tenderness over the left trifurcate ligament with slight redness and warmth.  Nontender over malleoli and base of the fifth metatarsal.  Drawer sign negative at ankle and knee clearly hypermobile knee joints  and patellae.  SKIN: no suspicious lesions or rashes

## 2022-05-11 ENCOUNTER — OFFICE VISIT (OUTPATIENT)
Dept: FAMILY MEDICINE | Facility: CLINIC | Age: 32
End: 2022-05-11
Payer: COMMERCIAL

## 2022-05-11 ENCOUNTER — OFFICE VISIT (OUTPATIENT)
Dept: ORTHOPEDICS | Facility: CLINIC | Age: 32
End: 2022-05-11
Payer: COMMERCIAL

## 2022-05-11 ENCOUNTER — THERAPY VISIT (OUTPATIENT)
Dept: PHYSICAL THERAPY | Facility: CLINIC | Age: 32
End: 2022-05-11
Attending: FAMILY MEDICINE
Payer: COMMERCIAL

## 2022-05-11 VITALS
HEIGHT: 60 IN | WEIGHT: 172 LBS | TEMPERATURE: 97.1 F | BODY MASS INDEX: 33.77 KG/M2 | SYSTOLIC BLOOD PRESSURE: 114 MMHG | DIASTOLIC BLOOD PRESSURE: 74 MMHG | OXYGEN SATURATION: 98 % | HEART RATE: 71 BPM

## 2022-05-11 DIAGNOSIS — M25.562 CHRONIC PAIN OF LEFT KNEE: ICD-10-CM

## 2022-05-11 DIAGNOSIS — M25.511 CHRONIC RIGHT SHOULDER PAIN: ICD-10-CM

## 2022-05-11 DIAGNOSIS — M79.672 CHRONIC FOOT PAIN, LEFT: ICD-10-CM

## 2022-05-11 DIAGNOSIS — M54.42 ACUTE LEFT-SIDED LOW BACK PAIN WITH LEFT-SIDED SCIATICA: ICD-10-CM

## 2022-05-11 DIAGNOSIS — M25.462 EFFUSION OF LEFT KNEE: ICD-10-CM

## 2022-05-11 DIAGNOSIS — G89.29 CHRONIC FOOT PAIN, LEFT: ICD-10-CM

## 2022-05-11 DIAGNOSIS — G89.29 CHRONIC RIGHT SHOULDER PAIN: ICD-10-CM

## 2022-05-11 DIAGNOSIS — M24.9 HYPERMOBILITY OF JOINT: Primary | ICD-10-CM

## 2022-05-11 DIAGNOSIS — S93.602A FOOT SPRAIN, LEFT, INITIAL ENCOUNTER: Primary | ICD-10-CM

## 2022-05-11 DIAGNOSIS — G89.29 CHRONIC PAIN OF LEFT KNEE: ICD-10-CM

## 2022-05-11 DIAGNOSIS — E03.9 HYPOTHYROIDISM, UNSPECIFIED TYPE: ICD-10-CM

## 2022-05-11 DIAGNOSIS — M25.472 ANKLE EFFUSION, LEFT: ICD-10-CM

## 2022-05-11 DIAGNOSIS — M24.9 HYPERMOBILITY OF JOINT: ICD-10-CM

## 2022-05-11 LAB
CRP SERPL-MCNC: 20.2 MG/L (ref 0–8)
ERYTHROCYTE [SEDIMENTATION RATE] IN BLOOD BY WESTERGREN METHOD: 40 MM/HR (ref 0–20)
T4 FREE SERPL-MCNC: 0.8 NG/DL (ref 0.76–1.46)
TSH SERPL DL<=0.005 MIU/L-ACNC: 12.39 MU/L (ref 0.4–4)
URATE SERPL-MCNC: 2.1 MG/DL (ref 2.6–6)

## 2022-05-11 PROCEDURE — 97112 NEUROMUSCULAR REEDUCATION: CPT | Mod: GP | Performed by: PHYSICAL THERAPIST

## 2022-05-11 PROCEDURE — 36415 COLL VENOUS BLD VENIPUNCTURE: CPT | Performed by: FAMILY MEDICINE

## 2022-05-11 PROCEDURE — 99203 OFFICE O/P NEW LOW 30 MIN: CPT | Performed by: FAMILY MEDICINE

## 2022-05-11 PROCEDURE — 86038 ANTINUCLEAR ANTIBODIES: CPT | Performed by: FAMILY MEDICINE

## 2022-05-11 PROCEDURE — 84550 ASSAY OF BLOOD/URIC ACID: CPT | Performed by: FAMILY MEDICINE

## 2022-05-11 PROCEDURE — 84443 ASSAY THYROID STIM HORMONE: CPT | Performed by: FAMILY MEDICINE

## 2022-05-11 PROCEDURE — 97161 PT EVAL LOW COMPLEX 20 MIN: CPT | Mod: GP | Performed by: PHYSICAL THERAPIST

## 2022-05-11 PROCEDURE — 86431 RHEUMATOID FACTOR QUANT: CPT | Performed by: FAMILY MEDICINE

## 2022-05-11 PROCEDURE — 85652 RBC SED RATE AUTOMATED: CPT | Performed by: FAMILY MEDICINE

## 2022-05-11 PROCEDURE — 84439 ASSAY OF FREE THYROXINE: CPT | Performed by: FAMILY MEDICINE

## 2022-05-11 PROCEDURE — 86140 C-REACTIVE PROTEIN: CPT | Performed by: FAMILY MEDICINE

## 2022-05-11 PROCEDURE — 99215 OFFICE O/P EST HI 40 MIN: CPT | Performed by: FAMILY MEDICINE

## 2022-05-11 ASSESSMENT — ACTIVITIES OF DAILY LIVING (ADL)
STIFFNESS: I DO NOT HAVE THE SYMPTOM
RAW_SCORE: 24
RISE FROM A CHAIR: ACTIVITY IS FAIRLY DIFFICULT
GIVING WAY, BUCKLING OR SHIFTING OF KNEE: I DO NOT HAVE THE SYMPTOM
GO UP STAIRS: ACTIVITY IS VERY DIFFICULT
SIT WITH YOUR KNEE BENT: ACTIVITY IS FAIRLY DIFFICULT
KNEE_ACTIVITY_OF_DAILY_LIVING_SCORE: 34.29
WEAKNESS: I DO NOT HAVE THE SYMPTOM
AS_A_RESULT_OF_YOUR_KNEE_INJURY,_HOW_WOULD_YOU_RATE_YOUR_CURRENT_LEVEL_OF_DAILY_ACTIVITY?: SEVERELY ABNORMAL
SWELLING: THE SYMPTOM PREVENTS ME FROM ALL DAILY ACTIVITIES
HOW_WOULD_YOU_RATE_THE_CURRENT_FUNCTION_OF_YOUR_KNEE_DURING_YOUR_USUAL_DAILY_ACTIVITIES_ON_A_SCALE_FROM_0_TO_100_WITH_100_BEING_YOUR_LEVEL_OF_KNEE_FUNCTION_PRIOR_TO_YOUR_INJURY_AND_0_BEING_THE_INABILITY_TO_PERFORM_ANY_OF_YOUR_USUAL_DAILY_ACTIVITIES?: 50
KNEEL ON THE FRONT OF YOUR KNEE: I AM UNABLE TO DO THE ACTIVITY
HIGHEST_POTENTIAL_ADL_SCORE:: 64
LIMPING: THE SYMPTOM PREVENTS ME FROM ALL DAILY ACTIVITIES
PAIN: THE SYMPTOM AFFECTS MY ACTIVITY SEVERELY
KNEE_ACTIVITY_OF_DAILY_LIVING_SUM: 24
STAND: ACTIVITY IS VERY DIFFICULT
SQUAT: I AM UNABLE TO DO THE ACTIVITY
GO DOWN STAIRS: ACTIVITY IS VERY DIFFICULT
WALK: ACTIVITY IS VERY DIFFICULT
TOTAL_ADL_ITEM_SCORE:: 26
HOW_WOULD_YOU_RATE_THE_OVERALL_FUNCTION_OF_YOUR_KNEE_DURING_YOUR_USUAL_DAILY_ACTIVITIES?: SEVERELY ABNORMAL

## 2022-05-11 NOTE — RESULT ENCOUNTER NOTE
Tray Walker: Your recent results show low uric acid (no gout), with elevated sed rate, c-reactive protein both indicating some time of inflammation, with RF and BAYLEE pending. Please check with Dr Collins if you're a candidate for Medrol dose pack.    Biju Campbell MD

## 2022-05-11 NOTE — PROGRESS NOTES
Subjective:   Denise Cazares is a 32 year old female who presents in clinic with left foot pain and moderate amount of swelling. She reports her foot started to swell during the night of 5/2/2022. She does of a history of sciatica. She has a 7 month old daughter and due to pain with walking, she is having difficulty caring for her.   Went to ER, foot bruising, using brace and crutches  Icing and elevation  Right ankle, toes, into knee  Sciatica symptoms recovered  Right knee aspiration last year  Clicking in knees with bending, no joint pains, no fever, no viral illness, no abdominal pains  Taking ibuprofen for pain, able to move  7 mo old- [facility] since March, so not doing much  Walking around the room  Never had a blood clot  No Family hx- mom- osteoporosis, hyperlipidemia, HTN, DM  She reports that her left knee is swelling over the last two days with no acute injury.   Right shoulder pain, can't sleep on right shoulder- didn't have an accident  Picking up babies and arm has the strength, wakes up with right shoulder pain    Background:   Date of injury: 5/2/2022   Duration of symptoms: 9 days  Mechanism of Injury: Insidious Onset; Unknown  Intensity: 2/10 at rest, 7/10 with activity   Aggravating factors: weight-bearing activities.   Relieving Factors: rest, ice, NSAIDs, acetaminophen, elevation, knee brace, splinting and compression.   Prior Evaluation: Seen in ED on 5/3/2022  Occupation: stay at home mom.     PAST MEDICAL, SOCIAL, SURGICAL AND FAMILY HISTORY: She  has a past medical history of Anemia, Chickenpox, Endometrial polyp, and Hypothyroidism.  She  has a past surgical history that includes Tonsillectomy; Laparoscopic cholecystectomy; and HYSTEROSCOPY W ENDOMETRIAL BX/POLYPECTOMY W/WO D&C.  Her family history includes Diabetes in her mother; Hypertension in her mother; No Known Problems in her father; Other - See Comments in her maternal grandfather.  She reports that she has  never smoked. She has never used smokeless tobacco. She reports previous alcohol use. She reports that she does not use drugs.    ALLERGIES: She has No Known Allergies.    CURRENT MEDICATIONS: She has a current medication list which includes the following prescription(s): etonogestrel, ibuprofen, levothyroxine, and prenatal multivitamin w/iron.     REVIEW OF SYSTEMS: 10 point review of systems is negative except as noted above.     Exam:   There were no vitals taken for this visit.           CONSTITUTIONAL: alert, moderate distress, cooperative and obese  HEAD: Normocephalic. No masses, lesions, tenderness or abnormalities  SKIN: no suspicious lesions or rashes  GAIT: antalgic, valgus of knees  NEUROLOGIC: Non-focal  PSYCHIATRIC: affect normal/bright and mentation appears normal.    MUSCULOSKELETAL: right shoulder, left knee, left foot    Palpation:  Non-tender SC joint, clavicle, AC joint, acromion, subacromial space, proximal bicep tendon and upper trapezius muscle   Tender: lateral shoulder  Range of Motion        Active:all normal        Passive: all normal  Strength: rotator cuff strength intact- concern for supraspinatus  Special tests: Negative Neer's test, Negative Hernandez, Negative Cross-Arm adduction,  Equivical Lyn's        Inspection:       AP/lateral alignment normal      Non-tender: patellar facets, MCL, LCL, lateral joint line, medial joint line, IT band, posterior knee   Active Range of Motion: all normal  Strength: quad  5-/5, Hamstrings  5-/5  Special tests: normal Valgus stress test, normal Varus, negative Lachman's test, negative Wilmer's, no apprehension with lateral stress of the patella.       Assessment/Plan:   Pt is a 31 yo white female with PMhx of hypothyroidism, hypermobility presenting with left knee and left ankle pain and right shoulder pain    1. Right shoulder pain- supraspinatus tendonitis  Hypermobility  RC tendonitis- will send to PT    2. Left knee and ankle pain-  swelling  Inflammatory labs were obtained by PCP  No evidence of bony abnormalities on x-ray  Compression stockings  Elevate as able  No evidence of erythema or calf tenderness to suggest DVT    3. Hypothyroidism-PCP adjusted meds, possible Endo    RTC 6 weeks  X-RAY INTERPRETATION:   Left foot x-ray- normal

## 2022-05-11 NOTE — PATIENT INSTRUCTIONS
Go to sports medicine same-day access to obtain assistance with left foot sprain pain and swelling.  May need to go back to crutch walking.    If not improving may need advanced imaging as x-rays were negative last week.    We will contact you with regard to lab results.  Otherwise recommend follow-up with sports medicine regarding left sciatica, small left knee effusion, left ankle effusion.

## 2022-05-11 NOTE — PROGRESS NOTES
Physical Therapy Initial Evaluation  Subjective:  The history is provided by the patient. No  was used.   Therapist Generated HPI Evaluation  Problem details: Left Knee: May 2022,  leftFoot: April 2022  Pain: knee: 7-8/10, foot: 8/10   .         Type of problem:  Left knee.    This is a new (leftt knee and left foot) condition.  Occurance: knee: insidious, unknown reasons Foot: insidious, unknown reasons.    Site of Pain: Left Knee: Anterior, left foot: dorsum/lateral.  Pain quality: Left Knee: aching, foot: sharp. Pain frequency: left foot: constant, Left knee: intermittent.  Radiates to: none. Pain timing: knee: morning, foot: morning.  Progression since onset: Knee: worse, Foot: worse.  Associated with: Knee: loss of strength, foot: unstable. Exacerbated by: knee: bending ( bathroom), foot: walking/standing.  and relieved by ice (medication, ice for knee and foot).  Imaging testing: Knee: none.  Past treatment: none. Improved with treatment: na.  Work activity restrictions: not working   Home/work barriers: na since patient staying at the hospital with 7 month old     Xray: IMPRESSION: Foot No fractures are evident. Normal joint spacing and  alignment. The soft tissues are unremarkable.                     Objective:  Standing Alignment:              Knee deviations alignment: KNEE VALGUS, PES PLANUS (b)                 Ankle/Foot Evaluation  ROM:    AROM:    Dorsiflexion:  Left:   WNL  Right:   WNL  Plantarflexion:  Left:  WNL    Right:  WNL  Inversion:  Left:  WNL     Right:  WNL  Eversion:  WNL     Right:  WNL        Strength:                Anterior Tibialis:Left: 5-/5  Pain:  Right: 5/5  Pain:  Posterior Tibialis: Left: 5-/5  Pain:  Right: 5/5  Pain:  Peroneals: Left: 5-/5  Pain:  Right: 5/5  Pain:        LIGAMENT TESTING: not assessed              SPECIAL TESTS: not assessed                                                      Knee Evaluation:  ROM:    AROM      Extension:  Left: 0     Right:  0  Flexion: Left: 115 with knee pain     Right: WNL  PROM    Hyperextension: Left: 1    Right:  5          Strength:     Extension:  Left: 5-/5   Pain:      Right: 5/5   Pain:  Flexion:  Left: 4+/5   Pain:      Right: 4+ and 5-/5   Pain:    Quad Set Left: Fair    Pain:   Quad Set Right: Good    Pain:  Ligament Testing:  Not Assessed                Special Tests: Not Assessed      Palpation:  Normal      Edema:  Edema of the knee: left medial and lateral knee (L) mild.    Mobility Testing:    Proximal Tib-Fib:  Left: hypermobile    Right: hypermobile  Patellofemoral Medial:  Left: hypermobile    Right: hypermobile  Patellofemoral Lateral:  Left: hypermobile    Right: hypermobile  Patellofemoral Superior:  Left: hypermobile    Right: hypermobile  Patellofemoral Inferior:  Left: hypermobile    Right: hypermobile      Left superior pubic symphysis, forward bend test positive in standing, pereonus tertius insertional tenderness, increase (B) mid tarsal mobility, mild to moderate swelling noted lateral dorsum foot, mmt: gluteus medius: 3+/5 (B) GS length: 0 degrees (B), single leg balance: 30 second R and 3 seconds left     General     ROS    Assessment/Plan:    Patient is a 32 year old female with left side knee and left foot complaints.    Patient has the following significant findings with corresponding treatment plan.                Diagnosis 1:  Left knee pain   Pain -  hot/cold therapy, manual therapy, self management, education, directional preference exercise and home program  Decreased ROM/flexibility - manual therapy, therapeutic exercise, therapeutic activity and home program  Decreased strength - therapeutic exercise, therapeutic activities and home program  Impaired muscle performance - neuro re-education and home program  Decreased function - therapeutic activities and home program    Therapy Evaluation Codes:     1) Clinical presentation characteristics  are:   Stable/Uncomplicated.  2) Decision-Making    Low complexity using standardized patient assessment instrument and/or measureable assessment of functional outcome.  Cumulative Therapy Evaluation is: Low complexity.    Previous and current functional limitations:  (See Goal Flow Sheet for this information)    Short term and Long term goals: (See Goal Flow Sheet for this information)     Communication ability:  Patient appears to be able to clearly communicate and understand verbal and written communication and follow directions correctly.  Treatment Explanation - The following has been discussed with the patient:   RX ordered/plan of care  Anticipated outcomes  Possible risks and side effects  This patient would benefit from PT intervention to resume normal activities.   Rehab potential is good.    Frequency:  1 X week, once daily  Duration:  for 6 weeks  Discharge Plan:  Achieve all LTG.  Independent in home treatment program.  Reach maximal therapeutic benefit.    Please refer to the daily flowsheet for treatment today, total treatment time and time spent performing 1:1 timed codes.

## 2022-05-11 NOTE — PROGRESS NOTES
[facility]    OUTPATIENT Physical Therapy ORTHOPEDIC EVALUATION  PLAN OF TREATMENT FOR OUTPATIENT REHABILITATION  (COMPLETE FOR INITIAL CLAIMS ONLY)  Patient's Last Name, First Name, M.I.  YOB: 1990  Denise Cazares    Provider s Name:  [facility]   Medical Record No.  [id number]   Start of Care Date:  05/11/22   Onset Date:   05/01/22 (left foot: May 1, 2022)   Type:     _X__PT   ___OT Medical Diagnosis:    Encounter Diagnoses   Name Primary?    Hypermobility of joint     Chronic pain of left knee     Chronic foot pain, left     Chronic right shoulder pain         Treatment Diagnosis:  left knee pain        Goals:     05/11/22 0500   Goal #1   Goal #1 self cares/transfers/bed mobility  (Knee)   Previous Functional Level   (no pain with car transfer)   Current Functional Level   (getting in car with pain level 5-6/10)   STG [employer]   (getting in car with pain level 4-5/10)   Rationale   (pain free transfer)   Due Date 06/15/22   LTG [employer]   (getting in car with pain level 3/10)   Rationale   (pain free transfer)   Due Date 07/20/22   Goal #2   Goal #2 standing   Performance level pain free   Current Functional Level   (10 min with 7/10 pain level)   STG [employer]   (10 min with 6/10 pain level)   Rationale for safe community ambulation  (pain free standing with adls)   Due date 06/30/22   LTG [employer]   (10 min with 6/10 pain level)   Rationale for safe community ambulation   Due date 07/30/22       Therapy Frequency:  1 time per week  Predicted Duration of Therapy Intervention:  6 weeks    Drea Gonzalez, PT                 I CERTIFY THE NEED FOR THESE SERVICES FURNISHED UNDER        THIS PLAN OF TREATMENT AND WHILE UNDER MY CARE     (Physician attestation of this document indicates review and certification of the  therapy plan).                     Certification Date From:  05/11/22   Certification Date To:  07/28/22    Referring Provider:  Janet Schmitt*    Initial Assessment        See Epic Evaluation SOC Date: 05/11/22

## 2022-05-11 NOTE — LETTER
Date:May 13, 2022      Patient was self referred, no letter generated. Do not send.        [facility] Health Information

## 2022-05-11 NOTE — LETTER
5/11/2022      RE: Denise Cazares  [address] [place] [address]     Dear Colleague,    Thank you for referring your patient, Denise Cazares, to the [facility] [place]. Please see a copy of my visit note below.     Subjective:   Denise Cazares is a 32 year old female who presents in clinic with left foot pain and moderate amount of swelling. She reports her foot started to swell during the night of 5/2/2022. She does of a history of sciatica. She has a 7 month old daughter and due to pain with walking, she is having difficulty caring for her.   Went to ER, foot bruising, using brace and crutches  Icing and elevation  Right ankle, toes, into knee  Sciatica symptoms recovered  Right knee aspiration last year  Clicking in knees with bending, no joint pains, no fever, no viral illness, no abdominal pains  Taking ibuprofen for pain, able to move  7 mo old- [employer] since March, so not doing much  Walking around the room  Never had a blood clot  No Family hx- mom- osteoporosis, hyperlipidemia, HTN, DM  She reports that her left knee is swelling over the last two days with no acute injury.   Right shoulder pain, can't sleep on right shoulder- didn't have an accident  Picking up babies and arm has the strength, wakes up with right shoulder pain    Background:   Date of injury: 5/2/2022   Duration of symptoms: 9 days  Mechanism of Injury: Insidious Onset; Unknown  Intensity: 2/10 at rest, 7/10 with activity   Aggravating factors: weight-bearing activities.   Relieving Factors: rest, ice, NSAIDs, acetaminophen, elevation, knee brace, splinting and compression.   Prior Evaluation: Seen in ED on 5/3/2022  Occupation: stay at home mom.     PAST MEDICAL, SOCIAL, SURGICAL AND FAMILY HISTORY: She  has a past medical history of Anemia, Chickenpox, Endometrial polyp, and Hypothyroidism.  She  has a past surgical history that includes Tonsillectomy; Laparoscopic  cholecystectomy; and HYSTEROSCOPY W ENDOMETRIAL BX/POLYPECTOMY W/WO D&C.  Her family history includes Diabetes in her mother; Hypertension in her mother; No Known Problems in her father; Other - See Comments in her maternal grandfather.  She reports that she has never smoked. She has never used smokeless tobacco. She reports previous alcohol use. She reports that she does not use drugs.    ALLERGIES: She has No Known Allergies.    CURRENT MEDICATIONS: She has a current medication list which includes the following prescription(s): etonogestrel, ibuprofen, levothyroxine, and prenatal multivitamin w/iron.     REVIEW OF SYSTEMS: 10 point review of systems is negative except as noted above.     Exam:   There were no vitals taken for this visit.           CONSTITUTIONAL: alert, moderate distress, cooperative and obese  HEAD: Normocephalic. No masses, lesions, tenderness or abnormalities  SKIN: no suspicious lesions or rashes  GAIT: antalgic, valgus of knees  NEUROLOGIC: Non-focal  PSYCHIATRIC: affect normal/bright and mentation appears normal.    MUSCULOSKELETAL: right shoulder, left knee, left foot    Palpation:  Non-tender SC joint, clavicle, AC joint, acromion, subacromial space, proximal bicep tendon and upper trapezius muscle   Tender: lateral shoulder  Range of Motion        Active:all normal        Passive: all normal  Strength: rotator cuff strength intact- concern for supraspinatus  Special tests: Negative Neer's test, Negative Hernandez, Negative Cross-Arm adduction,  Equivical Lyn's        Inspection:       AP/lateral alignment normal      Non-tender: patellar facets, MCL, LCL, lateral joint line, medial joint line, IT band, posterior knee   Active Range of Motion: all normal  Strength: quad  5-/5, Hamstrings  5-/5  Special tests: normal Valgus stress test, normal Varus, negative Lachman's test, negative Wilmer's, no apprehension with lateral stress of the patella.       Assessment/Plan:   Pt is a 33 yo  white female with PMhx of hypothyroidism, hypermobility presenting with left knee and left ankle pain and right shoulder pain    1. Right shoulder pain- supraspinatus tendonitis  Hypermobility  RC tendonitis- will send to PT    2. Left knee and ankle pain- swelling  Inflammatory labs were obtained by PCP  No evidence of bony abnormalities on x-ray  Compression stockings  Elevate as able  No evidence of erythema or calf tenderness to suggest DVT    3. Hypothyroidism-PCP adjusted meds, possible Endo    RTC 6 weeks  X-RAY INTERPRETATION:   Left foot x-ray- normal      Again, thank you for allowing me to participate in the care of your patient.      Sincerely,    Janet Collins MD

## 2022-05-12 DIAGNOSIS — E03.9 HYPOTHYROIDISM: Primary | ICD-10-CM

## 2022-05-12 LAB
ANA SER QL IF: NEGATIVE
RHEUMATOID FACT SER NEPH-ACNC: 7 IU/ML

## 2022-05-12 RX ORDER — LEVOTHYROXINE SODIUM 112 UG/1
112 TABLET ORAL DAILY
Qty: 60 TABLET | Refills: 0 | Status: SHIPPED | OUTPATIENT
Start: 2022-05-12 | End: 2022-06-24

## 2022-05-12 NOTE — RESULT ENCOUNTER NOTE
Pt notified of below.  Pt reports understanding.  Pt does not have further questions or concerns.  Patient has been taking medication consistently.  Patient has been taking 100 mcg, will increase to 112 mcg.  Patient would like referral to Endocrinology so will generate today.    Nicole Brumfield   Ob/Gyn Clinic  RN

## 2022-05-12 NOTE — PROGRESS NOTES
Physical Therapy Initial Evaluation  Subjective:    Patient Health History  Denise Cazares being seen for Foot & Knee.     Problem began: 5/2/2022.   Problem occurred: took a wrong step   Pain is reported as 6/10 on pain scale.  General health as reported by patient is good.  Pertinent medical history includes: anemia, overweight and thyroid problems.        Surgeries include:  Other. Other surgery history details: Tonsillectomy, gall bladder removal.    Current medications:  Thyroid medication.                                           Objective:  System    Physical Exam    General     ROS    Assessment/Plan:

## 2022-05-12 NOTE — RESULT ENCOUNTER NOTE
Call to pt to notify of below.  Unable to reach.  Left message for pt to call back     Nicole Brumfield   Ob/Gyn Clinic  RN

## 2022-05-16 ENCOUNTER — OFFICE VISIT (OUTPATIENT)
Dept: ORTHOPEDICS | Facility: CLINIC | Age: 32
End: 2022-05-16
Payer: COMMERCIAL

## 2022-05-16 VITALS
DIASTOLIC BLOOD PRESSURE: 72 MMHG | WEIGHT: 167.9 LBS | HEIGHT: 60 IN | BODY MASS INDEX: 32.96 KG/M2 | SYSTOLIC BLOOD PRESSURE: 101 MMHG

## 2022-05-16 DIAGNOSIS — M25.462 EFFUSION, LEFT KNEE: ICD-10-CM

## 2022-05-16 DIAGNOSIS — M25.562 ACUTE PAIN OF LEFT KNEE: Primary | ICD-10-CM

## 2022-05-16 PROCEDURE — 99214 OFFICE O/P EST MOD 30 MIN: CPT | Mod: 25 | Performed by: ORTHOPAEDIC SURGERY

## 2022-05-16 PROCEDURE — 20610 DRAIN/INJ JOINT/BURSA W/O US: CPT | Mod: LT | Performed by: ORTHOPAEDIC SURGERY

## 2022-05-16 ASSESSMENT — PAIN SCALES - GENERAL: PAINLEVEL: SEVERE PAIN (7)

## 2022-05-16 NOTE — PROGRESS NOTES
"CHIEF COMPLAINT:   Chief Complaint   Patient presents with     Left Knee - Pain     Onset: 2 weeks. She had sciatica and so she could not step right and thinks that maybe that is what caused her knee pain. When she walks her knee will buckle. She notes she has swelling in her knee. Pain is over the whole knee. Pain is constant. She did one session of physical therapy.    .        HISTORY OF PRESENT ILLNESS    Denise Cazares is a 32 year old female seen for evaluation of ongoing left knee pain with no known injury.   Pain has been present for 2 weeks. She was dealing with sciatica, so her walking was off and thinks that may have causes her left knee to hurt. She feels like the knee \"alexandru\" with walking. She's had some swelling in the knee. Locates pain throughout the knee now, started along the inner aspect. Pain is constant, worse with walking.  Treatment with elevation, ice, compression sleeve but was too small and caused more swelling in the toes. Difficulties bending. Was taking ibuprofen which helped, but then would be more active during the day as it felt better but would have more pain/swelling end of day.  Pain at night. Uncomfortable at rest.      Had some numbness and tingling in toes when wearing knee brace/sleeve, thinks was too tight.    She did Physical Therapy x1 session.    Has had left foot pain, right shoulder pain as well.      She's had some inflammatory labs which were elevated, Crp 20.2, ESR 40.    She was seen a year ago (2/2021) for right knee pain, swelling. Had an aspiration only at that time, symptoms improved.    Was told has hypermobile joints.    Breastfeeding 7month old child whom currently being treated for Leukemia at [facility] with chemotherapy.    Present symptoms: pain diffuse, pain dull/achy , moderate pain, moderate swelling.    Pain severity: 7/10  Frequency of symptoms: are constant  Exacerbating Factors: weight bearing, stairs, " [id number], prolonged standing  Relieving Factors: elevation  Night Pain: Yes  Pain while at rest: Yes   Numbness or tingling: occasional   Patient has tried:     NSAIDS: Yes      Physical Therapy: Yes      Activity modification: Yes      Bracing: Yes      Injections: No      Ice: Yes      Assistive device:  No    Other PMH:  has a past medical history of Anemia, Chickenpox, Endometrial polyp, and Hypothyroidism.  Patient Active Problem List   Diagnosis     Right knee pain     Hypothyroidism     Class 2 obesity in adult     Nexplanon in place 12/14/2021 LT Arm       Surgical Hx:  has a past surgical history that includes Tonsillectomy; Laparoscopic cholecystectomy; and HYSTEROSCOPY W ENDOMETRIAL BX/POLYPECTOMY W/WO D&C.    Medications:   Current Outpatient Medications:      etonogestrel (NEXPLANON) 68 MG IMPL, 1 each (68 mg) by Subdermal route once, Disp: , Rfl:      ibuprofen (ADVIL/MOTRIN) 600 MG tablet, Take 1 tablet (600 mg) by mouth every 8 hours as needed for moderate pain, Disp: 30 tablet, Rfl: 0     levothyroxine (SYNTHROID/LEVOTHROID) 100 MCG tablet, Take 1 tablet (100 mcg) by mouth daily, Disp: 60 tablet, Rfl: 0     levothyroxine (SYNTHROID/LEVOTHROID) 112 MCG tablet, Take 1 tablet (112 mcg) by mouth daily, Disp: 60 tablet, Rfl: 0     Prenatal Vit-Fe Fumarate-FA (PRENATAL MULTIVITAMIN W/IRON) 27-0.8 MG tablet, Take 1 tablet by mouth daily, Disp: 90 tablet, Rfl: 3    Allergies: No Known Allergies    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   reports that she has never smoked. She has never used smokeless tobacco. She reports previous alcohol use. She reports that she does not use drugs.    Family Hx: family history includes Diabetes in her mother; Hypertension in her mother; No Known Problems in her father; Other - See Comments in her maternal grandfather.    REVIEW OF SYSTEMS: 10 point ROS neg other than the symptoms noted above in the HPI and PMH. Notables include  CONSTITUTIONAL:NEGATIVE for fever, chills, change  in weight  INTEGUMENTARY/SKIN: NEGATIVE for worrisome rashes, moles or lesions  MUSCULOSKELETAL:See HPI above  NEURO: NEGATIVE for weakness, dizziness or paresthesias    PHYSICAL EXAM:  /72   Ht 1.524 m (5')   Wt 76.2 kg (167 lb 14.4 oz)   BMI 32.79 kg/m     GENERAL APPEARANCE: healthy, alert, no distress  SKIN: no suspicious lesions or rashes  NEURO: Normal strength and tone, mentation intact and speech normal  PSYCH:  mentation appears normal and affect normal, not anxious  RESPIRATORY: No increased work of breathing.  HANDS: no clubbing, nail pitting  LYMPH: no palpable popliteal lymphadenopathy.    BILATERAL LOWER EXTREMITIES:  Gait: antalgic favoring left   Alignment: valgus  No gross deformities or masses.  No Quad atrophy, strength normal.  Intact sensation deep peroneal nerve, superficial peroneal nerve, med/lat tibial nerve, sural nerve, saphenous nerve  Intact EHL, EDL, TA, FHL, GS, quadriceps hamstrings and hip flexors  Toes warm and well perfused, brisk capillary refill. Palpable 2+ dp pulses.  Bilateral calf soft and nttp or squeeze.  DTRs: achilles 2+, patella 2+.  Edema: trace    LEFT KNEE EXAM:    Skin: intact, no ecchymosis or erythema  ROM: slight hyper extension to 105 flexion, anterior discomfort.  Tight hamstrings on straight leg raise.  Effusion: moderate-large.  Tender: diffuse, mild  McMurrays: negative    MCL: stable, and non-painful at both 0 and 30 degrees knee flexion  Varus stress: stable, and non-painful at both 0 and 30 degrees knee flexion  Lachmans: neg, firm endpoint  Posterior Drawer stable  Patellofemoral joint:                Apprehension: negative              Crepitations: mild   Grind: positive.    RIGHT KNEE EXAM:    Skin: intact, no ecchymosis or erythema  ROM: slight hyper extension to 140 flexion  Tight hamstrings on straight leg raise.  Effusion: none  Tender: NTTP med/lat joint line, anterior or posterior knee  McMurrays: negative    MCL: stable, and non-painful  at both 0 and 30 degrees knee flexion  Varus stress: stable, and non-painful at both 0 and 30 degrees knee flexion  Lachmans: neg, firm endpoint  Posterior Drawer stable  Patellofemoral joint:                Apprehension: negative              Crepitations: mild   Grind: positive.    X-RAY:  3 views left knee from 5/16/2022 were reviewed in clinic today. On my review, no obvious fractures or dislocations. Effusion.         ASSESSMENT/PLAN: Denise Cazares is a 32 year old female with acute left knee pain, effusion, likely aseptic.    * exam, images reviewed, joint looks ok  * similar to right knee 2/2021, effusion, likely inflammatory  * discussed aspiration of the left knee today, she elects to proceed, see procedure note below  * there is some sort of inflammatory condition going on with her, given multiple joint pains, swelling.  * consider cortisone in future as needed, need approval from primary care provider, pediatrician, given breastfeeding child.  * return to clinic as needed.    * All questions were addressed and answered prior to discharge from clinic today. The patient acknowledges an understanding of and agreement with the plan set forth during today's visit. Patient was advised to call our office or MyChart us if any further questions arise upon leaving our office today.        Forrest Jerome M.D., M.S.  Dept. of Orthopaedic Surgery  [facility]    Large Joint Injection/Arthocentesis: L knee joint    Date/Time: 5/16/2022 4:50 PM  Performed by: Forrest Jerome MD  Authorized by: Forrest Jerome MD     Indications:  Pain  Needle Size:  18 G  Guidance: landmark guided    Approach:  Superolateral  Location:  Knee      Aspirate amount (mL):  90  Aspirate:  Yellow and blood-tinged  Outcome:  Tolerated well, no immediate complications  Procedure discussed: discussed risks, benefits, and alternatives    Consent Given by:  Patient  Timeout: timeout called immediately prior to procedure     Prep: patient was prepped and draped in usual sterile fashion

## 2022-05-16 NOTE — LETTER
"    5/16/2022         RE: Denise Cazares  [address] [place] [facility] [address]        Dear Colleague,    Thank you for referring your patient, Denise Cazares, to the [facility]. Please see a copy of my visit note below.    CHIEF COMPLAINT:   Chief Complaint   Patient presents with     Left Knee - Pain     Onset: 2 weeks. She had sciatica and so she could not step right and thinks that maybe that is what caused her knee pain. When she walks her knee will buckle. She notes she has swelling in her knee. Pain is over the whole knee. Pain is constant. She did one session of physical therapy.    .        HISTORY OF PRESENT ILLNESS    Denise Cazares is a 32 year old female seen for evaluation of ongoing left knee pain with no known injury.   Pain has been present for 2 weeks. She was dealing with sciatica, so her walking was off and thinks that may have causes her left knee to hurt. She feels like the knee \"alexandru\" with walking. She's had some swelling in the knee. Locates pain throughout the knee now, started along the inner aspect. Pain is constant, worse with walking.  Treatment with elevation, ice, compression sleeve but was too small and caused more swelling in the toes. Difficulties bending. Was taking ibuprofen which helped, but then would be more active during the day as it felt better but would have more pain/swelling end of day.  Pain at night. Uncomfortable at rest.      Had some numbness and tingling in toes when wearing knee brace/sleeve, thinks was too tight.    She did Physical Therapy x1 session.    Has had left foot pain, right shoulder pain as well.      She's had some inflammatory labs which were elevated, Crp 20.2, ESR 40.    She was seen a year ago (2/2021) for right knee pain, swelling. Had an aspiration only at that time, symptoms improved.    Was told has hypermobile joints.    Breastfeeding 7month old child whom currently being treated for Leukemia at " [facility] with chemotherapy.    Present symptoms: pain diffuse, pain dull/achy , moderate pain, moderate swelling.    Pain severity: 7/10  Frequency of symptoms: are constant  Exacerbating Factors: weight bearing, stairs, [id number], prolonged standing  Relieving Factors: elevation  Night Pain: Yes  Pain while at rest: Yes   Numbness or tingling: occasional   Patient has tried:     NSAIDS: Yes      Physical Therapy: Yes      Activity modification: Yes      Bracing: Yes      Injections: No      Ice: Yes      Assistive device:  No    Other PMH:  has a past medical history of Anemia, Chickenpox, Endometrial polyp, and Hypothyroidism.  Patient Active Problem List   Diagnosis     Right knee pain     Hypothyroidism     Class 2 obesity in adult     Nexplanon in place 12/14/2021 LT Arm       Surgical Hx:  has a past surgical history that includes Tonsillectomy; Laparoscopic cholecystectomy; and HYSTEROSCOPY W ENDOMETRIAL BX/POLYPECTOMY W/WO D&C.    Medications:   Current Outpatient Medications:      etonogestrel (NEXPLANON) 68 MG IMPL, 1 each (68 mg) by Subdermal route once, Disp: , Rfl:      ibuprofen (ADVIL/MOTRIN) 600 MG tablet, Take 1 tablet (600 mg) by mouth every 8 hours as needed for moderate pain, Disp: 30 tablet, Rfl: 0     levothyroxine (SYNTHROID/LEVOTHROID) 100 MCG tablet, Take 1 tablet (100 mcg) by mouth daily, Disp: 60 tablet, Rfl: 0     levothyroxine (SYNTHROID/LEVOTHROID) 112 MCG tablet, Take 1 tablet (112 mcg) by mouth daily, Disp: 60 tablet, Rfl: 0     Prenatal Vit-Fe Fumarate-FA (PRENATAL MULTIVITAMIN W/IRON) 27-0.8 MG tablet, Take 1 tablet by mouth daily, Disp: 90 tablet, Rfl: 3    Allergies: No Known Allergies    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   reports that she has never smoked. She has never used smokeless tobacco. She reports previous alcohol use. She reports that she does not use drugs.    Family Hx: family history includes Diabetes in her mother;  Hypertension in her mother; No Known Problems in her father; Other - See Comments in her maternal grandfather.    REVIEW OF SYSTEMS: 10 point ROS neg other than the symptoms noted above in the HPI and PMH. Notables include  CONSTITUTIONAL:NEGATIVE for fever, chills, change in weight  INTEGUMENTARY/SKIN: NEGATIVE for worrisome rashes, moles or lesions  MUSCULOSKELETAL:See HPI above  NEURO: NEGATIVE for weakness, dizziness or paresthesias    PHYSICAL EXAM:  /72   Ht 1.524 m (5')   Wt 76.2 kg (167 lb 14.4 oz)   BMI 32.79 kg/m     GENERAL APPEARANCE: healthy, alert, no distress  SKIN: no suspicious lesions or rashes  NEURO: Normal strength and tone, mentation intact and speech normal  PSYCH:  mentation appears normal and affect normal, not anxious  RESPIRATORY: No increased work of breathing.  HANDS: no clubbing, nail pitting  LYMPH: no palpable popliteal lymphadenopathy.    BILATERAL LOWER EXTREMITIES:  Gait: antalgic favoring left   Alignment: valgus  No gross deformities or masses.  No Quad atrophy, strength normal.  Intact sensation deep peroneal nerve, superficial peroneal nerve, med/lat tibial nerve, sural nerve, saphenous nerve  Intact EHL, EDL, TA, FHL, GS, quadriceps hamstrings and hip flexors  Toes warm and well perfused, brisk capillary refill. Palpable 2+ dp pulses.  Bilateral calf soft and nttp or squeeze.  DTRs: achilles 2+, patella 2+.  Edema: trace    LEFT KNEE EXAM:    Skin: intact, no ecchymosis or erythema  ROM: slight hyper extension to 105 flexion, anterior discomfort.  Tight hamstrings on straight leg raise.  Effusion: moderate-large.  Tender: diffuse, mild  McMurrays: negative    MCL: stable, and non-painful at both 0 and 30 degrees knee flexion  Varus stress: stable, and non-painful at both 0 and 30 degrees knee flexion  Lachmans: neg, firm endpoint  Posterior Drawer stable  Patellofemoral joint:                Apprehension: negative              Crepitations: mild   Grind:  positive.    RIGHT KNEE EXAM:    Skin: intact, no ecchymosis or erythema  ROM: slight hyper extension to 140 flexion  Tight hamstrings on straight leg raise.  Effusion: none  Tender: NTTP med/lat joint line, anterior or posterior knee  McMurrays: negative    MCL: stable, and non-painful at both 0 and 30 degrees knee flexion  Varus stress: stable, and non-painful at both 0 and 30 degrees knee flexion  Lachmans: neg, firm endpoint  Posterior Drawer stable  Patellofemoral joint:                Apprehension: negative              Crepitations: mild   Grind: positive.    X-RAY:  3 views left knee from 5/16/2022 were reviewed in clinic today. On my review, no obvious fractures or dislocations. Effusion.         ASSESSMENT/PLAN: Denise Cazares is a 32 year old female with acute left knee pain, effusion, likely aseptic.    * exam, images reviewed, joint looks ok  * similar to right knee 2/2021, effusion, likely inflammatory  * discussed aspiration of the left knee today, she elects to proceed, see procedure note below  * there is some sort of inflammatory condition going on with her, given multiple joint pains, swelling.  * consider cortisone in future as needed, need approval from primary care provider, pediatrician, given breastfeeding child.  * return to clinic as needed.    * All questions were addressed and answered prior to discharge from clinic today. The patient acknowledges an understanding of and agreement with the plan set forth during today's visit. Patient was advised to call our office or MyChart us if any further questions arise upon leaving our office today.        Forrest Jerome M.D., M.S.  Dept. of Orthopaedic Surgery  [facility]    Large Joint Injection/Arthocentesis: L knee joint    Date/Time: 5/16/2022 4:50 PM  Performed by: Forrest Jerome MD  Authorized by: Forrest Jerome MD     Indications:  Pain  Needle Size:  18 G  Guidance: landmark guided    Approach:   Superolateral  Location:  Knee      Aspirate amount (mL):  90  Aspirate:  Yellow and blood-tinged  Outcome:  Tolerated well, no immediate complications  Procedure discussed: discussed risks, benefits, and alternatives    Consent Given by:  Patient  Timeout: timeout called immediately prior to procedure    Prep: patient was prepped and draped in usual sterile fashion              Again, thank you for allowing me to participate in the care of your patient.        Sincerely,        Forrest Jerome MD

## 2022-05-17 ENCOUNTER — VIRTUAL VISIT (OUTPATIENT)
Dept: FAMILY MEDICINE | Facility: CLINIC | Age: 32
End: 2022-05-17
Payer: COMMERCIAL

## 2022-05-17 DIAGNOSIS — M13.0 POLYARTHRITIS OF MULTIPLE SITES: Primary | ICD-10-CM

## 2022-05-17 PROCEDURE — 99213 OFFICE O/P EST LOW 20 MIN: CPT | Mod: 95 | Performed by: FAMILY MEDICINE

## 2022-05-17 NOTE — PROGRESS NOTES
Denise is a 32 year old who is being evaluated via a billable video visit.      How would you like to obtain your AVS? MyChart  If the video visit is dropped, the invitation should be resent by:   Will anyone else be joining your video visit?       Video Start Time: 2:17 PM    Assessment & Plan     Polyarthritis of multiple sites  Patient was multiple joint inflammation with history of painful effusions in both knees as well as her foot and now back issues as well elevated inflammatory markers do additional lab work and put in rheumatology referral she does have a steroid that she will take a brief course of  - Cyclic Citrullinated Peptide Antibody IgG; Future  - Lyme Disease Total Abs Bld with Reflex to Confirm CLIA; Future  - Adult Rheumatology  Referral; Future  - CBC with Platelets & Differential; Future                 No follow-ups on file.    Zenon Bender MD  [facility]    Subjective   Denise is a 32 year old who presents for the following health issues patient is struggling with joint pain.  She had issues last year one of her knees got swollen tender and it was hard to walk on she was seen and treated and everything seem to be well until recent issues.  She has had issues with a nevus and swollen and tender and was aspirated yesterday she is told me 90 cc were removed but she does not think it was sent for any studies.  She also has swelling and tenderness of one of her feet.  She has lower back issues as well.  She is under a lot of stress as her daughter is being treated for leukemia.  Patient has had no fevers chills or sweats no rashes.  No family history of inflammatory joint disease.  She has hypothyroidism and is being treated.    History of Present Illness       Hypothyroidism:     Since last visit, patient describes the following symptoms::  Dry skin and Hair loss    Reason for visit:  Inflammation of my left foot  Symptom onset:  1-2 weeks ago  Symptoms  include:  Unable to walk correctly and put weight on left foot.  Symptom intensity:  Moderate  Symptom progression:  Staying the same  Had these symptoms before:  No  What makes it worse:  Walking/standing  What makes it better:  RICE    She eats 2-3 servings of fruits and vegetables daily.She consumes 1 sweetened beverage(s) daily.She exercises with enough effort to increase her heart rate 9 or less minutes per day.  She exercises with enough effort to increase her heart rate 3 or less days per week. She is missing 1 dose(s) of medications per week.  She is not taking prescribed medications regularly due to remembering to take.       Discuss medrol dosepak to help with inflammation. Did get ok from oncology - oral tabs ok.       Review of Systems   Constitutional, HEENT, cardiovascular, pulmonary, gi and gu systems are negative, except as otherwise noted.      Objective             Physical Exam   GENERAL: Healthy, alert and no distress  EYES: Eyes grossly normal to inspection.  No discharge or erythema, or obvious scleral/conjunctival abnormalities.  RESP: No audible wheeze, cough, or visible cyanosis.  No visible retractions or increased work of breathing.    SKIN: Visible skin clear. No significant rash, abnormal pigmentation or lesions.  NEURO: Cranial nerves grossly intact.  Mentation and speech appropriate for age.  PSYCH: Mentation appears normal, affect normal/bright, judgement and insight intact, normal speech and appearance well-groomed.                Video-Visit Details    Type of service:  Video Visit    Video End Time:2:19 PM    Originating Location (pt. Location): Home    Distant Location (provider location):  [facility]     Platform used for Video Visit: Cod[employer]

## 2022-05-18 ENCOUNTER — TELEPHONE (OUTPATIENT)
Dept: RHEUMATOLOGY | Facility: CLINIC | Age: 32
End: 2022-05-18

## 2022-05-18 ENCOUNTER — LAB (OUTPATIENT)
Dept: LAB | Facility: CLINIC | Age: 32
End: 2022-05-18
Payer: COMMERCIAL

## 2022-05-18 DIAGNOSIS — M13.0 POLYARTHRITIS OF MULTIPLE SITES: ICD-10-CM

## 2022-05-18 LAB
B BURGDOR IGG+IGM SER QL: 2.27
BASOPHILS # BLD AUTO: 0 10E3/UL (ref 0–0.2)
BASOPHILS NFR BLD AUTO: 0 %
EOSINOPHIL # BLD AUTO: 0.1 10E3/UL (ref 0–0.7)
EOSINOPHIL NFR BLD AUTO: 1 %
ERYTHROCYTE [DISTWIDTH] IN BLOOD BY AUTOMATED COUNT: 12.5 % (ref 10–15)
HCT VFR BLD AUTO: 40.8 % (ref 35–47)
HGB BLD-MCNC: 12.9 G/DL (ref 11.7–15.7)
IMM GRANULOCYTES # BLD: 0 10E3/UL
IMM GRANULOCYTES NFR BLD: 0 %
LYMPHOCYTES # BLD AUTO: 2.6 10E3/UL (ref 0.8–5.3)
LYMPHOCYTES NFR BLD AUTO: 36 %
MCH RBC QN AUTO: 28.5 PG (ref 26.5–33)
MCHC RBC AUTO-ENTMCNC: 31.6 G/DL (ref 31.5–36.5)
MCV RBC AUTO: 90 FL (ref 78–100)
MONOCYTES # BLD AUTO: 0.4 10E3/UL (ref 0–1.3)
MONOCYTES NFR BLD AUTO: 5 %
NEUTROPHILS # BLD AUTO: 4 10E3/UL (ref 1.6–8.3)
NEUTROPHILS NFR BLD AUTO: 58 %
NRBC # BLD AUTO: 0 10E3/UL
NRBC BLD AUTO-RTO: 0 /100
PLATELET # BLD AUTO: 266 10E3/UL (ref 150–450)
RBC # BLD AUTO: 4.52 10E6/UL (ref 3.8–5.2)
WBC # BLD AUTO: 7.1 10E3/UL (ref 4–11)

## 2022-05-18 PROCEDURE — 86200 CCP ANTIBODY: CPT

## 2022-05-18 PROCEDURE — 86617 LYME DISEASE ANTIBODY: CPT | Mod: 59 | Performed by: FAMILY MEDICINE

## 2022-05-18 PROCEDURE — 85025 COMPLETE CBC W/AUTO DIFF WBC: CPT

## 2022-05-18 PROCEDURE — 86618 LYME DISEASE ANTIBODY: CPT

## 2022-05-18 PROCEDURE — 36415 COLL VENOUS BLD VENIPUNCTURE: CPT

## 2022-05-18 NOTE — TELEPHONE ENCOUNTER
[facility] Call Center    Phone Message    May a detailed message be left on voicemail: no     Priority: 1-2 Weeks referral received, please advise if Dr. Ocampo is able to see within this timeframe.    Reason for Call: Appointment Intake    Referring Provider Name: Zenon Bender MD  Diagnosis and/or Symptoms: Polyarthritis of multiple sites

## 2022-05-18 NOTE — RESULT ENCOUNTER NOTE
Tray Walker, your RF (rheumatoid arthritis factor) and your BAYLEE (lupus test) are both negative.  Please contact if any questions.   Biju Campbell MD

## 2022-05-18 NOTE — TELEPHONE ENCOUNTER
Dr Ocampo doesn't have any openings as of now within 1-2 weeks, can add to waitlist and add to next available (early July I believe)

## 2022-05-19 LAB — CCP AB SER IA-ACNC: 2.6 U/ML

## 2022-05-20 ENCOUNTER — HOSPITAL ENCOUNTER (EMERGENCY)
Facility: CLINIC | Age: 32
Discharge: HOME | End: 2022-05-20
Attending: EMERGENCY MEDICINE | Admitting: EMERGENCY MEDICINE
Payer: COMMERCIAL

## 2022-05-20 ENCOUNTER — APPOINTMENT (OUTPATIENT)
Dept: GENERAL RADIOLOGY | Facility: CLINIC | Age: 32
End: 2022-05-20
Attending: EMERGENCY MEDICINE
Payer: COMMERCIAL

## 2022-05-20 VITALS
WEIGHT: 167 LBS | TEMPERATURE: 98.1 F | HEART RATE: 62 BPM | RESPIRATION RATE: 14 BRPM | BODY MASS INDEX: 32.79 KG/M2 | OXYGEN SATURATION: 98 % | DIASTOLIC BLOOD PRESSURE: 47 MMHG | SYSTOLIC BLOOD PRESSURE: 105 MMHG | HEIGHT: 60 IN

## 2022-05-20 DIAGNOSIS — M25.462 EFFUSION OF LEFT KNEE: ICD-10-CM

## 2022-05-20 DIAGNOSIS — M25.562 ACUTE PAIN OF LEFT KNEE: ICD-10-CM

## 2022-05-20 PROCEDURE — 73562 X-RAY EXAM OF KNEE 3: CPT | Mod: LT

## 2022-05-20 PROCEDURE — 99282 EMERGENCY DEPT VISIT SF MDM: CPT | Performed by: EMERGENCY MEDICINE

## 2022-05-20 PROCEDURE — 99283 EMERGENCY DEPT VISIT LOW MDM: CPT

## 2022-05-20 ASSESSMENT — ENCOUNTER SYMPTOMS
NAUSEA: 0
NUMBNESS: 0
VOMITING: 0
WOUND: 0
FEVER: 0
COUGH: 0
WEAKNESS: 0

## 2022-05-20 NOTE — ED PROVIDER NOTES
ED Provider Note  [facility]      History     Chief Complaint   Patient presents with     Knee Pain     Left     HPI  Denise Cazares is a 32 year old female who presents to the emergency department for evaluation of left knee pain.  Patient states she was walking in her daughter's hospital room yesterday when her knee buckled.  It subsequently became swollen and painful.  She has had a recent history of polyarthritis and has undergone knee aspiration.  In fact, the patient had her left knee aspirated 4 days ago.  She states that subsequent to the knee aspiration, she was having no difficulties.  She denies any fevers or recent illness.  She has had difficulty bearing weight secondary to pain of the left knee.  She also states that it has now become swollen again as well.  She denies any acute pain in her left hip or ankle.  She denies any anticoagulant use.  She did not fall when her knee buckled.  She has had previous history of her knee buckling as well.  She is not currently using any kind of knee immobilizer or crutches although she does have some crutches at home.    Past Medical History  Past Medical History:   Diagnosis Date     Anemia      Chickenpox      Endometrial polyp      Hypothyroidism      Past Surgical History:   Procedure Laterality Date     AS HYSTEROSCOPY W ENDOMETRIAL BX/POLYPECTOMY W/WO D&C       LAPAROSCOPIC CHOLECYSTECTOMY       TONSILLECTOMY       ibuprofen (ADVIL/MOTRIN) 600 MG tablet  levothyroxine (SYNTHROID/LEVOTHROID) 112 MCG tablet  Prenatal Vit-Fe Fumarate-FA (PRENATAL MULTIVITAMIN W/IRON) 27-0.8 MG tablet  etonogestrel (NEXPLANON) 68 MG IMPL      No Known Allergies  Family History  Family History   Problem Relation Age of Onset     Diabetes Mother      Hypertension Mother      No Known Problems Father      Other - See Comments Maternal Grandfather         MVA     Social History   Social History     Tobacco Use     Smoking status: Never Smoker      Smokeless tobacco: Never Used   Vaping Use     Vaping Use: Never used   Substance Use Topics     Alcohol use: Not Currently     Comment: occasionally-quit with pregnnacy     Drug use: Never      Past medical history, past surgical history, medications, allergies, family history, and social history were reviewed with the patient. No additional pertinent items.       Review of Systems   Constitutional: Negative for fever.   Respiratory: Negative for cough.    Gastrointestinal: Negative for nausea and vomiting.   Musculoskeletal:        See HPI   Skin: Negative for rash and wound.   Neurological: Negative for weakness and numbness.   All other systems reviewed and are negative.    A complete review of systems was performed with pertinent positives and negatives noted in the HPI, and all other systems negative.    Physical Exam   BP: 108/70  Pulse: 72  Temp: 98.6  F (37  C)  Resp: 16  Height: 152.4 cm (5')  Weight: 75.8 kg (167 lb)  SpO2: 97 %  Physical Exam  Vitals and nursing note reviewed.   Constitutional:       Appearance: Normal appearance.   Cardiovascular:      Pulses: Normal pulses.   Musculoskeletal:      Left hip: Normal.      Left knee: Swelling present. No erythema, ecchymosis, bony tenderness or crepitus. Decreased range of motion (Flexion to 90 degrees.  Extension lacks approximately 10 degrees.). No tenderness.      Right lower leg: No edema.      Left lower leg: Normal. No edema.      Left ankle: Normal.      Left Achilles Tendon: Normal.      Left foot: Normal range of motion. No bony tenderness. Normal pulse.   Skin:     General: Skin is warm and dry.      Capillary Refill: Capillary refill takes less than 2 seconds.   Neurological:      Mental Status: She is alert.      Sensory: No sensory deficit.      Motor: No weakness.         ED Course      Procedures            Results for orders placed or performed during the hospital encounter of 05/20/22   XR Knee Left 3 Views     Status: None    Narrative     EXAM: LEFT KNEE THREE VIEWS    DATE/TIME: 5/20/2022 1:11 PM    INDICATION: Pain, trauma.  COMPARISON: 5/16/2022.      Impression    IMPRESSION: Anatomic alignment left knee. No acute displaced left knee  fracture is identified. Normal joint spaces. Increasing large left  knee joint effusion. Anterior left knee soft tissue swelling.        ISH RECINOS MD         SYSTEM ID:  [id number]             Results for orders placed or performed during the hospital encounter of 05/20/22   XR Knee Left 3 Views     Status: None    Narrative    EXAM: LEFT KNEE THREE VIEWS    DATE/TIME: 5/20/2022 1:11 PM    INDICATION: Pain, trauma.  COMPARISON: 5/16/2022.      Impression    IMPRESSION: Anatomic alignment left knee. No acute displaced left knee  fracture is identified. Normal joint spaces. Increasing large left  knee joint effusion. Anterior left knee soft tissue swelling.        ISH RECINOS MD         SYSTEM ID:  [id number]     Medications - No data to display     Assessments & Plan (with Medical Decision Making)   32 year old female to the emergency department with left knee pain after it buckled when she was walking her daughter's hospital room yesterday.  Earlier this week, she had an arthrocentesis of the same knee for a knee effusion.  Patient feels that the knee effusion has now recurred.  The patient does have clinical evidence for knee effusion without any erythema or warmth on exam.  She has mild limitation in her flexion and extension of the knee.  Radiograph does not reveal any bony abnormality.  Will place patient in knee immobilizer.  She has crutches to utilize-will minimize weightbearing to pain-free weightbearing status.  Ibuprofen, ice, and elevation recommended.  She has follow-up planned with her orthopedist on Monday for recheck and further management.  Indications for return provided.    I have reviewed the nursing notes. I have reviewed the findings, diagnosis, plan and need for follow up with the  patient.    Discharge Medication List as of 5/20/2022  1:34 PM          Final diagnoses:   Acute pain of left knee   Effusion of left knee     Chart documentation was completed with Dragon voice-recognition software. Even though reviewed, this chart may still contain some grammatical, spelling, and word errors.     --  Jose Jang Md  [facility] EMERGENCY DEPARTMENT  5/20/2022     Jose Jang MD  05/20/22 0833

## 2022-05-20 NOTE — DISCHARGE INSTRUCTIONS
Wear knee immobilizer when up.  Use crutches-pain-free weightbearing on left leg.  Ice for 20 minutes at a time, 3-4 times per day, as directed.  Place a towel between the ice bag and your skin.    Take ibuprofen 600 mg every 6 hours with food as needed for pain.    Follow-up with orthopedics on Monday as planned.    Return to emergency department if fever, worsening symptoms, or other concerns.

## 2022-05-20 NOTE — ED TRIAGE NOTES
"Patient reports fluid removal from left knee d/t joint effusion on 5/16/2022. States she was walking yesterday and her knee \"buckled\". Unable to bear weight now d/t severe pain.      Triage Assessment     Row Name 05/20/22 1114       Triage Assessment (Adult)    Airway WDL WDL       Respiratory WDL    Respiratory WDL WDL       Skin Circulation/Temperature WDL    Skin Circulation/Temperature WDL WDL       Cardiac WDL    Cardiac WDL WDL       Peripheral/Neurovascular WDL    Peripheral Neurovascular WDL WDL       Cognitive/Neuro/Behavioral WDL    Cognitive/Neuro/Behavioral WDL WDL              "

## 2022-05-23 ENCOUNTER — OFFICE VISIT (OUTPATIENT)
Dept: ORTHOPEDICS | Facility: CLINIC | Age: 32
End: 2022-05-23
Payer: COMMERCIAL

## 2022-05-23 VITALS
RESPIRATION RATE: 16 BRPM | HEART RATE: 79 BPM | HEIGHT: 60 IN | WEIGHT: 167 LBS | SYSTOLIC BLOOD PRESSURE: 117 MMHG | OXYGEN SATURATION: 97 % | BODY MASS INDEX: 32.79 KG/M2 | DIASTOLIC BLOOD PRESSURE: 78 MMHG

## 2022-05-23 DIAGNOSIS — M25.562 ACUTE PAIN OF LEFT KNEE: Primary | ICD-10-CM

## 2022-05-23 DIAGNOSIS — M25.462 EFFUSION, LEFT KNEE: ICD-10-CM

## 2022-05-23 LAB
APPEARANCE FLD: ABNORMAL
CELL COUNT BODY FLUID SOURCE: ABNORMAL
COLOR FLD: ABNORMAL
CRYSTALS SNV MICRO: NORMAL
LYMPHOCYTES NFR FLD MANUAL: 46 %
MONOS+MACROS NFR FLD MANUAL: 16 %
NEUTS BAND NFR FLD MANUAL: 38 %
WBC # FLD AUTO: 6030 /UL

## 2022-05-23 PROCEDURE — 87181 SC STD AGAR DILUTION PER AGT: CPT | Performed by: ORTHOPAEDIC SURGERY

## 2022-05-23 PROCEDURE — 99213 OFFICE O/P EST LOW 20 MIN: CPT | Mod: 25 | Performed by: ORTHOPAEDIC SURGERY

## 2022-05-23 PROCEDURE — 20610 DRAIN/INJ JOINT/BURSA W/O US: CPT | Mod: LT | Performed by: ORTHOPAEDIC SURGERY

## 2022-05-23 PROCEDURE — 87186 SC STD MICRODIL/AGAR DIL: CPT | Performed by: ORTHOPAEDIC SURGERY

## 2022-05-23 PROCEDURE — 89060 EXAM SYNOVIAL FLUID CRYSTALS: CPT | Performed by: ORTHOPAEDIC SURGERY

## 2022-05-23 PROCEDURE — 87070 CULTURE OTHR SPECIMN AEROBIC: CPT | Performed by: ORTHOPAEDIC SURGERY

## 2022-05-23 PROCEDURE — 87077 CULTURE AEROBIC IDENTIFY: CPT | Performed by: ORTHOPAEDIC SURGERY

## 2022-05-23 PROCEDURE — 99000 SPECIMEN HANDLING OFFICE-LAB: CPT | Performed by: ORTHOPAEDIC SURGERY

## 2022-05-23 PROCEDURE — 89051 BODY FLUID CELL COUNT: CPT | Performed by: ORTHOPAEDIC SURGERY

## 2022-05-23 PROCEDURE — 87476 LYME DIS DNA AMP PROBE: CPT | Mod: 90 | Performed by: ORTHOPAEDIC SURGERY

## 2022-05-23 PROCEDURE — 87205 SMEAR GRAM STAIN: CPT | Performed by: ORTHOPAEDIC SURGERY

## 2022-05-23 RX ORDER — BUPIVACAINE HYDROCHLORIDE 2.5 MG/ML
4 INJECTION, SOLUTION INFILTRATION; PERINEURAL
Status: DISCONTINUED | OUTPATIENT
Start: 2022-05-23 | End: 2023-10-31

## 2022-05-23 RX ADMIN — BUPIVACAINE HYDROCHLORIDE 4 ML: 2.5 INJECTION, SOLUTION INFILTRATION; PERINEURAL at 15:01

## 2022-05-23 ASSESSMENT — PAIN SCALES - GENERAL: PAINLEVEL: NO PAIN (0)

## 2022-05-23 NOTE — PROGRESS NOTES
"CHIEF COMPLAINT:   Chief Complaint   Patient presents with     Follow Up     Ongoing left knee pain x 3 weeks.Patient states she was walking in her daughter's hospital room 5/19/2022 when her knee buckled again. She has had difficulty bearing weight.Her knee swelled up again. She went to the ED and states they didn't really do anything for her.   .        HISTORY OF PRESENT ILLNESS    Denise Cazares is a 32 year old female seen for evaluation of ongoing left knee pain with no known injury.   Pain has been present for 3 weeks. Was seen last week and had fluid aspirated on the knee which felt good. Now, she was walking in her daughter's hospital room 5/19/2022 and the left knee \"buckled\" and swelled up again. She went to the ED and states they didn't really do anything for her. She returns today stating the swelling is a lot better today than it was last week. Couldn't put any pressure on it. Has been in an immobilizer now. Icing and elevating. Able to bend the knee more than she could but not yet 100%. Had some \"burning\" pain in front of the knee/shin area.    She states more lab work was done and has positive lyme antibody test, 5/18/2022.        Recall prior to last week's visit on 5/16/2022 she was dealing with sciatica, so her walking was off and thinks that may have causes her left knee to hurt. She feels like the knee \"alexandru\" with walking. She's had some swelling in the knee. Locates pain throughout the knee now, started along the inner aspect. Pain is constant, worse with walking.  Treatment with elevation, ice, compression sleeve but was too small and caused more swelling in the toes. Difficulties bending. Was taking ibuprofen which helped, but then would be more active during the day as it felt better but would have more pain/swelling end of day.  Pain at night. Uncomfortable at rest.      Had some numbness and tingling in toes when wearing knee brace/sleeve, thinks was too tight.    She did Physical " Therapy x1 session.    Has had left foot pain, right shoulder pain as well.      She's had some inflammatory labs which were elevated, Crp 20.2, ESR 40.    She was seen a year ago (2/2021) for right knee pain, swelling. Had an aspiration only at that time, symptoms improved.    Was told has hypermobile joints.    Breastfeeding 7 month old child whom currently being treated for Leukemia at [facility] with chemotherapy.    Present symptoms: pain diffuse, pain dull/achy , moderate pain, moderate swelling.    Pain severity: 7/10  Frequency of symptoms: are constant  Exacerbating Factors: weight bearing, stairs, [id number], prolonged standing  Relieving Factors: elevation  Night Pain: Yes  Pain while at rest: Yes   Numbness or tingling: occasional   Patient has tried:     NSAIDS: Yes      Physical Therapy: Yes      Activity modification: Yes      Bracing: Yes      Injections: No      Ice: Yes      Assistive device:  No    Other PMH:  has a past medical history of Anemia, Chickenpox, Endometrial polyp, and Hypothyroidism.  Patient Active Problem List   Diagnosis     Right knee pain     Hypothyroidism     Class 2 obesity in adult     Nexplanon in place 12/14/2021 LT Arm       Surgical Hx:  has a past surgical history that includes Tonsillectomy; Laparoscopic cholecystectomy; and HYSTEROSCOPY W ENDOMETRIAL BX/POLYPECTOMY W/WO D&C.    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   reports that she has never smoked. She has never used smokeless tobacco. She reports previous alcohol use. She reports that she does not use drugs.    Family Hx: family history includes Diabetes in her mother; Hypertension in her mother; No Known Problems in her father; Other - See Comments in her maternal grandfather.    REVIEW OF SYSTEMS: 10 point ROS neg other than the symptoms noted above in the HPI and PMH. Notables include  CONSTITUTIONAL:NEGATIVE for fever, chills, change in weight  INTEGUMENTARY/SKIN: NEGATIVE for worrisome rashes, moles or lesions  MUSCULOSKELETAL:See HPI above  NEURO: NEGATIVE for weakness, dizziness or paresthesias    PHYSICAL EXAM:  /78   Pulse 79   Resp 16   Ht 1.524 m (5')   Wt 75.8 kg (167 lb)   SpO2 97%   BMI 32.61 kg/m     GENERAL APPEARANCE: healthy, alert, no distress; accompanied by her .  SKIN: no suspicious lesions or rashes  NEURO: Normal strength and tone, mentation intact and speech normal  PSYCH:  mentation appears normal and affect normal, not anxious  RESPIRATORY: No increased work of breathing.    BILATERAL LOWER EXTREMITIES:  Gait: antalgic favoring left with knee immobilizer.  Alignment: valgus  No Quad atrophy, strength weak  Intact sensation deep peroneal nerve, superficial peroneal nerve, med/lat tibial nerve, sural nerve, saphenous nerve  Intact EHL, EDL, TA, FHL, GS, quadriceps hamstrings and hip flexors  Toes warm and well perfused, brisk capillary refill. Palpable 2+ dp pulses.  Bilateral calf soft and nttp or squeeze.  Edema: trace    LEFT KNEE EXAM:    Skin: intact, no ecchymosis or erythema  ROM: slight hyper extension to 105 flexion, anterior discomfort.  Tight hamstrings on straight leg raise.  Effusion: moderate  Tender: diffuse, mild  McMurrays: negative    Varus/valgus laxity with endpoint  Lachmans: neg, firm endpoint  Posterior Drawer stable  Patellofemoral joint:  "               Apprehension: negative              Crepitations: mild   Grind: positive.    RIGHT KNEE EXAM:    Skin: intact, no ecchymosis or erythema  ROM: slight hyper extension to 140 flexion  Tight hamstrings on straight leg raise.  Effusion: none  Tender: NTTP med/lat joint line, anterior or posterior knee  McMurrays: negative    MCL: stable, and non-painful at both 0 and 30 degrees knee flexion  Varus stress: stable, and non-painful at both 0 and 30 degrees knee flexion  Lachmans: neg, firm endpoint  Posterior Drawer stable  Patellofemoral joint:                Apprehension: negative              Crepitations: mild   Grind: positive.    X-RAY: no new images today. 3 views left knee from 5/20/2022, 5/16/2022 -- no obvious fractures or dislocations. Effusion.         ASSESSMENT/PLAN: Denise Cazares is a 32 year old female with acute left knee pain, effusion, likely aseptic, Lyme disease.    * its possible lyme disease could be causing her joint pain, swelling. Lyme arthritis is very common presentation for lyme disease. Treatment is treating the lyme disease with antibiotics.  * I will wait to see how the medicine doctors want to proceed with the positive antibody finding.  * given the \"buckling\", will refer for an MRI to see if there is any structural reason for the buckling or whether just pain, swelling related.  * recommend rest, ice, elevation, weight bearing with immobilizer to prevent buckling.    * there is definitely some sort of inflammatory condition going on with her, given multiple joint pains, swelling.    * will reaspirate today and send fluid for gram stain, cell count with differential, crystals, cultures, lyme pcr. Suspect these will be elevated.    * return to clinic as needed.    * All questions were addressed and answered prior to discharge from clinic today. The patient acknowledges an understanding of and agreement with the plan set forth during today's visit. Patient was advised to " call our office or MyChart us if any further questions arise upon leaving our office today.        Forrest Jerome M.D., M.S.  Dept. of Orthopaedic Surgery  [facility]    Large Joint Injection/Arthocentesis: L knee joint    Date/Time: 5/23/2022 3:01 PM  Performed by: Forrest Jerome MD  Authorized by: Forrest Jerome MD     Indications:  Pain  Needle Size:  18 G  Guidance: landmark guided    Approach:  Superolateral  Location:  Knee      Medications:  4 mL bupivacaine 0.25 %  Aspirate amount (mL):  60  Aspirate:  Serous and blood-tinged  Aspirate analysis: sent for lab analysis    Outcome:  Tolerated well, no immediate complications  Procedure discussed: discussed risks, benefits, and alternatives    Consent Given by:  Patient  Timeout: timeout called immediately prior to procedure    Prep: patient was prepped and draped in usual sterile fashion

## 2022-05-23 NOTE — LETTER
"    5/23/2022         RE: Denise Cazares  [address] [place] [address]        Dear Colleague,    Thank you for referring your patient, Denise Cazares, to the [facility]. Please see a copy of my visit note below.    CHIEF COMPLAINT:   Chief Complaint   Patient presents with     Follow Up     Ongoing left knee pain x 3 weeks.Patient states she was walking in her daughter's hospital room 5/19/2022 when her knee buckled again. She has had difficulty bearing weight.Her knee swelled up again. She went to the ED and states they didn't really do anything for her.   .        HISTORY OF PRESENT ILLNESS    Denise Cazares is a 32 year old female seen for evaluation of ongoing left knee pain with no known injury.   Pain has been present for 3 weeks. Was seen last week and had fluid aspirated on the knee which felt good. Now, she was walking in her daughter's hospital room 5/19/2022 and the left knee \"buckled\" and swelled up again. She went to the ED and states they didn't really do anything for her. She returns today stating the swelling is a lot better today than it was last week. Couldn't put any pressure on it. Has been in an immobilizer now. Icing and elevating. Able to bend the knee more than she could but not yet 100%. Had some \"burning\" pain in front of the knee/shin area.    She states more lab work was done and has positive lyme antibody test, 5/18/2022.        Recall prior to last week's visit on 5/16/2022 she was dealing with sciatica, so her walking was off and thinks that may have causes her left knee to hurt. She feels like the knee \"alexandru\" with walking. She's had some swelling in the knee. Locates pain throughout the knee now, started along the inner aspect. Pain is constant, worse with walking.  Treatment with elevation, ice, compression sleeve but was too small and caused more swelling in the toes. Difficulties bending. Was taking ibuprofen which helped, but " then would be more active during the day as it felt better but would have more pain/swelling end of day.  Pain at night. Uncomfortable at rest.      Had some numbness and tingling in toes when wearing knee brace/sleeve, thinks was too tight.    She did Physical Therapy x1 session.    Has had left foot pain, right shoulder pain as well.      She's had some inflammatory labs which were elevated, Crp 20.2, ESR 40.    She was seen a year ago (2/2021) for right knee pain, swelling. Had an aspiration only at that time, symptoms improved.    Was told has hypermobile joints.    Breastfeeding 7 month old child whom currently being treated for Leukemia at [facility] with chemotherapy.    Present symptoms: pain diffuse, pain dull/achy , moderate pain, moderate swelling.    Pain severity: 7/10  Frequency of symptoms: are constant  Exacerbating Factors: weight bearing, stairs, [id number], prolonged standing  Relieving Factors: elevation  Night Pain: Yes  Pain while at rest: Yes   Numbness or tingling: occasional   Patient has tried:     NSAIDS: Yes      Physical Therapy: Yes      Activity modification: Yes      Bracing: Yes      Injections: No      Ice: Yes      Assistive device:  No    Other PMH:  has a past medical history of Anemia, Chickenpox, Endometrial polyp, and Hypothyroidism.  Patient Active Problem List   Diagnosis     Right knee pain     Hypothyroidism     Class 2 obesity in adult     Nexplanon in place 12/14/2021 LT Arm       Surgical Hx:  has a past surgical history that includes Tonsillectomy; Laparoscopic cholecystectomy; and HYSTEROSCOPY W ENDOMETRIAL BX/POLYPECTOMY W/WO D&C.    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   reports that she has never smoked. She has never used smokeless tobacco. She reports previous alcohol use. She reports that she does not use drugs.    Family Hx: family history includes Diabetes in her mother; Hypertension in her mother; No Known Problems in her father; Other - See Comments in her maternal grandfather.    REVIEW OF SYSTEMS: 10 point ROS neg other than the symptoms noted above in the HPI and PMH. Notables include  CONSTITUTIONAL:NEGATIVE for fever, chills, change in weight  INTEGUMENTARY/SKIN: NEGATIVE for worrisome rashes, moles or lesions  MUSCULOSKELETAL:See HPI above  NEURO: NEGATIVE for weakness, dizziness or paresthesias    PHYSICAL EXAM:  /78   Pulse 79   Resp 16   Ht 1.524 m (5')   Wt 75.8 kg (167 lb)   SpO2 97%   BMI 32.61 kg/m     GENERAL APPEARANCE: healthy, alert, no distress; accompanied by her .  SKIN: no suspicious lesions or rashes  NEURO: Normal strength and tone, mentation intact and speech normal  PSYCH:  mentation appears normal and affect normal, not anxious  RESPIRATORY: No increased work of breathing.    BILATERAL LOWER EXTREMITIES:  Gait: antalgic favoring left with knee immobilizer.  Alignment: valgus  No Quad atrophy, strength weak  Intact sensation deep peroneal nerve, superficial peroneal nerve, med/lat tibial nerve, sural nerve, saphenous nerve  Intact EHL, EDL, TA, FHL, GS, quadriceps hamstrings and hip flexors  Toes warm and well perfused, brisk capillary refill. Palpable 2+ dp pulses.  Bilateral calf soft and nttp or squeeze.  Edema: trace    LEFT KNEE EXAM:    Skin: intact, no ecchymosis or erythema  ROM: slight hyper extension to 105  "flexion, anterior discomfort.  Tight hamstrings on straight leg raise.  Effusion: moderate  Tender: diffuse, mild  McMurrays: negative    Varus/valgus laxity with endpoint  Lachmans: neg, firm endpoint  Posterior Drawer stable  Patellofemoral joint:                Apprehension: negative              Crepitations: mild   Grind: positive.    RIGHT KNEE EXAM:    Skin: intact, no ecchymosis or erythema  ROM: slight hyper extension to 140 flexion  Tight hamstrings on straight leg raise.  Effusion: none  Tender: NTTP med/lat joint line, anterior or posterior knee  McMurrays: negative    MCL: stable, and non-painful at both 0 and 30 degrees knee flexion  Varus stress: stable, and non-painful at both 0 and 30 degrees knee flexion  Lachmans: neg, firm endpoint  Posterior Drawer stable  Patellofemoral joint:                Apprehension: negative              Crepitations: mild   Grind: positive.    X-RAY: no new images today. 3 views left knee from 5/20/2022, 5/16/2022 -- no obvious fractures or dislocations. Effusion.         ASSESSMENT/PLAN: Denise Cazares is a 32 year old female with acute left knee pain, effusion, likely aseptic, Lyme disease.    * its possible lyme disease could be causing her joint pain, swelling. Lyme arthritis is very common presentation for lyme disease. Treatment is treating the lyme disease with antibiotics.  * I will wait to see how the medicine doctors want to proceed with the positive antibody finding.  * given the \"buckling\", will refer for an MRI to see if there is any structural reason for the buckling or whether just pain, swelling related.  * recommend rest, ice, elevation, weight bearing with immobilizer to prevent buckling.    * there is definitely some sort of inflammatory condition going on with her, given multiple joint pains, swelling.    * will reaspirate today and send fluid for gram stain, cell count with differential, crystals, cultures, lyme pcr. Suspect these will be " elevated.    * return to clinic as needed.    * All questions were addressed and answered prior to discharge from clinic today. The patient acknowledges an understanding of and agreement with the plan set forth during today's visit. Patient was advised to call our office or MyChart us if any further questions arise upon leaving our office today.        Forrest Jerome M.D., M.S.  Dept. of Orthopaedic Surgery  [facility]    Large Joint Injection/Arthocentesis: L knee joint    Date/Time: 5/23/2022 3:01 PM  Performed by: Forrest Jerome MD  Authorized by: Forrest Jerome MD     Indications:  Pain  Needle Size:  18 G  Guidance: landmark guided    Approach:  Superolateral  Location:  Knee      Medications:  4 mL bupivacaine 0.25 %  Aspirate amount (mL):  60  Aspirate:  Serous and blood-tinged  Aspirate analysis: sent for lab analysis    Outcome:  Tolerated well, no immediate complications  Procedure discussed: discussed risks, benefits, and alternatives    Consent Given by:  Patient  Timeout: timeout called immediately prior to procedure    Prep: patient was prepped and draped in usual sterile fashion              Again, thank you for allowing me to participate in the care of your patient.        Sincerely,        Forrest Jerome MD

## 2022-05-25 ENCOUNTER — TELEPHONE (OUTPATIENT)
Dept: FAMILY MEDICINE | Facility: CLINIC | Age: 32
End: 2022-05-25
Payer: COMMERCIAL

## 2022-05-25 DIAGNOSIS — M25.561 ACUTE PAIN OF RIGHT KNEE: Primary | ICD-10-CM

## 2022-05-25 LAB
B BURGDOR IGG SERPL QL IA: 8.09 INDEX
B BURGDOR IGG SERPL QL IA: REACTIVE
B BURGDOR IGG+IGM SER QL: NORMAL
B BURGDOR IGM SERPL QL IA: 0.09 INDEX
B BURGDOR IGM SERPL QL IA: NONREACTIVE

## 2022-05-25 RX ORDER — METHYLPREDNISOLONE 4 MG
TABLET, DOSE PACK ORAL
Qty: 21 TABLET | Refills: 0 | Status: SHIPPED | OUTPATIENT
Start: 2022-05-25 | End: 2022-09-26

## 2022-05-25 NOTE — TELEPHONE ENCOUNTER
I called patient regarding her Lyme results.  She does have a positive IgG but negative IgM most likely not an acute infection.  She never remembers having Lyme disease so.  She will go ahead and get her blood drawn for Ehrlichia and Anaplasma we will trial Medrol Dosepak consider doxycycline but she is breast-feeding and would have to pump and dump for the 2 weeks she is on it.  We will consider using doxycycline if she continues to have symptoms

## 2022-05-25 NOTE — TELEPHONE ENCOUNTER
Reason for Call:  Request for results:    Name of test or procedure: Lab    Date of test of procedure: 5.18    Location of the test or procedure: [place], [place]    OK to leave the result message on voice mail or with a family member? Patient would rather speak to TFS    Phone number Patient can be reached at:  Home number on file [phone] (home)    Additional comments: N/A    Call taken on 5/25/2022 at 12:48 PM by SAVANNAH BARILLAS

## 2022-05-27 ENCOUNTER — LAB (OUTPATIENT)
Dept: LAB | Facility: CLINIC | Age: 32
End: 2022-05-27
Payer: COMMERCIAL

## 2022-05-27 DIAGNOSIS — M25.562 ACUTE PAIN OF LEFT KNEE: ICD-10-CM

## 2022-05-27 DIAGNOSIS — M25.462 EFFUSION, LEFT KNEE: ICD-10-CM

## 2022-05-27 DIAGNOSIS — M25.561 ACUTE PAIN OF RIGHT KNEE: ICD-10-CM

## 2022-05-27 LAB — B BURGDOR DNA SPEC QL NAA+PROBE: NOT DETECTED

## 2022-05-27 PROCEDURE — 87798 DETECT AGENT NOS DNA AMP: CPT

## 2022-05-27 PROCEDURE — 36415 COLL VENOUS BLD VENIPUNCTURE: CPT

## 2022-05-27 PROCEDURE — 86617 LYME DISEASE ANTIBODY: CPT

## 2022-05-27 PROCEDURE — 86618 LYME DISEASE ANTIBODY: CPT

## 2022-05-30 LAB
A PHAGOCYTOPH DNA BLD QL NAA+PROBE: NOT DETECTED
E CHAFFEENSIS DNA BLD QL NAA+PROBE: NOT DETECTED
E EWINGII DNA SPEC QL NAA+PROBE: NOT DETECTED
EHRLICHIA DNA SPEC QL NAA+PROBE: NOT DETECTED

## 2022-05-31 ENCOUNTER — HOSPITAL ENCOUNTER (OUTPATIENT)
Dept: MRI IMAGING | Facility: CLINIC | Age: 32
Discharge: HOME | End: 2022-05-31
Attending: ORTHOPAEDIC SURGERY | Admitting: ORTHOPAEDIC SURGERY
Payer: COMMERCIAL

## 2022-05-31 DIAGNOSIS — M25.562 ACUTE PAIN OF LEFT KNEE: ICD-10-CM

## 2022-05-31 LAB
B BURGDOR IGG+IGM SER QL: 2.41
BACTERIA SNV CULT: ABNORMAL
BACTERIA SNV CULT: ABNORMAL
GRAM STAIN RESULT: ABNORMAL
GRAM STAIN RESULT: ABNORMAL

## 2022-05-31 PROCEDURE — 73721 MRI JNT OF LWR EXTRE W/O DYE: CPT | Mod: LT

## 2022-05-31 PROCEDURE — 73721 MRI JNT OF LWR EXTRE W/O DYE: CPT | Mod: 26 | Performed by: RADIOLOGY

## 2022-06-01 LAB
B BURGDOR IGG SERPL QL IA: 8.43 INDEX
B BURGDOR IGG SERPL QL IA: REACTIVE
B BURGDOR IGM SERPL QL IA: 0.12 INDEX
B BURGDOR IGM SERPL QL IA: NONREACTIVE

## 2022-06-02 ENCOUNTER — MYC MEDICAL ADVICE (OUTPATIENT)
Dept: ORTHOPEDICS | Facility: CLINIC | Age: 32
End: 2022-06-02
Payer: COMMERCIAL

## 2022-06-02 DIAGNOSIS — A69.23 ARTHRITIS DUE TO LYME DISEASE (H): Primary | ICD-10-CM

## 2022-06-02 RX ORDER — AMOXICILLIN 500 MG/1
500 CAPSULE ORAL 3 TIMES DAILY
Qty: 84 CAPSULE | Refills: 0 | Status: SHIPPED | OUTPATIENT
Start: 2022-06-02 | End: 2022-06-30

## 2022-06-02 NOTE — TELEPHONE ENCOUNTER
Pt notified of Lymes results. Amox sent to pharm. Pt notified of referral to infectious disease and given # to call if has not heard anything within a couple days.

## 2022-06-02 NOTE — TELEPHONE ENCOUNTER
Called and spoke to Denise regarding her MRI results, as well as numerous lab results.    Original fluid analysis showed 6K WBC, 38% PMNs, 36% lymphocytes. Cultures on broth showed likely contaminate of diptheroids, globicatella sanuinis. Lyme analysis showed reactive IgG.    MRI showed inflammatory changes versus infection versus pigmented villonodular synovitis.    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 I don't suspect other infection.    She states she was just started on antibiotics and referred to infectious disease for evaluation, management.    I discussed seeing how she does with this, otherwise referral to [facility] ortho tumor service for possible treatment of pigmented villonodular synovitis, surgical versus medical.    Overall, her knee is better, still a little sore/swollen but not like it was. Ok with walking, worse with prolonged sitting then getting up.    She will keep us informed of her progress.    Advised patient to call with any questions or concerns in the meantime.    Forrest Jerome M.D., M.S.  Dept. of Orthopaedic Surgery  [facility]

## 2022-06-24 ENCOUNTER — LAB (OUTPATIENT)
Dept: LAB | Facility: CLINIC | Age: 32
End: 2022-06-24
Payer: COMMERCIAL

## 2022-06-24 DIAGNOSIS — E03.9 HYPOTHYROIDISM, UNSPECIFIED TYPE: ICD-10-CM

## 2022-06-24 LAB
T4 FREE SERPL-MCNC: 0.76 NG/DL (ref 0.76–1.46)
TSH SERPL DL<=0.005 MIU/L-ACNC: 3.9 MU/L (ref 0.4–4)

## 2022-06-24 PROCEDURE — 36415 COLL VENOUS BLD VENIPUNCTURE: CPT

## 2022-06-24 PROCEDURE — 84443 ASSAY THYROID STIM HORMONE: CPT

## 2022-06-24 PROCEDURE — 84439 ASSAY OF FREE THYROXINE: CPT

## 2022-07-15 ENCOUNTER — OFFICE VISIT (OUTPATIENT)
Dept: INFECTIOUS DISEASES | Facility: CLINIC | Age: 32
End: 2022-07-15
Payer: COMMERCIAL

## 2022-07-15 VITALS
SYSTOLIC BLOOD PRESSURE: 105 MMHG | DIASTOLIC BLOOD PRESSURE: 67 MMHG | HEART RATE: 60 BPM | BODY MASS INDEX: 33.47 KG/M2 | TEMPERATURE: 98.2 F | WEIGHT: 171.4 LBS | OXYGEN SATURATION: 100 %

## 2022-07-15 DIAGNOSIS — M19.90 ARTHRITIS: Primary | ICD-10-CM

## 2022-07-15 DIAGNOSIS — R79.89 LOW VITAMIN D LEVEL: ICD-10-CM

## 2022-07-15 DIAGNOSIS — A69.23 ARTHRITIS DUE TO LYME DISEASE (H): ICD-10-CM

## 2022-07-15 LAB
ALBUMIN SERPL-MCNC: 3.8 G/DL (ref 3.4–5)
ALP SERPL-CCNC: 111 U/L (ref 40–150)
ALT SERPL W P-5'-P-CCNC: 32 U/L (ref 0–50)
ANION GAP SERPL CALCULATED.3IONS-SCNC: 2 MMOL/L (ref 3–14)
AST SERPL W P-5'-P-CCNC: 20 U/L (ref 0–45)
BILIRUB SERPL-MCNC: 1.5 MG/DL (ref 0.2–1.3)
BUN SERPL-MCNC: 10 MG/DL (ref 7–30)
CALCIUM SERPL-MCNC: 9.1 MG/DL (ref 8.5–10.1)
CHLORIDE BLD-SCNC: 109 MMOL/L (ref 94–109)
CO2 SERPL-SCNC: 31 MMOL/L (ref 20–32)
CREAT SERPL-MCNC: 0.56 MG/DL (ref 0.52–1.04)
CRP SERPL-MCNC: 6.3 MG/L (ref 0–8)
ERYTHROCYTE [SEDIMENTATION RATE] IN BLOOD BY WESTERGREN METHOD: 22 MM/HR (ref 0–20)
GFR SERPL CREATININE-BSD FRML MDRD: >90 ML/MIN/1.73M2
GLUCOSE BLD-MCNC: 88 MG/DL (ref 70–99)
HOLD SPECIMEN: NORMAL
POTASSIUM BLD-SCNC: 3.7 MMOL/L (ref 3.4–5.3)
PROT SERPL-MCNC: 8.1 G/DL (ref 6.8–8.8)
SODIUM SERPL-SCNC: 142 MMOL/L (ref 133–144)

## 2022-07-15 PROCEDURE — 36415 COLL VENOUS BLD VENIPUNCTURE: CPT | Performed by: INTERNAL MEDICINE

## 2022-07-15 PROCEDURE — 80053 COMPREHEN METABOLIC PANEL: CPT | Performed by: INTERNAL MEDICINE

## 2022-07-15 PROCEDURE — 82306 VITAMIN D 25 HYDROXY: CPT | Performed by: INTERNAL MEDICINE

## 2022-07-15 PROCEDURE — 86617 LYME DISEASE ANTIBODY: CPT | Performed by: INTERNAL MEDICINE

## 2022-07-15 PROCEDURE — 86140 C-REACTIVE PROTEIN: CPT | Performed by: INTERNAL MEDICINE

## 2022-07-15 PROCEDURE — 85652 RBC SED RATE AUTOMATED: CPT | Performed by: INTERNAL MEDICINE

## 2022-07-15 PROCEDURE — 99204 OFFICE O/P NEW MOD 45 MIN: CPT | Performed by: INTERNAL MEDICINE

## 2022-07-15 ASSESSMENT — PAIN SCALES - GENERAL: PAINLEVEL: MODERATE PAIN (5)

## 2022-07-15 NOTE — NURSING NOTE
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    Weight as of this encounter: 77.7 kg (171 lb 6.4 oz).    Medication Reconciliation: complete    Do you need any medication refills at today's visit? none    STEPHENIE Chatterjee  Rheumatology/Infectious disease  [facility]   [phone]

## 2022-07-16 NOTE — PROGRESS NOTES
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 She had left sided buttock pain In May 2022, then  her left ankle became painful and swollen. A few days later, she complained of left knee pain with swelling. left knee synovial fluid was aspirated on 5/16/22 -  fluid was red, turbid, 6030 WBC with 46% lymphocytesm 16 % mono, 38% neutrophils . cultures were positive for Globicatella sanguinis and GPB resembling diptheroids.        Latest Reference Range & Units 05/18/22 09:23 05/23/22 15:22 05/27/22 14:42   Lyme Disease Antibodies Serum <0.90  See Comment (C)  2.27 (H)  2.41 (H)   Lyme DNAPCR   Not Detected    Lyme Confirm IgG by CLIA Instrument Value <0.90 Index 8.09 (H)  8.43 (H)   Lyme Confirm IgG by CLIA Blood Nonreactive  Reactive !  Reactive !   Lyme Confirm IgM by CLIA Instrument Value <0.90 Index 0.09  0.12   Lyme Confirm IgM by CLIA Blood Nonreactive  Nonreactive  Nonreactive       she was prescribed with Amoxillin 500 mg po 3x/daily  x 28 days ( 6/2-6/28/22) .She is breast feeding her baby. Denise is stressed because her youngest child 9 months , has leukemia and undergoing chemotherapy .  she admits to skippping quite a few doses. She says her left knee is better after fluid aspiration and on Amoxillin, but her Right knee is hurting now. She also complains of right shoulder pain , as she tends to lay on her right side.in bed. She is right handed.      She denies any knowledge of tick bites, not an ourdoor person, no known rashes, her in door dog was tested negative for Lyme . no history of unexplained fever chills, nightsweats, nausea, vomiting, abdominal pain.     Imaging:   Small joint effusion. No Baker's cyst.     Small nodular T1/T2 hypointense joint bodies noted in the lateral  meniscotibial recess largest measuring 2 x 8 mm, see series 4 image  26.     Nodular T1/T2 hypointense focus in the suprapatellar fat along the  anterior aspect of the tibia measuring approximately 14 x 7 x 16 mm,  see series 4 image 12 and series 5 image 21.     Ill-defined T1/T2 hypointense nodularity along the posterior aspect of  Hoffa's fat pad including a nodular focus along the anterior aspect of  the anterior cruciate ligament measuring approximately 6 x 8 mm.     OSSEOUS and ARTICULAR STRUCTURES  (Modified Outerbridge criteria)     Bones: No fracture, contusion, or osseous lesion is seen.     Patellofemoral compartment: No high-grade hyaline cartilage disease.     Medial compartment: No high-grade hyaline cartilage disease.     Lateral compartment: No high-grade hyaline cartilage disease.     ANCILLARY FINDINGS  None.                                                                    Impression:  1. Nodular intra-articular foci as detailed above. Differential would include infectious or inflammatory etiology. Nodular synovitis and/or  PVNS are also included in the differential. Consider referral to [facility] oncology at the [facility].     2. Repeat MRI with gadolinium and susceptibility weighted imaging could be considered in 3-6 months.    ROS:  CONSTITUTIONAL:NEGATIVE for fever, chills, change in weight  INTEGUMENTARY/SKIN: NEGATIVE for worrisome rashes, moles or lesions  EYES: NEGATIVE for vision changes or irritation  ENT/MOUTH: NEGATIVE for ear, mouth and throat problems  RESP:NEGATIVE for significant cough or SOB  CV: NEGATIVE for chest pain,  palpitations or peripheral edema  GI: NEGATIVE for nausea, abdominal pain, heartburn, or change in bowel habits  : NEGATIVE for urinary frequency, hematuria or dysuria  MUSCULOSKELETAL: see HPI   NEURO: NEGATIVE for weakness, dizziness or paresthesias  ENDOCRINE: NEGATIVE for temperature intolerance, skin/hair changes  HEME/ALLERGY/IMMUNE: NEGATIVE for bleeding problems  PSYCHIATRIC: NEGATIVE for changes in mood or affect    Problem list and histories reviewed & adjusted, as indicated.  Additional history: as documented    PAST MEDICAL HISTORY:  Patient  has a past medical history of Anemia, Chickenpox, Endometrial polyp, and Hypothyroidism.    PAST SURGICAL HISTORY:  Patient  has a past surgical history that includes Tonsillectomy; Laparoscopic cholecystectomy; and HYSTEROSCOPY W ENDOMETRIAL BX/POLYPECTOMY W/WO D&C.    CURRENT MEDICATIONS:  Current Outpatient Medications   Medication Sig Dispense Refill     etonogestrel (NEXPLANON) 68 MG IMPL 1 each (68 mg) by Subdermal route once       levothyroxine (SYNTHROID/LEVOTHROID) 112 MCG tablet Take 1 tablet (112 mcg) by mouth daily 60 tablet 0     Prenatal Vit-Fe Fumarate-FA (PRENATAL MULTIVITAMIN W/IRON) 27-0.8 MG tablet Take 1 tablet by mouth daily 90 tablet 3     ibuprofen (ADVIL/MOTRIN) 600 MG tablet Take 1 tablet (600 mg) by mouth every 8 hours as needed for moderate pain 30 tablet 0     methylPREDNISolone (MEDROL DOSEPAK) 4 MG tablet therapy pack Follow Package Directions 21 tablet 0       ALLERGY:  No Known Allergies    IMMUNIZATION HISTORY:  Immunization History   Administered Date(s) Administered     COVID-19,PF,Pfizer (12+ Yrs) 12/14/2021, 01/13/2022     Influenza Vaccine IM > 6 months Valent IIV4 (Alfuria,Fluzone) 03/12/2021, 09/14/2021     Tdap (Adacel,Boostrix) 08/04/2021     SOCIAL HISTORY:  Patient  reports that she has never smoked. She has never used smokeless tobacco. She reports previous alcohol use. She reports that she does not use drugs.  ,  3 children 11, 8 yo. 9 months old     FAMILY HISTORY:  Patient's family history includes Diabetes in her mother; Hypertension in her mother; No Known Problems in her father; Other - See Comments in her maternal grandfather.  mother with history of polyarthritis, pending further w/u, seeing rheumatologist     Labs reviewed in EPIC    OBJECTIVE:                                                    /67 (BP Location: Left arm, Patient Position: Sitting, Cuff Size: Adult Large)   Pulse 60   Temp 98.2  F (36.8  C) (Oral)   Wt 77.7 kg (171 lb 6.4 oz)   SpO2 100%   Breastfeeding Yes   BMI 33.47 kg/m   Body mass index is 33.47 kg/m .   GENERAL: healthy, alert and no distress  EYES: Eyes grossly normal to inspection, PERRL and conjunctivae and sclerae normal  NECK: no adenopathy, no asymmetry, masses, or scars and thyroid normal to palpation  RESP: lungs clear to auscultation - no rales, rhonchi or wheezes  CV: regular rate and rhythm, normal S1 S2, no S3 or S4, no murmur, click or rub, no peripheral edema and peripheral pulses strong  ABDOMEN: soft, nontender, no hepatosplenomegaly, no masses and bowel sounds normal  MS: no gross musculoskeletal defects noted, no edema  SKIN: no suspicious lesions or rashes  NEURO: Normal strength and tone, mentation intact and speech normal  BACK: no CVA tenderness, no paralumbar tenderness  Spine: non tender   PSYCH: mentation appears normal, affect normal  LYMPH: no cervical, supraclavicular, adenopathy       ASSESSMENT AND PLAN:                                                      1. Polyarthritis   2. Lyme IgG positive , IgM negative .   - no hx of tick bites, not an outdoor person   - negative Lyme PCR ( synovial fluid in May 2022)   3. Valgus deformities ( knees)   4. CRP 20 ESR 40 (5/11/22)   5. Hypermobile joints   6. Hypothyroidism     Discussion :   Unable to attribute polyarthritis ( right knee, left knee, left foot, right shoulder) due to Lyme. Lyme PCR was negative in  synovial fluid but lyme IgG was positive on 2 occasions with negative IgM. However, no history of tick bites, or skin rashes or feeling unwell with nausea, vomiting, myalgia , athralgia in the past . Prescribed Amoxicillin for Lyme athritis of left knee by her doctor, but right knee  starts to hurt recently,  while on Amoxillin. has right shoulder pain for many years. Patient is breast feeding her baby, so amoxicillin was prescribed.      Plan/Recommendations  - repeat Lyme serology   - CMP, CBC CRP. ESR, Vitamin D   - Agree with Rheumatology referral . Her mother with history of arthritis and pending further work up.  - discussed potential side effects of antibiotics      Addendum (7/21/22)   - repeat Lyme IgG is positive again. but knees are not responding to Amoxicillin. I am concerned about possible false positive Lyme IgG. she is breast feeding her baby, so doxy is contraindicated unless she agrees to not breast feed her baby. She has a rheumatology consult next week   - knee pain could be related to ? valgus deformity of her knees / rheumatological problems ( mother has arthritis)   - vitamin D is low, recommend starting vitamin D3 2000 IU daily    I called and discussed labs and recommendations with Denise    Thank You for allowing me to participate in the care of this patient    Klaus Phillip MD, M.Med.Sc  Division of Infectious Diseases and International Medicine  [facility]      70 min spent with patient, chart review, documenation

## 2022-07-18 LAB
B BURGDOR IGG SERPL QL IA: 9 INDEX
B BURGDOR IGG SERPL QL IA: REACTIVE
B BURGDOR IGM SERPL QL IA: 0.07 INDEX
B BURGDOR IGM SERPL QL IA: NONREACTIVE
DEPRECATED CALCIDIOL+CALCIFEROL SERPL-MC: 18 UG/L (ref 20–75)

## 2022-07-21 RX ORDER — VITAMIN B COMPLEX
2 TABLET ORAL DAILY
COMMUNITY
Start: 2022-07-21 | End: 2024-04-05 | Stop reason: ALTCHOICE

## 2022-07-22 NOTE — PROGRESS NOTES
Rheumatology Clinic Visit  [facility]  RAOUL Kent MRN# [id number]   YOB: 1990 Age: 32 year old   Date of Visit: 07/22/2022  Primary care provider: Kristina [facility]          Assessment and Plan:     1.  Polyarthritis multiple sites  2.  Pain in both knees    Patient presents today for an evaluation of polyarthritis.  Her pain started in April with left sided sciatic nerve pain.  She states that she then started to have pain and swelling on the top of her left foot by the ankle.  She then started to notice pain and swelling in her left knee.  She reports having a history of swelling in her right knee having fluid removed and states that this felt the same.  She then got fluid removed and 1 week later needed to have it removed again.  She also started to notice pain in her bilateral wrists.  She had a positive Lyme's test and was given amoxicillin.  She is unable to take doxycycline as she is currently breast-feeding.  She did notice a slight improvement with the amoxicillin and that she is able to walk better however she continues to have the pain in her joints and the feelings of stiffness.  She does feel that mornings are worse however she reports that she does not have much activity so she is not sure if she was more active if she would feel worse after that.  Physical examination showed tenderness to range of motion of her bilateral knees.  No significant effusions were noted on examination today.  No synovitis, dactylitis, tenosynovitis or enthesopathy.  Previous laboratory evaluations and imaging studies were reviewed.  Please see below for results.    The exact etiology of the patient's pain is unknown.  Her laboratory studies were negative/normal for rheumatoid arthritis.  This could potentially be a seronegative rheumatoid arthritis however it is interesting that it is mostly in her knees.  She has started to notice more wrist pain, therefore  we will get imaging studies of her wrist.  No specific labs need to be performed today.  She has seen infectious disease and the thought is that she could potentially be having a false positive Lyme test.  We did discuss potential treatment options as if we were treating this like an inflammatory arthritis such as hydroxychloroquine given that she is still breast-feeding.  Her baby is currently in treatment for leukemia therefore I would want her babies medical providers approval prior to prescribing this medication.  On MRI there was also question about possibly PVNS.  Therefore we will refer her to orthopedic oncology for their opinion.  I also reviewed the findings of her synovial fluid analysis.  The analysis did show a white blood cell count greater than 6000, however in rheumatoid arthritis there is typically a neutrophil predominance of 60 to 80% whereas her results showed a lymphocyte predominance at 46% and neutrophil coming in at 38%. We did discuss potentially trying other Medrol Dosepak however the patient feels that things are currently manageable, therefore we will hold off on that at this time.    Plan:     1. Schedule follow-up with Bre Chanel PA-C after orthopedic appointment.   2. Imaging: xray bilateral wrists  3. Medication recommendations:   a. Talk with your baby's providers about potentially starting Hydroxychloroquine  b. Let me know if you need another course of Medrol    Bre Chanel, RAOUL  Rheumatology         History of Present Illness:   Denise Cazares presents for evaluation of polyarthralgia.      She states that in April, she started to have pain in her left sciatic nerve. She then started to have pain on the top of her left foot by the ankle. She has a daughter with leukemia and was at the hospital. She then started to have her left knee swell. She had previously had swelling in ehr right knee with fluid removed. She got the liquid removed on the left. 1 week later, she  "needed it removed again. She then started to have pain in her wrists. She had a positive lyme and was given amoxicillin. She saw infectious disease but was told they were uncertain if she had lyme. 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 She had further blood showing antibodies for lyme. She feels that both of her knees are swollen and the bottom of her heel. She has pain when she first stands up on the heel and that resolves after walking a few steps. She states that it is hard to bend her knees in the morning. She also has hypermobile joints as well and there is question on if this is causing \"wear and tear\". She has seen orthopedics and PT was recommended. She is able to walk better since completing the antibiotic, but she continues to feel that there is a lot of swelling in her joints. She feels that she is worse in the mornings after periods of rest. No skin rashes. She has some fatigue, but she feels this is related to all the stress with her daughter.     Her mother complains about pain in her hands but is unsure if there is RA. No family history of lupus. No personal or family history of psoriasis, ulcerative colitis or crohn's.          Review of Systems:     Constitutional: negative  Skin: negative  Eyes: negative  Ears/Nose/Throat: negative  Respiratory: No shortness of breath, dyspnea on exertion, cough, or hemoptysis  Cardiovascular: negative  Gastrointestinal: negative  Genitourinary: negative  Musculoskeletal: as above  Neurologic: negative  Psychiatric: negative  Hematologic/Lymphatic/Immunologic: negative  Endocrine: negative         Active Problem List:     Patient Active Problem List    Diagnosis Date Noted     Nexplanon in place 12/14/2021 LT Arm 12/14/2021     Priority: Medium     Nexplanon placed 12/14/2021  LOT# [id number]  Exp: 04/09/2024  NDC# 23296-397-66    Dinora Morrison on 12/14/2021 at 9:53 AM           Class 2 obesity in adult 10/12/2021     Priority: Medium     Right " knee pain 02/26/2021     Priority: Medium     Hypothyroidism      Priority: Medium            Past Medical History:     Past Medical History:   Diagnosis Date     Anemia      Chickenpox      Endometrial polyp      Hypothyroidism      Past Surgical History:   Procedure Laterality Date     AS HYSTEROSCOPY W ENDOMETRIAL BX/POLYPECTOMY W/WO D&C       LAPAROSCOPIC CHOLECYSTECTOMY       TONSILLECTOMY              Social History:     Social History     Socioeconomic History     Marital status:      Spouse name: Not on file     Number of children: Not on file     Years of education: Not on file     Highest education level: Not on file   Occupational History     Not on file   Tobacco Use     Smoking status: Never Smoker     Smokeless tobacco: Never Used   Vaping Use     Vaping Use: Never used   Substance and Sexual Activity     Alcohol use: Not Currently     Comment: occasionally-quit with pregnnacy     Drug use: Never     Sexual activity: Yes     Partners: Male     Birth control/protection: None     Comment: Dominick   Other Topics Concern     Not on file   Social History Narrative     Not on file     Social Determinants of Health     Financial Resource Strain: Not on file   Food Insecurity: Not on file   Transportation Needs: Not on file   Physical Activity: Not on file   Stress: Not on file   Social Connections: Not on file   Intimate Partner Violence: Not on file   Housing Stability: Not on file          Family History:     Family History   Problem Relation Age of Onset     Diabetes Mother      Hypertension Mother      No Known Problems Father      Other - See Comments Maternal Grandfather         MVA            Allergies:   No Known Allergies         Medications:     Current Outpatient Medications   Medication Sig Dispense Refill     etonogestrel (NEXPLANON) 68 MG IMPL 1 each (68 mg) by Subdermal route once       ibuprofen (ADVIL/MOTRIN) 600 MG tablet Take 1 tablet (600 mg) by mouth every 8 hours as needed for  moderate pain 30 tablet 0     levothyroxine (SYNTHROID/LEVOTHROID) 112 MCG tablet Take 1 tablet (112 mcg) by mouth daily 60 tablet 0     methylPREDNISolone (MEDROL DOSEPAK) 4 MG tablet therapy pack Follow Package Directions 21 tablet 0     Prenatal Vit-Fe Fumarate-FA (PRENATAL MULTIVITAMIN W/IRON) 27-0.8 MG tablet Take 1 tablet by mouth daily 90 tablet 3     Vitamin D3 (CHOLECALCIFEROL) 25 mcg (1000 units) tablet Take 2 tablets (50 mcg) by mouth daily              Physical Exam:   currently breastfeeding.  Wt Readings from Last 6 Encounters:   07/15/22 77.7 kg (171 lb 6.4 oz)   05/23/22 75.8 kg (167 lb)   05/20/22 75.8 kg (167 lb)   05/16/22 76.2 kg (167 lb 14.4 oz)   05/11/22 78 kg (172 lb)   05/03/22 75.8 kg (167 lb)     Constitutional: well-developed, appearing stated age; cooperative  Eyes: nl EOM, PERRLA, vision, conjunctiva, sclera  ENT: nl external ears, nose, hearing, lips, teeth, gums, throat. No mucositis.   No mucous membrane lesions, normal saliva pool  Neck: no mass or thyroid enlargement  Resp: lungs clear to auscultation  CV: RRR, no murmurs, rubs or gallops, no edema  Lymph: no cervical, supraclavicular or epitrochlear nodes  MS: The TMJ,  shoulder, elbow, wrist, MCP/PIP/DIP, hip, knee, ankle, and foot MTP/IP joints were examined.  No palpable knee joint effusion on examination.  She does however have pain with range of motion of the bilateral knees, left greater than right.  She does continue however to have full range of motion of the bilateral knees.  No active synovitis or altered joint anatomy. Normal  strength. No dactylitis,  tenosynovitis, enthesopathy.   Skin: no nail pitting, alopecia, rash, nodules or lesions.   Neuro: nl cranial nerves.   Psych: nl judgement, orientation, memory, affect.           Data:   Imaging:  MRI left knee 5/31/2022  Impression:   1. Nodular intra-articular foci as detailed above. Differential would   include infectious or inflammatory etiology. Nodular  synovitis and/or   PVNS are also included in the differential. Consider referral to   orthopedic oncology at the [facility].     2. Repeat MRI with gadolinium and susceptibility weighted imaging   could be considered in 3-6 months.     Xray left foot 5/3/2022  IMPRESSION: No fractures are evident. Normal joint spacing and  alignment. The soft tissues are unremarkable.     Laboratory:  5/11/2022  Uric acid 2.1  Rheumatoid factor 7  YOSELIN negative    5/18/2022  CCP antibody 2.6  CBC within normal parameters    6/24/2022  TSH 3.9, T4 0.76    7/15/2022  Creatinine 0.56, GFR greater than 80  ALT 32, AST 20  CRP 6.3  Vitamin D 18  Sed rate 22  LYme confirm IgG reactive, 9.0      Fluid analysis: 5/16/2022: 6K WBC, 38% PMNs, 36% lymphocytes. Cultures on broth showed likely contaminate of diptheroids, globicatella sanuinis. Lyme analysis showed reactive IgG

## 2022-07-25 ENCOUNTER — OFFICE VISIT (OUTPATIENT)
Dept: RHEUMATOLOGY | Facility: CLINIC | Age: 32
End: 2022-07-25
Attending: FAMILY MEDICINE
Payer: COMMERCIAL

## 2022-07-25 ENCOUNTER — ANCILLARY PROCEDURE (OUTPATIENT)
Dept: GENERAL RADIOLOGY | Facility: CLINIC | Age: 32
End: 2022-07-25
Attending: PHYSICIAN ASSISTANT
Payer: COMMERCIAL

## 2022-07-25 VITALS
WEIGHT: 171 LBS | HEIGHT: 60 IN | BODY MASS INDEX: 33.57 KG/M2 | DIASTOLIC BLOOD PRESSURE: 60 MMHG | SYSTOLIC BLOOD PRESSURE: 104 MMHG

## 2022-07-25 DIAGNOSIS — M13.0 POLYARTHRITIS OF MULTIPLE SITES: ICD-10-CM

## 2022-07-25 DIAGNOSIS — E03.9 HYPOTHYROIDISM, UNSPECIFIED TYPE: ICD-10-CM

## 2022-07-25 DIAGNOSIS — M25.561 PAIN IN BOTH KNEES, UNSPECIFIED CHRONICITY: ICD-10-CM

## 2022-07-25 DIAGNOSIS — M13.0 POLYARTHRITIS OF MULTIPLE SITES: Primary | ICD-10-CM

## 2022-07-25 DIAGNOSIS — M25.562 PAIN IN BOTH KNEES, UNSPECIFIED CHRONICITY: ICD-10-CM

## 2022-07-25 PROCEDURE — 73110 X-RAY EXAM OF WRIST: CPT | Mod: TC | Performed by: RADIOLOGY

## 2022-07-25 PROCEDURE — 99204 OFFICE O/P NEW MOD 45 MIN: CPT | Performed by: PHYSICIAN ASSISTANT

## 2022-07-25 NOTE — PATIENT INSTRUCTIONS
After Visit Instructions:     Thank you for coming to [facility] for your care. It is my goal to partner with you to help you reach your optimal state of health.       Plan:     Schedule follow-up with Bre Chanel PA-C after orthopedic appointment.   Imaging: xray bilateral wrists  Medication recommendations:   Talk with your baby's providers about potentially starting Hydroxychloroquine  Let me know if you need another course of Medrol        Bre Chanel PA-C  [facility]  [facility]    Contact information: [facility]  Clinic Number:  [phone]  Please call or send a [employer] message with any questions about your care

## 2022-08-02 NOTE — TELEPHONE ENCOUNTER
Diagnoses: hypothyroidism    DATE RECEIVED: 9.26.22   NOTES (FOR ALL VISITS) STATUS DETAILS   OFFICE NOTES from referring provider Internal 5.12.22, 9.3.21, 8.17.21, 8.4.21,7.19.21 JUDD Storey  [facility]   OFFICE NOTES from other specialist     ED NOTES     OPERATIVE REPORT  (thyroid, pituitary, adrenal, parathyroid)     MEDICATION LIST Internal    IMAGING      DEXASCAN     MRI (BRAIN)     XR (Chest)     CT (HEAD/NECK/CHEST/ABDOMEN)     NUCLEAR      ULTRASOUND (HEAD/NECK)     LABS     DIABETES: HBGA1C, CREATININE, FASTING LIPIDS, MICROALBUMIN URINE, POTASSIUM, TSH, T4    THYROID: TSH, T4, CBC, THYRODLONULIN, TOTAL T3, FREE T4, CALCITONIN, CEA Internal

## 2022-08-03 NOTE — TELEPHONE ENCOUNTER
DIAGNOSIS: PVNS // referred by Bre Chanel PA-C   APPOINTMENT DATE: 08/11/2022   NOTES STATUS DETAILS   OFFICE NOTE from referring provider Internal 07/25/2022 - Bre Chanel PA-C - [facility]     OFFICE NOTE from other specialist Internal 05/23/2022, 05/16/2022, 02/10/2021 - Forrest Jerome MD - [facility]    05/11/2022 - Janet Collins MD - [facility]    05/11/2022 - Biju Campbell MD - [facility]    02/05/2021 - Alejandro Jarrett MD - [facility]     DISCHARGE REPORT from the ER Internal 05/20/2022 - [facility] ED  05/03/2022 - [facility] ED   MEDICATION LIST Internal    LABS     CBC/DIFF Internal 05/18/2022   MRI Internal 05/31/2022 - LT Knee   XRAYS (IMAGES & REPORTS) Internal 05/20/2022, 05/16/2022 - LT Knee  05/03/2022 - LT Foot  02/10/2021 - RT Knee

## 2022-08-10 NOTE — PROGRESS NOTES
[facility], Orthopaedic Oncology Surgery Consultation  by Meng Arce M.D.    Denise Cazares MRN# [id number]    YOB: 1990     Requesting physician: [facility]  Forrest Jerome MD Schoeberl, Megan Joan PA-C              Assessment and Plan:   Assessment:  Intra-articular mass with effusion of left knee joint with a history below is highly suggestive of either synovial based diseases such as connective tissue arthritides or focal disease such as pigmented villonodular synovitis or synovial chondromatosis, liposarcoma of recent's, other intra-articular derangement.      Patient also has physical findings involving her right knee however no imaging has been performed.  I will arrange for x-rays and MRI examination.     Plan:  Await findings of right knee x-ray and MRI examination.  We will also arrange for work-up of systemic arthritides including YOSELIN, rheumatoid factor, ESR, CRP.    Regarding the left knee joint, it is likely that she will need an arthroscopic examination of the left knee joint with synovial biopsy.  I explained the risk benefits alternatives to the procedure and the rationale for this recommendation.      We will schedule after seeing patient in follow-up and reviewing results of the right knee MRI examination and laboratory investigations.  I will plan on seeing the patient for follow-up visit after completion of her work-up.    Meng Arce MD  [facility] Professor  Oncology and Adult Reconstructive Surgery  Dept Orthopaedic Surgery, [facility]  [phone] office, [phone] pager  [email or web address]             History of Present Illness:   32 year old female  chief complaint    This patient is seen at the request of Forrest Norman, orthopedist, for evaluation of her left knee.  Patient states that a year ago she was having problems with the right knee joint which since resolved.  Then she began experiencing  what she thought were sciatic type symptoms in her left lower extremity and she misstepped and afterwards noticed swelling of her contralateral left knee in May 2022.  Since that time she had an aspiration performed twice as well as an MRI examination demonstrating abnormalities resulting in today's referral.  She notes swelling with pain and occasional buckling.    Work-up to date has included Lyme test which was negative, and aspiration White blood count 6030, negative crystals, neutrophil count 38%, culture has Globicatella sanguinis on day 2 broth only culture.  No prior surgeries in the left knee or injury.    Current symptoms:  Problem: pain in knees, feels like cant bend them  Onset and duration: 5/2022  Awakens from sleep due to sx's:  No  Precipitating Injury:  No    Other joints or sites painful:  Yes, Left heel  Fever: No  Appetite change or weight loss: No  History of prior or existing cancer: No           Physical Exam:     EXAMINATION pertinent findings:   PSYCH: Pleasant, healthy-appearing, alert, oriented x3, cooperative. Normal mood and affect.  VITAL SIGNS: currently breastfeeding..  Reviewed nursing intake notes.   There is no height or weight on file to calculate BMI.  RESP: non labored breathing   ABD: benign, soft, non-tender, no acute peritoneal findings  SKIN: grossly normal   LYMPHATIC: grossly normal, no adenopathy, no extremity edema  NEURO: grossly normal , no motor deficits  VASCULAR: satisfactory perfusion of all extremities   MUSCULOSKELETAL:   Her gait is normal.  No limp.  Hip range of motion full and symmetric bilaterally.      Right knee has large effusion with fluid wave and ballotable patella.  Full extension and further flexion to 135 degrees.  2+ opening to the MCL.  Lateral collateral ligament is tight.  Cruciate ligaments without laxity.    Left knee demonstrates no effusion.  Zero - 235 degree range of motion.  No opening to the MCL or LCL or cruciate ligament laxity.  No  focal tenderness.           Data:   All laboratory data reviewed  All imaging studies reviewed by me    MRI examination of the left knee reveals evidence of intra-articular nodular masses, these are present in the intercondylar region as well as behind the PCL and and suprapatellar region.      DATA for DOCUMENTATION:         Past Medical History:     Patient Active Problem List   Diagnosis     Right knee pain     Hypothyroidism     Class 2 obesity in adult     Nexplanon in place 12/14/2021 LT Arm     Past Medical History:   Diagnosis Date     Anemia      Chickenpox      Endometrial polyp      Hypothyroidism        Also see scanned health assessment forms.       Past Surgical History:     Past Surgical History:   Procedure Laterality Date     AS HYSTEROSCOPY W ENDOMETRIAL BX/POLYPECTOMY W/WO D&C       LAPAROSCOPIC CHOLECYSTECTOMY       TONSILLECTOMY              Social History:     Social History     Socioeconomic History     Marital status:      Spouse name: Not on file     Number of children: Not on file     Years of education: Not on file     Highest education level: Not on file   Occupational History     Not on file   Tobacco Use     Smoking status: Never Smoker     Smokeless tobacco: Never Used   Vaping Use     Vaping Use: Never used   Substance and Sexual Activity     Alcohol use: Not Currently     Comment: occasionally-quit with pregnnacy     Drug use: Never     Sexual activity: Yes     Partners: Male     Birth control/protection: None     Comment: Dominick   Other Topics Concern     Not on file   Social History Narrative     Not on file     Social Determinants of Health     Financial Resource Strain: Not on file   Food Insecurity: Not on file   Transportation Needs: Not on file   Physical Activity: Not on file   Stress: Not on file   Social Connections: Not on file   Intimate Partner Violence: Not on file   Housing Stability: Not on file            Family History:       Family History   Problem Relation  Age of Onset     Diabetes Mother      Hypertension Mother      No Known Problems Father      Other - See Comments Maternal Grandfather         MVA            Medications:     Current Outpatient Medications   Medication Sig     etonogestrel (NEXPLANON) 68 MG IMPL 1 each (68 mg) by Subdermal route once     ibuprofen (ADVIL/MOTRIN) 600 MG tablet Take 1 tablet (600 mg) by mouth every 8 hours as needed for moderate pain     levothyroxine (SYNTHROID/LEVOTHROID) 112 MCG tablet Take 1 tablet (112 mcg) by mouth daily     methylPREDNISolone (MEDROL DOSEPAK) 4 MG tablet therapy pack Follow Package Directions     Prenatal Vit-Fe Fumarate-FA (PRENATAL MULTIVITAMIN W/IRON) 27-0.8 MG tablet Take 1 tablet by mouth daily     Vitamin D3 (CHOLECALCIFEROL) 25 mcg (1000 units) tablet Take 2 tablets (50 mcg) by mouth daily     Current Facility-Administered Medications   Medication     bupivacaine (MARCAINE) 0.25 % injection 4 mL              Review of Systems:   A comprehensive 10 point review of systems (constitutional, ENT, cardiac, peripheral vascular, lymphatic, respiratory, GI, , Musculoskeletal, skin, Neurological) was performed and found to be negative except as described in this note.     See intake form completed by patient

## 2022-08-11 ENCOUNTER — OFFICE VISIT (OUTPATIENT)
Dept: ORTHOPEDICS | Facility: CLINIC | Age: 32
End: 2022-08-11
Payer: COMMERCIAL

## 2022-08-11 ENCOUNTER — LAB (OUTPATIENT)
Dept: LAB | Facility: CLINIC | Age: 32
End: 2022-08-11
Payer: COMMERCIAL

## 2022-08-11 ENCOUNTER — PRE VISIT (OUTPATIENT)
Dept: ORTHOPEDICS | Facility: CLINIC | Age: 32
End: 2022-08-11

## 2022-08-11 VITALS — BODY MASS INDEX: 34.95 KG/M2 | WEIGHT: 178 LBS | HEIGHT: 60 IN

## 2022-08-11 DIAGNOSIS — M13.0 POLYARTHRITIS OF MULTIPLE SITES: ICD-10-CM

## 2022-08-11 LAB
CRP SERPL-MCNC: <2.9 MG/L (ref 0–8)
ERYTHROCYTE [SEDIMENTATION RATE] IN BLOOD BY WESTERGREN METHOD: 15 MM/HR (ref 0–20)

## 2022-08-11 PROCEDURE — 85652 RBC SED RATE AUTOMATED: CPT | Performed by: PATHOLOGY

## 2022-08-11 PROCEDURE — 86038 ANTINUCLEAR ANTIBODIES: CPT | Performed by: ORTHOPAEDIC SURGERY

## 2022-08-11 PROCEDURE — 86140 C-REACTIVE PROTEIN: CPT | Performed by: PATHOLOGY

## 2022-08-11 PROCEDURE — 99213 OFFICE O/P EST LOW 20 MIN: CPT | Performed by: ORTHOPAEDIC SURGERY

## 2022-08-11 PROCEDURE — 86431 RHEUMATOID FACTOR QUANT: CPT | Performed by: ORTHOPAEDIC SURGERY

## 2022-08-11 PROCEDURE — 36415 COLL VENOUS BLD VENIPUNCTURE: CPT | Performed by: PATHOLOGY

## 2022-08-11 NOTE — LETTER
8/11/2022         RE: Denise Cazares  [address]  [place] [address]        Dear Colleague,    Thank you for referring your patient, Denise Cazares, to the [facility]. Please see a copy of my visit note below.        [facility], Orthopaedic Oncology Surgery Consultation  by Meng Arce M.D.    Denise Cazares MRN# [id number]    YOB: 1990     Requesting physician: Referred Self  Clinic - [facility]  Forrest Jerome MD Schoeberl, Megan Joan PA-C              Assessment and Plan:   Assessment:  Intra-articular mass with effusion of left knee joint with a history below is highly suggestive of either synovial based diseases such as connective tissue arthritides or focal disease such as pigmented villonodular synovitis or synovial chondromatosis, liposarcoma of recent's, other intra-articular derangement.      Patient also has physical findings involving her right knee however no imaging has been performed.  I will arrange for x-rays and MRI examination.     Plan:  Await findings of right knee x-ray and MRI examination.  We will also arrange for work-up of systemic arthritides including YOSELIN, rheumatoid factor, ESR, CRP.    Regarding the left knee joint, it is likely that she will need an arthroscopic examination of the left knee joint with synovial biopsy.  I explained the risk benefits alternatives to the procedure and the rationale for this recommendation.      We will schedule after seeing patient in follow-up and reviewing results of the right knee MRI examination and laboratory investigations.  I will plan on seeing the patient for follow-up visit after completion of her work-up.    MD [facility] Professor  Oncology and Adult Reconstructive Surgery  Dept Orthopaedic Surgery, [facility]  [phone] office, [phone] pager  [email or web address]             History of Present Illness:    32 year old female  chief complaint    This patient is seen at the request of Forrest Norman, orthopedist, for evaluation of her left knee.  Patient states that a year ago she was having problems with the right knee joint which since resolved.  Then she began experiencing what she thought were sciatic type symptoms in her left lower extremity and she misstepped and afterwards noticed swelling of her contralateral left knee in May 2022.  Since that time she had an aspiration performed twice as well as an MRI examination demonstrating abnormalities resulting in today's referral.  She notes swelling with pain and occasional buckling.    Work-up to date has included Lyme test which was negative, and aspiration White blood count 6030, negative crystals, neutrophil count 38%, culture has Globicatella sanguinis on day 2 broth only culture.  No prior surgeries in the left knee or injury.    Current symptoms:  Problem: pain in knees, feels like cant bend them  Onset and duration: 5/2022  Awakens from sleep due to sx's:  No  Precipitating Injury:  No    Other joints or sites painful:  Yes, Left heel  Fever: No  Appetite change or weight loss: No  History of prior or existing cancer: No           Physical Exam:     EXAMINATION pertinent findings:   PSYCH: Pleasant, healthy-appearing, alert, oriented x3, cooperative. Normal mood and affect.  VITAL SIGNS: currently breastfeeding..  Reviewed nursing intake notes.   There is no height or weight on file to calculate BMI.  RESP: non labored breathing   ABD: benign, soft, non-tender, no acute peritoneal findings  SKIN: grossly normal   LYMPHATIC: grossly normal, no adenopathy, no extremity edema  NEURO: grossly normal , no motor deficits  VASCULAR: satisfactory perfusion of all extremities   MUSCULOSKELETAL:   Her gait is normal.  No limp.  Hip range of motion full and symmetric bilaterally.      Right knee has large effusion with fluid wave and ballotable patella.  Full extension  and further flexion to 135 degrees.  2+ opening to the MCL.  Lateral collateral ligament is tight.  Cruciate ligaments without laxity.    Left knee demonstrates no effusion.  Zero - 235 degree range of motion.  No opening to the MCL or LCL or cruciate ligament laxity.  No focal tenderness.           Data:   All laboratory data reviewed  All imaging studies reviewed by me    MRI examination of the left knee reveals evidence of intra-articular nodular masses, these are present in the intercondylar region as well as behind the PCL and and suprapatellar region.      DATA for DOCUMENTATION:         Past Medical History:     Patient Active Problem List   Diagnosis     Right knee pain     Hypothyroidism     Class 2 obesity in adult     Nexplanon in place 12/14/2021 LT Arm     Past Medical History:   Diagnosis Date     Anemia      Chickenpox      Endometrial polyp      Hypothyroidism        Also see scanned health assessment forms.       Past Surgical History:     Past Surgical History:   Procedure Laterality Date     AS HYSTEROSCOPY W ENDOMETRIAL BX/POLYPECTOMY W/WO D&C       LAPAROSCOPIC CHOLECYSTECTOMY       TONSILLECTOMY              Social History:     Social History     Socioeconomic History     Marital status:      Spouse name: Not on file     Number of children: Not on file     Years of education: Not on file     Highest education level: Not on file   Occupational History     Not on file   Tobacco Use     Smoking status: Never Smoker     Smokeless tobacco: Never Used   Vaping Use     Vaping Use: Never used   Substance and Sexual Activity     Alcohol use: Not Currently     Comment: occasionally-quit with pregnnacy     Drug use: Never     Sexual activity: Yes     Partners: Male     Birth control/protection: None     Comment: Dominick   Other Topics Concern     Not on file   Social History Narrative     Not on file     Social Determinants of Health     Financial Resource Strain: Not on file   Food Insecurity: Not on  file   Transportation Needs: Not on file   Physical Activity: Not on file   Stress: Not on file   Social Connections: Not on file   Intimate Partner Violence: Not on file   Housing Stability: Not on file            Family History:       Family History   Problem Relation Age of Onset     Diabetes Mother      Hypertension Mother      No Known Problems Father      Other - See Comments Maternal Grandfather         MVA            Medications:     Current Outpatient Medications   Medication Sig     etonogestrel (NEXPLANON) 68 MG IMPL 1 each (68 mg) by Subdermal route once     ibuprofen (ADVIL/MOTRIN) 600 MG tablet Take 1 tablet (600 mg) by mouth every 8 hours as needed for moderate pain     levothyroxine (SYNTHROID/LEVOTHROID) 112 MCG tablet Take 1 tablet (112 mcg) by mouth daily     methylPREDNISolone (MEDROL DOSEPAK) 4 MG tablet therapy pack Follow Package Directions     Prenatal Vit-Fe Fumarate-FA (PRENATAL MULTIVITAMIN W/IRON) 27-0.8 MG tablet Take 1 tablet by mouth daily     Vitamin D3 (CHOLECALCIFEROL) 25 mcg (1000 units) tablet Take 2 tablets (50 mcg) by mouth daily     Current Facility-Administered Medications   Medication     bupivacaine (MARCAINE) 0.25 % injection 4 mL              Review of Systems:   A comprehensive 10 point review of systems (constitutional, ENT, cardiac, peripheral vascular, lymphatic, respiratory, GI, , Musculoskeletal, skin, Neurological) was performed and found to be negative except as described in this note.     See intake form completed by patient

## 2022-08-12 ENCOUNTER — TELEPHONE (OUTPATIENT)
Dept: ORTHOPEDICS | Facility: CLINIC | Age: 32
End: 2022-08-12

## 2022-08-12 LAB
ANA PAT SER IF-IMP: ABNORMAL
ANA SER QL IF: ABNORMAL
ANA TITR SER IF: ABNORMAL {TITER}
RHEUMATOID FACT SER NEPH-ACNC: 8 IU/ML

## 2022-08-14 ENCOUNTER — ANCILLARY PROCEDURE (OUTPATIENT)
Dept: MRI IMAGING | Facility: CLINIC | Age: 32
End: 2022-08-14
Attending: ORTHOPAEDIC SURGERY
Payer: COMMERCIAL

## 2022-08-14 DIAGNOSIS — M13.0 POLYARTHRITIS OF MULTIPLE SITES: ICD-10-CM

## 2022-08-14 PROCEDURE — 73723 MRI JOINT LWR EXTR W/O&W/DYE: CPT | Mod: RT | Performed by: RADIOLOGY

## 2022-08-14 PROCEDURE — A9585 GADOBUTROL INJECTION: HCPCS | Performed by: RADIOLOGY

## 2022-08-14 RX ORDER — GADOBUTROL 604.72 MG/ML
7.5 INJECTION INTRAVENOUS ONCE
Status: COMPLETED | OUTPATIENT
Start: 2022-08-14 | End: 2022-08-14

## 2022-08-14 RX ADMIN — GADOBUTROL 7.5 ML: 604.72 INJECTION INTRAVENOUS at 11:59

## 2022-09-09 ENCOUNTER — LACTATION ENCOUNTER (OUTPATIENT)
Age: 32
End: 2022-09-09

## 2022-09-09 NOTE — LACTATION NOTE
This note was copied from a baby's chart.  Met with Denise today as they were preparing to change rooms. She shares her concern with nipple pain has started with Ely's steroids and increased appetite, she wants to nurse more for comfort and particularly the pain happens when she's falling asleep, slides off breast and when Denise tries to break seal as this gets painful Ely wakes up and re-latches right away, sometimes not a great latch just wants to suck for comfort. Now that she has teeth it's harder to tuck more in and to break that seal when she's fallen asleep. Yesterday Denise reports her right nipple was red and surface scabs, today on exam is uniformly normal pink/tan like areola with small spot on nipple face that is lighter pink but no more abrasion or tissue damage visible; mom says it feels better today too after giving it a break last night.     Discussed possibility of chemotherapy medications that could cause sores in Ely's mouth that could contribute to different suck but mom reports zero concerns thus far with this round of chemo for mouth sores or yeast or bacterial concerns in Ely's mouth. (Last round she did end up with mouth sores later in course.) Offered ideas and options to alternate one breast offered per day to give the other a break but also ways to try to get her on deeper to start and use good support to keep her there, ways to quickly break seal and try finger in mouth instead of back to breast (mom says she likely won't accept), lastly option of nipple shield use to help give barrier temporarily to see if that improves comfort level if Ely will take it. Reviewed options for relief between feeds, she's been using cold packs and lanolin thus far, asked about gel pads.  Brought her gel pads and a 20 mm and 24 mm nipple shield and gave instructions for use. Offer support and encouragement, answered questions.

## 2022-09-15 ENCOUNTER — LACTATION ENCOUNTER (OUTPATIENT)
Age: 32
End: 2022-09-15

## 2022-09-15 NOTE — LACTATION NOTE
This note was copied from a baby's chart.  Follow up visit, Denise reports her nipple pain is much improved after using the Medela gel pads for a few days. She puts them on for an hour or two after nursing until the cooling is no longer notable then removes them. Reviewed breast care while using them and to change them out daily (recommend discard after 24 hours use. Encouraged to ask for more when needed, they can be ordered from [facility].     Daisy says Ely is much better about pulling off and turning head, falling asleep now not trying to latch again shallow. She is relieved they're feeling better and didn't need the nipple shields. She did try one initially, but Ely didn't like it.     Addendum to first lactation note last week, I missed noting that Denise is no longer pumping. She feeds for comfort and mostly just at night, so maintains just enough for their night time routine.

## 2022-09-25 ENCOUNTER — HEALTH MAINTENANCE LETTER (OUTPATIENT)
Age: 32
End: 2022-09-25

## 2022-09-26 ENCOUNTER — VIRTUAL VISIT (OUTPATIENT)
Dept: ENDOCRINOLOGY | Facility: CLINIC | Age: 32
End: 2022-09-26
Attending: OBSTETRICS & GYNECOLOGY
Payer: COMMERCIAL

## 2022-09-26 ENCOUNTER — PRE VISIT (OUTPATIENT)
Dept: ENDOCRINOLOGY | Facility: CLINIC | Age: 32
End: 2022-09-26

## 2022-09-26 ENCOUNTER — TELEPHONE (OUTPATIENT)
Dept: ENDOCRINOLOGY | Facility: CLINIC | Age: 32
End: 2022-09-26

## 2022-09-26 DIAGNOSIS — E03.9 HYPOTHYROIDISM, UNSPECIFIED TYPE: ICD-10-CM

## 2022-09-26 PROCEDURE — 99204 OFFICE O/P NEW MOD 45 MIN: CPT | Mod: 95 | Performed by: INTERNAL MEDICINE

## 2022-09-26 RX ORDER — LEVOTHYROXINE SODIUM 125 UG/1
125 TABLET ORAL DAILY
COMMUNITY
Start: 2022-08-27 | End: 2022-10-03

## 2022-09-26 NOTE — PROGRESS NOTES
ENDOCRINOLOGY VIDEO VISIT NEW        HISTORY OF PRESENT ILLNESS    Denise Cazares is a 32 year old female who is being evaluated via a billable video visit. The patient is seen in consultation at the request of Dr. Storey for hypothyroidism.    Ms. Cazares recalls hypothyroidism was diagnosed in , during her pregnancy with first child.  Later, while getting care in [place], she recalls being told she has Hashimoto's thyroiditis.  She has been on and off levothyroxine she since then, with interruption in medication due to insurance loss.    She delivered her youngest child in 10/2021. She was on levothyroxine 125 mcg daily during pregnancy, reduced to 100 mcg daily after delivery and dose was increased to 112 mcg daily in 2022. However, patient ran out of prescription for 112 mcg dose and has been filling old prescription of 125 mcg.  She has been on 125 mcg dose for approximately 2 weeks.    Unfortunately, her youngest child was diagnosed with leukemia and is undergoing chemotherapy.  Considering circumstances, the patient feels her energy is fairly good.  She has had longstanding constipation, has bowel movements every 2 days or so and this is stable. Feels warmer than usual. Has had marked hair loss in the past year.    She is breastfeeding: Has noted reduce milk supply once her daughter started nursing less vigorously in recent months.    At times feels a fullness in her anterior neck, more on right side.  No dysphagia, hoarseness, or stridor.     (1 pregnancy ended with blighted ovum).  Has Nexplanon in place and is not having any menstrual bleeding on Nexplanon.  Does not plan to conceive in the future.    No biotin supplement use.  No excessive head or neck radiation exposure or history of neck surgery.  No family history of thyroid disease.    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  Youngest daughter has been diagnosed  with leukemia and is undergoing chemotherapy.  Lives in [place].  Insurance coverage may be an issue once again in the future.    PAST MEDICAL HISTORY  Past Medical History:   Diagnosis Date     Anemia      Chickenpox      Endometrial polyp      Hypothyroidism        MEDICATIONS  Current Outpatient Medications   Medication Sig Dispense Refill     etonogestrel (NEXPLANON) 68 MG IMPL 1 each (68 mg) by Subdermal route once       EUTHYROX 125 MCG tablet Take 125 mcg by mouth daily       levothyroxine (SYNTHROID/LEVOTHROID) 112 MCG tablet Take 1 tablet (112 mcg) by mouth daily 60 tablet 0     Prenatal Vit-Fe Fumarate-FA (PRENATAL MULTIVITAMIN W/IRON) 27-0.8 MG tablet Take 1 tablet by mouth daily 90 tablet 3     Vitamin D3 (CHOLECALCIFEROL) 25 mcg (1000 units) tablet Take 2 tablets (50 mcg) by mouth daily         Allergies, family, and social history were reviewed and documented as needed in EHR.     REVIEW OF SYSTEMS  A focused ROS was performed, with pertinent positives and negatives as noted in the HPI.    PHYSICAL EXAM  There were no vitals taken for this visit.  There is no height or weight on file to calculate BMI.  Constitutional: Patient is alert, oriented and appears in no acute distress.  Eyes: Eyes grossly normal to inspection, EOMI, no stare, lid lag, or retraction; no conjunctival injection.  ENMT: Lips are without lesions.   Neck: No visible goiter or neck mass.  Respiratory: No audible wheeze or cough. No visible cyanosis. No visible increased work of breathing.  Neurological: Alert and oriented times 3.  Cranial nerves grossly intact.      DATA REVIEW  Each of the following laboratory and/or imaging studies were reviewed.    Component      Latest Ref Rng & Units 5/11/2022 6/24/2022   TSH      0.40 - 4.00 mU/L 12.39 (H) 3.90   T4 Free      0.76 - 1.46 ng/dL 0.80 0.76     Component      Latest Ref Rng & Units 3/9/2022   TSH      0.40 - 4.00 mU/L 0.28 (L)   T4 Free      0.76 - 1.46 ng/dL 1.12        ASSESSMENT  1.  Hypothyroidism.  With prior diagnosis of Hashimoto's thyroiditis.  Clinically does not have overt symptoms of hypo- or hyperthyroidism.  Would check thyroid function tests now, recognizing that she has been on a higher dose of levothyroxine for the past 2 weeks.  Based on results, we will decide whether best to continue on levothyroxine 125 mcg daily or reduce dose back to 112 mcg daily.  She is taking levothyroxine in a manner that maximizes its absorption.  May have a change in insurance status/loss of insurance: We discussed generic levothyroxine is available at a more moderate cost at certain pharmacies and I have asked her to keep me updated if insurance status changes.  We reviewed pathophysiology of Hashimoto's thyroiditis also.    2.  Hair loss.  Likely due to postpartum status and recent variation in thyroid hormone levels.  Address hypothyroidism as above.  We discussed that hair loss should hopefully improve over the coming several months as thyroid status stabilizes.  She has been contemplating starting a biotin supplement: We discussed importance of holding supplement for 3 days before lab draw to avoid interference with thyroid function tests.    PLAN  -Labs in the coming week  -For now, continue levothyroxine 125 mcg daily; based on results, we can decide whether to continue at this dose or reduce back to 112 mcg daily (at that time, I can send an updated prescription to [facility] in [place])  -If starting biotin supplement, please stop supplement for 3 days before any lab draws to avoid interference with labs  -Return for a follow-up visit in 3 months (virtual visit is fine)  -We will communicate results via [employer], or if needed by phone      Orders Placed This Encounter   Procedures     TSH     T4 free         Video-Visit Details    Type of service:  Video Visit    Video Start Time: 9:33 AM  Video End Time: 9:58 AM    I spent a total of 58 minutes on the date of encounter  reviewing medical records, evaluating the patient, coordinating care and documenting in the EHR, as detailed above.      Platform used for Video Visit: Debbie Gaviria MD   Division of Diabetes, Endocrinology and Metabolism  Department of Medicine      cc: Brigitte Storey MD; [facility]

## 2022-09-26 NOTE — LETTER
2022       RE: Denise Cazares  [address] [facility] [address]     Dear Colleague,    Thank you for referring your patient, Denise Cazares, to the [facility] [place] at . Please see a copy of my visit note below.      ENDOCRINOLOGY VIDEO VISIT NEW        HISTORY OF PRESENT ILLNESS    Denise Cazares is a 32 year old female who is being evaluated via a billable video visit. The patient is seen in consultation at the request of Dr. Storey for hypothyroidism.    Ms. Cazares recalls hypothyroidism was diagnosed in , during her pregnancy with first child.  Later, while getting care in [place], she recalls being told she has Hashimoto's thyroiditis.  She has been on and off levothyroxine she since then, with interruption in medication due to insurance loss.    She delivered her youngest child in 10/2021. She was on levothyroxine 125 mcg daily during pregnancy, reduced to 100 mcg daily after delivery and dose was increased to 112 mcg daily in 2022. However, patient ran out of prescription for 112 mcg dose and has been filling old prescription of 125 mcg.  She has been on 125 mcg dose for approximately 2 weeks.    Unfortunately, her youngest child was diagnosed with leukemia and is undergoing chemotherapy.  Considering circumstances, the patient feels her energy is fairly good.  She has had longstanding constipation, has bowel movements every 2 days or so and this is stable. Feels warmer than usual. Has had marked hair loss in the past year.    She is breastfeeding: Has noted reduce milk supply once her daughter started nursing less vigorously in recent months.    At times feels a fullness in her anterior neck, more on right side.  No dysphagia, hoarseness, or stridor.     (1 pregnancy ended with blighted ovum).  Has Nexplanon in place and is not having any menstrual bleeding on Nexplanon.  Does not  plan to conceive in the future.    No biotin supplement use.  No excessive head or neck radiation exposure or history of neck surgery.  No family history of thyroid disease.    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  Youngest daughter has been diagnosed with leukemia and is undergoing chemotherapy.  Lives in [place].  Insurance coverage may be an issue once again in the future.    PAST MEDICAL HISTORY  Past Medical History:   Diagnosis Date     Anemia      Chickenpox      Endometrial polyp      Hypothyroidism        MEDICATIONS  Current Outpatient Medications   Medication Sig Dispense Refill     etonogestrel (NEXPLANON) 68 MG IMPL 1 each (68 mg) by Subdermal route once       EUTHYROX 125 MCG tablet Take 125 mcg by mouth daily       levothyroxine (SYNTHROID/LEVOTHROID) 112 MCG tablet Take 1 tablet (112 mcg) by mouth daily 60 tablet 0     Prenatal Vit-Fe Fumarate-FA (PRENATAL MULTIVITAMIN W/IRON) 27-0.8 MG tablet Take 1 tablet by mouth daily 90 tablet 3     Vitamin D3 (CHOLECALCIFEROL) 25 mcg (1000 units) tablet Take 2 tablets (50 mcg) by mouth daily         Allergies, family, and social history were reviewed and documented as needed in EHR.     REVIEW OF SYSTEMS  A focused ROS was performed, with pertinent positives and negatives as noted in the HPI.    PHYSICAL EXAM  There were no vitals taken for this visit.  There is no height or weight on file to calculate BMI.  Constitutional: Patient is alert, oriented and appears in no acute distress.  Eyes: Eyes grossly normal to inspection, EOMI, no stare, lid lag, or retraction; no conjunctival injection.  ENMT: Lips are without lesions.   Neck: No visible goiter or neck mass.  Respiratory: No audible wheeze or cough. No visible cyanosis. No visible increased work of breathing.  Neurological: Alert and oriented times 3.  Cranial nerves grossly intact.      DATA REVIEW  Each of the following  laboratory and/or imaging studies were reviewed.    Component      Latest Ref Rng & Units 5/11/2022 6/24/2022   TSH      0.40 - 4.00 mU/L 12.39 (H) 3.90   T4 Free      0.76 - 1.46 ng/dL 0.80 0.76     Component      Latest Ref Rng & Units 3/9/2022   TSH      0.40 - 4.00 mU/L 0.28 (L)   T4 Free      0.76 - 1.46 ng/dL 1.12       ASSESSMENT  1.  Hypothyroidism.  With prior diagnosis of Hashimoto's thyroiditis.  Clinically does not have overt symptoms of hypo- or hyperthyroidism.  Would check thyroid function tests now, recognizing that she has been on a higher dose of levothyroxine for the past 2 weeks.  Based on results, we will decide whether best to continue on levothyroxine 125 mcg daily or reduce dose back to 112 mcg daily.  She is taking levothyroxine in a manner that maximizes its absorption.  May have a change in insurance status/loss of insurance: We discussed generic levothyroxine is available at a more moderate cost at certain pharmacies and I have asked her to keep me updated if insurance status changes.  We reviewed pathophysiology of Hashimoto's thyroiditis also.    2.  Hair loss.  Likely due to postpartum status and recent variation in thyroid hormone levels.  Address hypothyroidism as above.  We discussed that hair loss should hopefully improve over the coming several months as thyroid status stabilizes.  She has been contemplating starting a biotin supplement: We discussed importance of holding supplement for 3 days before lab draw to avoid interference with thyroid function tests.    PLAN  -Labs in the coming week  -For now, continue levothyroxine 125 mcg daily; based on results, we can decide whether to continue at this dose or reduce back to 112 mcg daily (at that time, I can send an updated prescription to [facility] in [place])  -If starting biotin supplement, please stop supplement for 3 days before any lab draws to avoid interference with labs  -Return for a follow-up visit in 3 months (virtual  visit is fine)  -We will communicate results via MyChart, or if needed by phone      Orders Placed This Encounter   Procedures     TSH     T4 free         Video-Visit Details    Type of service:  Video Visit    Video Start Time: 9:33 AM  Video End Time: 9:58 AM    I spent a total of 58 minutes on the date of encounter reviewing medical records, evaluating the patient, coordinating care and documenting in the EHR, as detailed above.      Platform used for Video Visit: Debbie Gaviria MD   Division of Diabetes, Endocrinology and Metabolism  Department of Medicine      cc: Brigitte Storey MD; [facility]

## 2022-09-26 NOTE — PATIENT INSTRUCTIONS
-Labs in the coming week  -For now, continue levothyroxine 125 mcg daily; based on results, we can decide whether to continue at this dose or reduce back to 112 mcg daily (at that time, I can send an updated prescription to [facility] in [place])  -If starting biotin supplement, please stop supplement for 3 days before any lab draws to avoid interference with labs  -Return for a follow-up visit in 3 months (virtual visit is fine)  -We will communicate results via [place], or if needed by phone

## 2022-09-29 ENCOUNTER — APPOINTMENT (OUTPATIENT)
Dept: LAB | Facility: CLINIC | Age: 32
End: 2022-09-29
Payer: COMMERCIAL

## 2022-09-29 ENCOUNTER — VIRTUAL VISIT (OUTPATIENT)
Dept: ORTHOPEDICS | Facility: CLINIC | Age: 32
End: 2022-09-29
Payer: COMMERCIAL

## 2022-09-29 DIAGNOSIS — M65.969 SYNOVITIS OF KNEE: Primary | ICD-10-CM

## 2022-09-29 PROCEDURE — 99213 OFFICE O/P EST LOW 20 MIN: CPT | Mod: 95 | Performed by: ORTHOPAEDIC SURGERY

## 2022-09-29 NOTE — NURSING NOTE
Chief Complaint   Patient presents with     RECHECK     Review MRI and lab // discuss treatment plan       32 year old  1990             Pain Assessment  Patient Currently in Pain: Denies (no pain but discomfort)  Primary Pain Location: Knee (right)              [facility] - [place], [place] - [address] [facility] [place], [place] - [address] [facility] [place], [place] - [address]        No Known Allergies        Current Outpatient Medications   Medication     etonogestrel (NEXPLANON) 68 MG IMPL     EUTHYROX 125 MCG tablet     levothyroxine (SYNTHROID/LEVOTHROID) 112 MCG tablet     Prenatal Vit-Fe Fumarate-FA (PRENATAL MULTIVITAMIN W/IRON) 27-0.8 MG tablet     Vitamin D3 (CHOLECALCIFEROL) 25 mcg (1000 units) tablet     Current Facility-Administered Medications   Medication     bupivacaine (MARCAINE) 0.25 % injection 4 mL

## 2022-09-29 NOTE — PROGRESS NOTES
[facility] Physicians, Orthopaedic Oncology Surgery Consultation  by Meng Arce M.D.    Denise Cazares MRN# [id number]    YOB: 1990     Requesting physician: Referred Self  [facility]  Forrest Jerome MD Schoeberl, Megan Joan PA-C       I had a virtual follow-up visit with Denise after she was referred for evaluation of her left knee.  I performed an YOSELIN rheumatoid factor and inflammatory markers.  All inflammatory markers are normal however the YOSELIN is borderline positive.  With a speckled pattern at 1:80 titer.  The MRI examination of both knees reveals evidence of effusions and moderate degree of synovitis.    Examination was deferred due to the virtual status of her visit.         Assessment and Plan:   Assessment:  Given the bilateral knee effusions I think a likely having a solitary neoplastic entity within the left knee joint is quite small.  I have not advised any type of synovial biopsy or arthroscopic intervention at the present time.  I believe more appropriate to manage her nonsurgically in a someone who may have a systemic inflammatory arthritis.    I recommend that she return to see the rheumatology specialist for further discussion and management.  If her rheumatologist feels that synovial tissue biopsy is warranted I instructed patient to ask the physician to communicate this to me.    Follow-up with me will be on an as-needed basis.    Meng Arce MD [facility] Family Professor  Oncology and Adult Reconstructive Surgery  Dept Orthopaedic Surgery, [facility]  [phone] office, [phone] pager  [email or web address]    Total combined visit time and work time before and after clinic visit = 30 min  Virtual-Visit Details    Type of service:  Video/telephone Visit  Video/telephone total duration (including visit time, pre and post visit work time as documented above on the same day of service): 20    Visit start time: 1435  Visit end time:2:57  PM  Originating Location (pt. Location): Home  Distant Location (provider location):  [facility]   Platform used for Virtual Visit: Other: ATT              History of Present Illness:   32 year old female  chief complaint    This patient is seen at the request of Forrest Norman, orthopedist, for evaluation of her left knee.  Patient states that a year ago she was having problems with the right knee joint which since resolved.  Then she began experiencing what she thought were sciatic type symptoms in her left lower extremity and she misstepped and afterwards noticed swelling of her contralateral left knee in May 2022.  Since that time she had an aspiration performed twice as well as an MRI examination demonstrating abnormalities resulting in today's referral.  She notes swelling with pain and occasional buckling.    Work-up to date has included Lyme test which was negative, and aspiration White blood count 6030, negative crystals, neutrophil count 38%, culture has Globicatella sanguinis on day 2 broth only culture.  No prior surgeries in the left knee or injury.    Current symptoms:  Problem: pain in knees, feels like cant bend them  Onset and duration: 5/2022  Awakens from sleep due to sx's:  No  Precipitating Injury:  No    Other joints or sites painful:  Yes, Left heel  Fever: No  Appetite change or weight loss: No  History of prior or existing cancer: No           Physical Exam:     EXAMINATION pertinent findings:   PSYCH: Pleasant, healthy-appearing, alert, oriented x3, cooperative. Normal mood and affect.  VITAL SIGNS: currently breastfeeding..  Reviewed nursing intake notes.   There is no height or weight on file to calculate BMI.  RESP: non labored breathing   ABD: benign, soft, non-tender, no acute peritoneal findings  SKIN: grossly normal   LYMPHATIC: grossly normal, no adenopathy, no extremity edema  NEURO: grossly normal , no motor deficits  VASCULAR: satisfactory  perfusion of all extremities   MUSCULOSKELETAL:   Her gait is normal.  No limp.  Hip range of motion full and symmetric bilaterally.      Right knee has large effusion with fluid wave and ballotable patella.  Full extension and further flexion to 135 degrees.  2+ opening to the MCL.  Lateral collateral ligament is tight.  Cruciate ligaments without laxity.    Left knee demonstrates no effusion.  Zero - 235 degree range of motion.  No opening to the MCL or LCL or cruciate ligament laxity.  No focal tenderness.           Data:   All laboratory data reviewed  All imaging studies reviewed by me    MRI examination of the left knee reveals evidence of intra-articular nodular masses, these are present in the intercondylar region as well as behind the PCL and and suprapatellar region.      DATA for DOCUMENTATION:         Past Medical History:     Patient Active Problem List   Diagnosis     Right knee pain     Hypothyroidism     Class 2 obesity in adult     Nexplanon in place 12/14/2021 LT Arm     Past Medical History:   Diagnosis Date     Anemia      Chickenpox      Endometrial polyp      Hypothyroidism        Also see scanned health assessment forms.       Past Surgical History:     Past Surgical History:   Procedure Laterality Date     AS HYSTEROSCOPY W ENDOMETRIAL BX/POLYPECTOMY W/WO D&C       LAPAROSCOPIC CHOLECYSTECTOMY       TONSILLECTOMY              Social History:     Social History     Socioeconomic History     Marital status:      Spouse name: Not on file     Number of children: Not on file     Years of education: Not on file     Highest education level: Not on file   Occupational History     Not on file   Tobacco Use     Smoking status: Never Smoker     Smokeless tobacco: Never Used   Vaping Use     Vaping Use: Never used   Substance and Sexual Activity     Alcohol use: Not Currently     Comment: occasionally-quit with pregnnacy     Drug use: Never     Sexual activity: Yes     Partners: Male     Birth  control/protection: None     Comment: Dominick   Other Topics Concern     Not on file   Social History Narrative     Not on file     Social Determinants of Health     Financial Resource Strain: Not on file   Food Insecurity: Not on file   Transportation Needs: Not on file   Physical Activity: Not on file   Stress: Not on file   Social Connections: Not on file   Intimate Partner Violence: Not on file   Housing Stability: Not on file            Family History:       Family History   Problem Relation Age of Onset     Diabetes Mother      Hypertension Mother      No Known Problems Father      Other - See Comments Maternal Grandfather         MVA            Medications:     Current Outpatient Medications   Medication Sig     etonogestrel (NEXPLANON) 68 MG IMPL 1 each (68 mg) by Subdermal route once     EUTHYROX 125 MCG tablet Take 125 mcg by mouth daily     levothyroxine (SYNTHROID/LEVOTHROID) 112 MCG tablet Take 1 tablet (112 mcg) by mouth daily     Prenatal Vit-Fe Fumarate-FA (PRENATAL MULTIVITAMIN W/IRON) 27-0.8 MG tablet Take 1 tablet by mouth daily     Vitamin D3 (CHOLECALCIFEROL) 25 mcg (1000 units) tablet Take 2 tablets (50 mcg) by mouth daily     Current Facility-Administered Medications   Medication     bupivacaine (MARCAINE) 0.25 % injection 4 mL              Review of Systems:   A comprehensive 10 point review of systems (constitutional, ENT, cardiac, peripheral vascular, lymphatic, respiratory, GI, , Musculoskeletal, skin, Neurological) was performed and found to be negative except as described in this note.     See intake form completed by patient

## 2022-09-29 NOTE — LETTER
9/29/2022         RE: Denise Cazares  [address]  [facility] [address]        Dear Colleague,    Thank you for referring your patient, Denise Cazares, to the [facility]. Please see a copy of my visit note below.        [facility] Physicians, Orthopaedic Oncology Surgery Consultation  by Meng Arce M.D.    Denise Cazares MRN# [id number]    YOB: 1990     Requesting physician: Referred Self  Clinic - [facility]  Forrest Jerome MD Schoeberl, Megan Joan PA-C       I had a virtual follow-up visit with Denise after she was referred for evaluation of her left knee.  I performed an YOSELIN rheumatoid factor and inflammatory markers.  All inflammatory markers are normal however the YOSELIN is borderline positive.  With a speckled pattern at 1:80 titer.  The MRI examination of both knees reveals evidence of effusions and moderate degree of synovitis.    Examination was deferred due to the virtual status of her visit.         Assessment and Plan:   Assessment:  Given the bilateral knee effusions I think a likely having a solitary neoplastic entity within the left knee joint is quite small.  I have not advised any type of synovial biopsy or arthroscopic intervention at the present time.  I believe more appropriate to manage her nonsurgically in a someone who may have a systemic inflammatory arthritis.    I recommend that she return to see the rheumatology specialist for further discussion and management.  If her rheumatologist feels that synovial tissue biopsy is warranted I instructed patient to ask the physician to communicate this to me.    Follow-up with me will be on an as-needed basis.    MD Tommy Zheng Family Professor  Oncology and Adult Reconstructive Surgery  Dept Orthopaedic Surgery, [facility]  [phone] office, [phone] pager  [email or web address]    Total combined visit time and work time before and  after clinic visit = 30 min  Virtual-Visit Details    Type of service:  Video/telephone Visit  Video/telephone total duration (including visit time, pre and post visit work time as documented above on the same day of service): 20    Visit start time: 1435  Visit end time:2:57 PM  Originating Location (pt. Location): Home  Distant Location (provider location):  [facility]   Platform used for Virtual Visit: Other: ATT              History of Present Illness:   32 year old female  chief complaint    This patient is seen at the request of Forrest Norman, orthopedist, for evaluation of her left knee.  Patient states that a year ago she was having problems with the right knee joint which since resolved.  Then she began experiencing what she thought were sciatic type symptoms in her left lower extremity and she misstepped and afterwards noticed swelling of her contralateral left knee in May 2022.  Since that time she had an aspiration performed twice as well as an MRI examination demonstrating abnormalities resulting in today's referral.  She notes swelling with pain and occasional buckling.    Work-up to date has included Lyme test which was negative, and aspiration White blood count 6030, negative crystals, neutrophil count 38%, culture has Globicatella sanguinis on day 2 broth only culture.  No prior surgeries in the left knee or injury.    Current symptoms:  Problem: pain in knees, feels like cant bend them  Onset and duration: 5/2022  Awakens from sleep due to sx's:  No  Precipitating Injury:  No    Other joints or sites painful:  Yes, Left heel  Fever: No  Appetite change or weight loss: No  History of prior or existing cancer: No           Physical Exam:     EXAMINATION pertinent findings:   PSYCH: Pleasant, healthy-appearing, alert, oriented x3, cooperative. Normal mood and affect.  VITAL SIGNS: currently breastfeeding..  Reviewed nursing intake notes.   There is no height or  weight on file to calculate BMI.  RESP: non labored breathing   ABD: benign, soft, non-tender, no acute peritoneal findings  SKIN: grossly normal   LYMPHATIC: grossly normal, no adenopathy, no extremity edema  NEURO: grossly normal , no motor deficits  VASCULAR: satisfactory perfusion of all extremities   MUSCULOSKELETAL:   Her gait is normal.  No limp.  Hip range of motion full and symmetric bilaterally.      Right knee has large effusion with fluid wave and ballotable patella.  Full extension and further flexion to 135 degrees.  2+ opening to the MCL.  Lateral collateral ligament is tight.  Cruciate ligaments without laxity.    Left knee demonstrates no effusion.  Zero - 235 degree range of motion.  No opening to the MCL or LCL or cruciate ligament laxity.  No focal tenderness.           Data:   All laboratory data reviewed  All imaging studies reviewed by me    MRI examination of the left knee reveals evidence of intra-articular nodular masses, these are present in the intercondylar region as well as behind the PCL and and suprapatellar region.      DATA for DOCUMENTATION:         Past Medical History:     Patient Active Problem List   Diagnosis     Right knee pain     Hypothyroidism     Class 2 obesity in adult     Nexplanon in place 12/14/2021 LT Arm     Past Medical History:   Diagnosis Date     Anemia      Chickenpox      Endometrial polyp      Hypothyroidism        Also see scanned health assessment forms.       Past Surgical History:     Past Surgical History:   Procedure Laterality Date     AS HYSTEROSCOPY W ENDOMETRIAL BX/POLYPECTOMY W/WO D&C       LAPAROSCOPIC CHOLECYSTECTOMY       TONSILLECTOMY              Social History:     Social History     Socioeconomic History     Marital status:      Spouse name: Not on file     Number of children: Not on file     Years of education: Not on file     Highest education level: Not on file   Occupational History     Not on file   Tobacco Use     Smoking  status: Never Smoker     Smokeless tobacco: Never Used   Vaping Use     Vaping Use: Never used   Substance and Sexual Activity     Alcohol use: Not Currently     Comment: occasionally-quit with pregnnacy     Drug use: Never     Sexual activity: Yes     Partners: Male     Birth control/protection: None     Comment: Dominick   Other Topics Concern     Not on file   Social History Narrative     Not on file     Social Determinants of Health     Financial Resource Strain: Not on file   Food Insecurity: Not on file   Transportation Needs: Not on file   Physical Activity: Not on file   Stress: Not on file   Social Connections: Not on file   Intimate Partner Violence: Not on file   Housing Stability: Not on file            Family History:       Family History   Problem Relation Age of Onset     Diabetes Mother      Hypertension Mother      No Known Problems Father      Other - See Comments Maternal Grandfather         MVA            Medications:     Current Outpatient Medications   Medication Sig     etonogestrel (NEXPLANON) 68 MG IMPL 1 each (68 mg) by Subdermal route once     EUTHYROX 125 MCG tablet Take 125 mcg by mouth daily     levothyroxine (SYNTHROID/LEVOTHROID) 112 MCG tablet Take 1 tablet (112 mcg) by mouth daily     Prenatal Vit-Fe Fumarate-FA (PRENATAL MULTIVITAMIN W/IRON) 27-0.8 MG tablet Take 1 tablet by mouth daily     Vitamin D3 (CHOLECALCIFEROL) 25 mcg (1000 units) tablet Take 2 tablets (50 mcg) by mouth daily     Current Facility-Administered Medications   Medication     bupivacaine (MARCAINE) 0.25 % injection 4 mL              Review of Systems:   A comprehensive 10 point review of systems (constitutional, ENT, cardiac, peripheral vascular, lymphatic, respiratory, GI, , Musculoskeletal, skin, Neurological) was performed and found to be negative except as described in this note.     See intake form completed by patient

## 2022-10-03 DIAGNOSIS — E03.9 HYPOTHYROIDISM, UNSPECIFIED TYPE: Primary | ICD-10-CM

## 2022-10-03 RX ORDER — LEVOTHYROXINE SODIUM 125 UG/1
125 TABLET ORAL DAILY
Qty: 30 TABLET | Refills: 2 | Status: SHIPPED | OUTPATIENT
Start: 2022-10-03 | End: 2022-11-08 | Stop reason: DRUGHIGH

## 2022-11-07 ENCOUNTER — LAB (OUTPATIENT)
Dept: LAB | Facility: CLINIC | Age: 32
End: 2022-11-07
Payer: COMMERCIAL

## 2022-11-07 DIAGNOSIS — E03.9 HYPOTHYROIDISM, UNSPECIFIED TYPE: ICD-10-CM

## 2022-11-07 LAB
T4 FREE SERPL-MCNC: 1.4 NG/DL (ref 0.76–1.46)
TSH SERPL DL<=0.005 MIU/L-ACNC: 0.02 MU/L (ref 0.4–4)

## 2022-11-07 PROCEDURE — 84443 ASSAY THYROID STIM HORMONE: CPT

## 2022-11-07 PROCEDURE — 36415 COLL VENOUS BLD VENIPUNCTURE: CPT

## 2022-11-07 PROCEDURE — 84439 ASSAY OF FREE THYROXINE: CPT

## 2022-11-08 DIAGNOSIS — E03.9 HYPOTHYROIDISM, UNSPECIFIED TYPE: Primary | ICD-10-CM

## 2022-11-08 RX ORDER — LEVOTHYROXINE SODIUM 112 UG/1
112 TABLET ORAL DAILY
Qty: 90 TABLET | Refills: 1 | Status: SHIPPED | OUTPATIENT
Start: 2022-11-08 | End: 2023-01-23

## 2022-11-18 ENCOUNTER — VIRTUAL VISIT (OUTPATIENT)
Dept: RHEUMATOLOGY | Facility: CLINIC | Age: 32
End: 2022-11-18
Payer: COMMERCIAL

## 2022-11-18 DIAGNOSIS — R76.8 POSITIVE ANA (ANTINUCLEAR ANTIBODY): Primary | ICD-10-CM

## 2022-11-18 DIAGNOSIS — M19.90 INFLAMMATORY ARTHRITIS: ICD-10-CM

## 2022-11-18 DIAGNOSIS — Z79.899 HIGH RISK MEDICATION USE: ICD-10-CM

## 2022-11-18 PROCEDURE — 99214 OFFICE O/P EST MOD 30 MIN: CPT | Mod: 95 | Performed by: PHYSICIAN ASSISTANT

## 2022-11-18 RX ORDER — HYDROXYCHLOROQUINE SULFATE 200 MG/1
200 TABLET, FILM COATED ORAL 2 TIMES DAILY
Qty: 180 TABLET | Refills: 0 | Status: SHIPPED | OUTPATIENT
Start: 2022-11-18 | End: 2023-03-27

## 2022-11-18 NOTE — PROGRESS NOTES
Video-Visit Details    Type of service:  Video Visit  Is Pt currently in [place]? Yes    NOTE:  If Pt is not in [place], Appointment needs to be canceled and rescheduled.      Video Start Time (time video started): 9:49 AM    Video End Time (time video stopped): 10:07 AM    Originating Location (pt. Location): Other other         Distant Location (provider location):  Off-site    Mode of Communication:  Video Conference via [email or web address]    Physician has received verbal consent for a Video Visit from the patient? Yes      Cintia Desai MA          Rheumatology Clinic Visit  [facility]  RAOUL Kent MRN# [id number]   YOB: 1990 Age: 32 year old   Date of Visit: 11/18/2022  [facility]          Assessment and Plan:     1.  Inflammatory arthritis  2.  Positive YOSELIN  3.  High-risk medication use    Patient presents today for follow-up regarding her polyarthritis.  She did see orthopedics.  An MRI was performed which did show some mild synovitis in the large joint effusion.  She has previously been evaluated with infectious disease as well and they did not believe that she had active Lyme disease.  Orthopedics did also not feel that surgery would be warranted for her knee pain.  Previous laboratory evaluations and imaging studies reviewed.  Results below.    Certainly with the mild synovitis on MRI, this is suggestive of an inflammatory arthritis.  Infectious disease did not believe this to be an active Lyme disease.  She did have a borderline positive YOSELIN. Discussed with the patient that a positive YOSELIN is of unknown significance.  10% of the healthy population can have a positive YOSELIN.  We do get false positives with this test as well.  Discussed that a positive YOSELIN can be associated with nonrheumatological autoimmune disorder such as thyroiditis.  It can also be associated with rheumatological autoimmune disorder such as connective  tissue disorders or scleroderma.  A positive YOSELIN has also been known to be associated with some viral and bacterial infections.  We will however further evaluate this with sub serologies.    With her symptoms as well as MRI findings we will treat for an inflammatory arthritis.  She has no history of psoriasis.  We will start with hydroxychloroquine.  Side effects of this medication were reviewed with the patient.  Discussed with the patient that should we start treatment for inflammatory arthritis and her symptoms not improve/resolve further work-up including potential synovium biopsy of the knee may be considered.      Plan:       1. Schedule follow-up with Bre Chanel PA-C in 3 months.   2. Labs: WILMER panel, dsDNA, Urine, Scl-70, Centromere antibody, complement levels  3. Medication recommendations:   a. Hydroxychloroquine: will start you on 200mg 2 times daily  b. -While on hydroxychloroquine it is important that you get an eye exam annually. Make sure to let your eye provider know that you are taking this medication.   c. -Labs (AST/ALT, creatinine, CBC with platelets) should be monitored every 3 months  d. # Hydroxychloroquine (Plaquenil) Risks and Benefits:  The risks and benefits of hydroxychloroquine were discussed in detail and the patient verbalized understanding; the patient also verbalized agreement to get the required ophthalmologic toxicity monitoring.  The risks discussed include, but are not limited to, the risk for hypersensitivity, anaphylaxis, anaphylactoid reactions, irreversible retinal damage, rare hematologic reactions, and rare cardiomyopathy.  Patients with G6PD deficiency or hepatic impairment may be at an increased risk for adverse effects.  I encouraged reviewing the package insert and asking any questions about the medication.      RAOUL Kent  Rheumatology         History of Present Illness:   Denise Cazares presents for evaluation of polyarthralgia.      Interval history  "November 18, 2022:    She states that she spoke with her orthopedic provider and it did not think that anything needed to be done surgically.     She states that she has a hard time going up and down the stairs. She feels that her knee \"alexandru\". She woke up this morning with discomfort in her left knee. No skin rashes. No dry eyes or dry mouth. She has had some post-partum hair loss.     She states that her wrists have been okay. When she lays down and then goes to get up, she has pain in her heel, so she does limp until it resolves. She reports her  saying that she walks \"weird\". No Raynaud's. No shortness of breath, chest pain or pleurisy. No recent infections. No fevers.    HPI from Consult 07/25/2022:  She states that in April, she started to have pain in her left sciatic nerve. She then started to have pain on the top of her left foot by the ankle. She has a daughter with leukemia and was at the hospital. She then started to have her left knee swell. She had previously had swelling in ehr right knee with fluid removed. She got the liquid removed on the left. 1 week later, she needed it removed again. She then started to have pain in her wrists. She had a positive lyme and was given amoxicillin. She saw infectious disease but was told they were uncertain if she had lyme. 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 She had further blood showing antibodies for lyme. She feels that both of her knees are swollen and the bottom of her heel. She has pain when she first stands up on the heel and that resolves after walking a few steps. She states that it is hard to bend her knees in the morning. She also has hypermobile joints as well and there is question on if this is causing \"wear and tear\". She has seen orthopedics and PT was recommended. She is able to walk better since completing the antibiotic, but she continues to feel that there is a lot of swelling in her joints. She feels that she is worse in the " mornings after periods of rest. No skin rashes. She has some fatigue, but she feels this is related to all the stress with her daughter.     Her mother complains about pain in her hands but is unsure if there is RA. No family history of lupus. No personal or family history of psoriasis, ulcerative colitis or crohn's.          Review of Systems:     Constitutional: negative  Skin: negative  Eyes: negative  Ears/Nose/Throat: negative  Respiratory: No shortness of breath, dyspnea on exertion, cough, or hemoptysis  Cardiovascular: negative  Gastrointestinal: negative  Genitourinary: negative  Musculoskeletal: as above  Neurologic: negative  Psychiatric: negative  Hematologic/Lymphatic/Immunologic: negative  Endocrine: negative         Active Problem List:     Patient Active Problem List    Diagnosis Date Noted     Nexplanon in place 12/14/2021 LT Arm 12/14/2021     Priority: Medium     Nexplanon placed 12/14/2021  LOT# [id number]  Exp: 04/09/2024  NDC# 45276-338-78    Dinora Morrison on 12/14/2021 at 9:53 AM           Class 2 obesity in adult 10/12/2021     Priority: Medium     Right knee pain 02/26/2021     Priority: Medium     Hypothyroidism      Priority: Medium            Past Medical History:     Past Medical History:   Diagnosis Date     Anemia      Chickenpox      Endometrial polyp      Hypothyroidism      Past Surgical History:   Procedure Laterality Date     AS HYSTEROSCOPY W ENDOMETRIAL BX/POLYPECTOMY W/WO D&C       LAPAROSCOPIC CHOLECYSTECTOMY       TONSILLECTOMY              Social History:     Social History     Socioeconomic History     Marital status:      Spouse name: Not on file     Number of children: Not on file     Years of education: Not on file     Highest education level: Not on file   Occupational History     Not on file   Tobacco Use     Smoking status: Never     Smokeless tobacco: Never   Vaping Use     Vaping Use: Never used   Substance and Sexual Activity     Alcohol use: Not  Currently     Comment: occasionally-quit with pregnnacy     Drug use: Never     Sexual activity: Yes     Partners: Male     Birth control/protection: None     Comment: Dominick   Other Topics Concern     Not on file   Social History Narrative     Not on file     Social Determinants of Health     Financial Resource Strain: Not on file   Food Insecurity: Not on file   Transportation Needs: Not on file   Physical Activity: Not on file   Stress: Not on file   Social Connections: Not on file   Intimate Partner Violence: Not on file   Housing Stability: Not on file          Family History:     Family History   Problem Relation Age of Onset     Diabetes Mother      Hypertension Mother      No Known Problems Father      Other - See Comments Maternal Grandfather         MVA            Allergies:   No Known Allergies         Medications:     Current Outpatient Medications   Medication Sig Dispense Refill     etonogestrel (NEXPLANON) 68 MG IMPL 1 each (68 mg) by Subdermal route once       levothyroxine (SYNTHROID/LEVOTHROID) 112 MCG tablet Take 1 tablet (112 mcg) by mouth daily 90 tablet 1     Prenatal Vit-Fe Fumarate-FA (PRENATAL MULTIVITAMIN W/IRON) 27-0.8 MG tablet Take 1 tablet by mouth daily 90 tablet 3     Vitamin D3 (CHOLECALCIFEROL) 25 mcg (1000 units) tablet Take 2 tablets (50 mcg) by mouth daily              Physical Exam:   currently breastfeeding.  Wt Readings from Last 6 Encounters:   08/11/22 80.7 kg (178 lb)   07/25/22 77.6 kg (171 lb)   07/15/22 77.7 kg (171 lb 6.4 oz)   05/23/22 75.8 kg (167 lb)   05/20/22 75.8 kg (167 lb)   05/16/22 76.2 kg (167 lb 14.4 oz)     Constitutional: well-developed, appearing stated age; cooperative  Eyes: nl  conjunctiva, sclera  ENT: nl external ears, nose, hearing, lips,   Resp: No shortness of breath with normal conversation  Psych: nl judgement, orientation, memory, affect.           Data:   Imaging:  MRI left knee 5/31/2022  Impression:   1. Nodular intra-articular foci as  detailed above. Differential would   include infectious or inflammatory etiology. Nodular synovitis and/or   PVNS are also included in the differential. Consider referral to   orthopedic oncology at the [facility].     2. Repeat MRI with gadolinium and susceptibility weighted imaging   could be considered in 3-6 months.     Xray left foot 5/3/2022  IMPRESSION: No fractures are evident. Normal joint spacing and  alignment. The soft tissues are unremarkable.     MRI right knee 8/14/22  Impression: Large knee joint effusion with area of mild synovitis.  Given contralateral knee with possible synovitis finding, this may be  systemic condition. Especially with positive lyme antibodies,  differential consideration include lyme arthritis. No evidence of  erosion, reactive osteitis.     Laboratory:  5/11/2022  Uric acid 2.1  Rheumatoid factor 7  YOSELIN negative    5/18/2022  CCP antibody 2.6  CBC within normal parameters    6/24/2022  TSH 3.9, T4 0.76    7/15/2022  Creatinine 0.56, GFR greater than 80  ALT 32, AST 20  CRP 6.3  Vitamin D 18  Sed rate 22  LYme confirm IgG reactive, 9.0      Fluid analysis: 5/16/2022: 6K WBC, 38% PMNs, 36% lymphocytes. Cultures on broth showed likely contaminate of diptheroids, globicatella sanuinis. Lyme analysis showed reactive IgG    8/11/2022   CRP less than 2.9  Rheumatoid factor 8  Sed rate 15  YOSELIN borderline positive with a speckled pattern and a titer of 1: 80

## 2022-11-18 NOTE — PATIENT INSTRUCTIONS
After Visit Instructions:     Thank you for coming to [facility] for your care. It is my goal to partner with you to help you reach your optimal state of health.       Plan:     Schedule follow-up with Bre Chanel PA-C in 3 months.   Labs: WILMER panel, dsDNA, Urine, Scl-70, Centromere antibody, complement levels  Medication recommendations:   Hydroxychloroquine: will start you on 200mg 2 times daily  -While on hydroxychloroquine it is important that you get an eye exam annually. Make sure to let your eye provider know that you are taking this medication.   -Labs (AST/ALT, creatinine, CBC with platelets) should be monitored every 3 months  # Hydroxychloroquine (Plaquenil) Risks and Benefits:  The risks and benefits of hydroxychloroquine were discussed in detail and the patient verbalized understanding; the patient also verbalized agreement to get the required ophthalmologic toxicity monitoring.  The risks discussed include, but are not limited to, the risk for hypersensitivity, anaphylaxis, anaphylactoid reactions, irreversible retinal damage, rare hematologic reactions, and rare cardiomyopathy.  Patients with G6PD deficiency or hepatic impairment may be at an increased risk for adverse effects.  I encouraged reviewing the package insert and asking any questions about the medication.          Bre Chanel PA-C  [facility]  [facility]    Contact information: [facility]  Clinic Number:  [phone]  Please call or send a [facility] message with any questions about your care

## 2022-12-01 ENCOUNTER — LAB (OUTPATIENT)
Dept: LAB | Facility: CLINIC | Age: 32
End: 2022-12-01
Payer: COMMERCIAL

## 2022-12-01 DIAGNOSIS — Z79.899 HIGH RISK MEDICATION USE: ICD-10-CM

## 2022-12-01 DIAGNOSIS — M19.90 INFLAMMATORY ARTHRITIS: ICD-10-CM

## 2022-12-01 DIAGNOSIS — R76.8 POSITIVE ANA (ANTINUCLEAR ANTIBODY): ICD-10-CM

## 2022-12-01 LAB
ALBUMIN SERPL-MCNC: 3.9 G/DL (ref 3.4–5)
ALT SERPL W P-5'-P-CCNC: 92 U/L (ref 0–50)
AST SERPL W P-5'-P-CCNC: 30 U/L (ref 0–45)
CREAT SERPL-MCNC: 0.49 MG/DL (ref 0.52–1.04)
CRP SERPL-MCNC: 3.7 MG/L (ref 0–8)
ERYTHROCYTE [SEDIMENTATION RATE] IN BLOOD BY WESTERGREN METHOD: 10 MM/HR (ref 0–20)
GFR SERPL CREATININE-BSD FRML MDRD: >90 ML/MIN/1.73M2

## 2022-12-01 PROCEDURE — 82565 ASSAY OF CREATININE: CPT

## 2022-12-01 PROCEDURE — 86160 COMPLEMENT ANTIGEN: CPT

## 2022-12-01 PROCEDURE — 86235 NUCLEAR ANTIGEN ANTIBODY: CPT

## 2022-12-01 PROCEDURE — 85027 COMPLETE CBC AUTOMATED: CPT

## 2022-12-01 PROCEDURE — 86225 DNA ANTIBODY NATIVE: CPT

## 2022-12-01 PROCEDURE — 86140 C-REACTIVE PROTEIN: CPT

## 2022-12-01 PROCEDURE — 84450 TRANSFERASE (AST) (SGOT): CPT

## 2022-12-01 PROCEDURE — 82040 ASSAY OF SERUM ALBUMIN: CPT

## 2022-12-01 PROCEDURE — 36415 COLL VENOUS BLD VENIPUNCTURE: CPT

## 2022-12-01 PROCEDURE — 84460 ALANINE AMINO (ALT) (SGPT): CPT

## 2022-12-01 PROCEDURE — 85652 RBC SED RATE AUTOMATED: CPT

## 2022-12-02 LAB
C3 SERPL-MCNC: 141 MG/DL (ref 81–157)
C4 SERPL-MCNC: 30 MG/DL (ref 13–39)
DSDNA AB SER-ACNC: 1.9 IU/ML
ERYTHROCYTE [DISTWIDTH] IN BLOOD BY AUTOMATED COUNT: 13.2 % (ref 10–15)
HCT VFR BLD AUTO: 45.6 % (ref 35–47)
HGB BLD-MCNC: 14.9 G/DL (ref 11.7–15.7)
MCH RBC QN AUTO: 28.7 PG (ref 26.5–33)
MCHC RBC AUTO-ENTMCNC: 32.7 G/DL (ref 31.5–36.5)
MCV RBC AUTO: 88 FL (ref 78–100)
PLATELET # BLD AUTO: 215 10E3/UL (ref 150–450)
RBC # BLD AUTO: 5.2 10E6/UL (ref 3.8–5.2)
WBC # BLD AUTO: 6.8 10E3/UL (ref 4–11)

## 2022-12-06 LAB
CENTROMERE B AB SER-ACNC: <0.7 U/ML
CENTROMERE B AB SER-ACNC: NEGATIVE
ENA SCL70 IGG SER IA-ACNC: <0.6 U/ML
ENA SCL70 IGG SER IA-ACNC: NEGATIVE
ENA SM IGG SER IA-ACNC: <0.7 U/ML
ENA SM IGG SER IA-ACNC: NEGATIVE
ENA SS-A AB SER IA-ACNC: <0.5 U/ML
ENA SS-A AB SER IA-ACNC: NEGATIVE
ENA SS-B IGG SER IA-ACNC: <0.6 U/ML
ENA SS-B IGG SER IA-ACNC: NEGATIVE
U1 SNRNP IGG SER IA-ACNC: 6.7 U/ML
U1 SNRNP IGG SER IA-ACNC: ABNORMAL

## 2023-01-19 ENCOUNTER — MYC MEDICAL ADVICE (OUTPATIENT)
Dept: RHEUMATOLOGY | Facility: CLINIC | Age: 33
End: 2023-01-19

## 2023-01-19 ENCOUNTER — LAB (OUTPATIENT)
Dept: LAB | Facility: CLINIC | Age: 33
End: 2023-01-19
Payer: COMMERCIAL

## 2023-01-19 DIAGNOSIS — M19.90 INFLAMMATORY ARTHRITIS: ICD-10-CM

## 2023-01-19 DIAGNOSIS — R76.8 POSITIVE ANA (ANTINUCLEAR ANTIBODY): ICD-10-CM

## 2023-01-19 DIAGNOSIS — Z79.899 HIGH RISK MEDICATION USE: ICD-10-CM

## 2023-01-19 DIAGNOSIS — E03.9 HYPOTHYROIDISM, UNSPECIFIED TYPE: ICD-10-CM

## 2023-01-19 LAB
ALBUMIN SERPL BCG-MCNC: 4.4 G/DL (ref 3.5–5.2)
ALT SERPL W P-5'-P-CCNC: 40 U/L (ref 10–35)
AST SERPL W P-5'-P-CCNC: 24 U/L (ref 10–35)
CREAT SERPL-MCNC: 0.53 MG/DL (ref 0.51–0.95)
CRP SERPL-MCNC: <3 MG/L
ERYTHROCYTE [DISTWIDTH] IN BLOOD BY AUTOMATED COUNT: 13.5 % (ref 10–15)
ERYTHROCYTE [SEDIMENTATION RATE] IN BLOOD BY WESTERGREN METHOD: 11 MM/HR (ref 0–20)
GFR SERPL CREATININE-BSD FRML MDRD: >90 ML/MIN/1.73M2
HCT VFR BLD AUTO: 42.4 % (ref 35–47)
HGB BLD-MCNC: 14.1 G/DL (ref 11.7–15.7)
MCH RBC QN AUTO: 28.3 PG (ref 26.5–33)
MCHC RBC AUTO-ENTMCNC: 33.3 G/DL (ref 31.5–36.5)
MCV RBC AUTO: 85 FL (ref 78–100)
PLATELET # BLD AUTO: 192 10E3/UL (ref 150–450)
RBC # BLD AUTO: 4.99 10E6/UL (ref 3.8–5.2)
TSH SERPL DL<=0.005 MIU/L-ACNC: 0.55 UIU/ML (ref 0.3–4.2)
WBC # BLD AUTO: 6 10E3/UL (ref 4–11)

## 2023-01-19 PROCEDURE — 36415 COLL VENOUS BLD VENIPUNCTURE: CPT

## 2023-01-19 PROCEDURE — 85027 COMPLETE CBC AUTOMATED: CPT

## 2023-01-19 PROCEDURE — 85652 RBC SED RATE AUTOMATED: CPT

## 2023-01-19 PROCEDURE — 82040 ASSAY OF SERUM ALBUMIN: CPT

## 2023-01-19 PROCEDURE — 84460 ALANINE AMINO (ALT) (SGPT): CPT

## 2023-01-19 PROCEDURE — 84450 TRANSFERASE (AST) (SGOT): CPT

## 2023-01-19 PROCEDURE — 82565 ASSAY OF CREATININE: CPT

## 2023-01-19 PROCEDURE — 86140 C-REACTIVE PROTEIN: CPT

## 2023-01-19 PROCEDURE — 84443 ASSAY THYROID STIM HORMONE: CPT

## 2023-01-23 ENCOUNTER — VIRTUAL VISIT (OUTPATIENT)
Dept: ENDOCRINOLOGY | Facility: CLINIC | Age: 33
End: 2023-01-23
Payer: COMMERCIAL

## 2023-01-23 DIAGNOSIS — E03.9 HYPOTHYROIDISM, UNSPECIFIED TYPE: ICD-10-CM

## 2023-01-23 PROCEDURE — 99214 OFFICE O/P EST MOD 30 MIN: CPT | Mod: 95 | Performed by: INTERNAL MEDICINE

## 2023-01-23 RX ORDER — LEVOTHYROXINE SODIUM 112 UG/1
112 TABLET ORAL DAILY
Qty: 90 TABLET | Refills: 1 | Status: SHIPPED | OUTPATIENT
Start: 2023-01-23 | End: 2023-06-12

## 2023-01-23 NOTE — LETTER
2023       RE: Denise Cazares  [address] [facility] [id number]     Dear Colleague,    Thank you for referring your patient, Denise Cazares, to the [facility] [place] at [facility]. Please see a copy of my visit note below.      ENDOCRINOLOGY VIDEO FOLLOW-UP      HISTORY OF PRESENT ILLNESS    Denise Cazares is seen in follow-up via a billable video visit.     At initial visit in 2022, we continued levothyroxine 125 mcg daily (patient had been using an old prescription after running out of her current 112 mcg dose).  However, when follow-up thyroid function tests returned consistent with hyperthyroidism we reduced levothyroxine to 112 mcg daily in 2022.    Confirms that she is taking levothyroxine 112 mcg daily.  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    Still breast-feeding, although primarily for the comfort of her daughter: No issues with breast milk supply with thyroid hormone dosage changes.    Hair loss, which was a significant concern at initial visit, has improved significantly.    Diagnosed with inflammatory arthritis and rheumatology: Prescribed hydroxychloroquine with improvement in knee pain.    Pertinent endocrine and related history:  1.  Hypothyroidism.  Diagnosed in  during pregnancy, attributed to Hashimoto's thyroiditis.  Has been on and off levothyroxine she since then, with interruption in medication due to insurance loss.  - (1 pregnancy ended with blighted ovum).  Has Nexplanon in place and is not having any menstrual bleeding on Nexplanon.  Does not plan to conceive in the future.    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  Youngest daughter has been diagnosed with leukemia and is undergoing chemotherapy.  Lives in [place].      PAST MEDICAL HISTORY  Past Medical History:   Diagnosis Date     Anemia      Chickenpox      Endometrial polyp      Hypothyroidism        MEDICATIONS  Current Outpatient Medications   Medication Sig Dispense Refill     etonogestrel (NEXPLANON) 68 MG IMPL 1 each (68 mg) by Subdermal route once       hydroxychloroquine (PLAQUENIL) 200 MG tablet Take 1 tablet (200 mg) by mouth 2 times daily 180 tablet 0     levothyroxine (SYNTHROID/LEVOTHROID) 112 MCG tablet Take 1 tablet (112 mcg) by mouth daily 90 tablet 1     Prenatal Vit-Fe Fumarate-FA (PRENATAL MULTIVITAMIN W/IRON) 27-0.8 MG tablet Take 1 tablet by mouth daily 90 tablet 3     Vitamin D3 (CHOLECALCIFEROL) 25 mcg (1000 units) tablet Take 2 tablets (50 mcg) by mouth daily         Allergies, family, and social history were reviewed and documented as needed in EHR.     REVIEW OF SYSTEMS  A focused ROS was performed, with pertinent positives and negatives as noted in the HPI.    PHYSICAL EXAM  There were no vitals taken for this visit.  There is no height or weight on file to calculate BMI.  Constitutional: Patient is alert, oriented and appears in no acute distress.  Eyes: Eyes grossly normal to inspection, EOMI, no stare, lid lag, or retraction; no conjunctival injection.  ENMT: Lips are without lesions.   Neck: No visible goiter or neck mass.  Respiratory: No audible wheeze or cough. No visible cyanosis. No visible increased work of breathing.  Neurological: Alert and oriented times 3.  Cranial nerves grossly intact.      DATA REVIEW  Each of the following laboratory and/or imaging studies were reviewed.    Component      Latest Ref Rng & Units 11/7/2022 1/19/2023   TSH      0.40 - 4.00 mU/L 0.02 (L)    T4 Free      0.76 - 1.46 ng/dL 1.40    TSH      0.30 - 4.20 uIU/mL  0.55       ASSESSMENT  1.  Hypothyroidism.  With prior diagnosis of  Hashimoto's thyroiditis.  Biochemically and clinically euthyroid, based on previsit labs and our discussion today.  Would continue current dose of levothyroxine without changes.  Check thyroid function tests in 3 months, follow-up in 6 months.  We discussed updating me if noting new symptoms concerning for thyroid disease or if she contemplates pregnancy for preconception planning: At present, Ms. Cazares has no plans for conception in the future, on Nexplanon for contraception.  We also rediscussed pathophysiology of Hashimoto's thyroiditis.    PLAN  -Continue levothyroxine 112 mcg daily  -Labs in 3 months  -Return for a follow-up visit in 6 months (virtual visit is fine)  -We will communicate results via [employer], or if needed by phone      Orders Placed This Encounter   Procedures     TSH with free T4 reflex         Video-Visit Details    Type of service:  Video Visit    Video Start Time: 12:29 PM  Video End Time: 12:44 PM    Physician location: Off site    I spent a total of 30 minutes on the date of encounter reviewing medical records, evaluating the patient, coordinating care and documenting in the EHR, as detailed above.    Platform used for Video Visit: Javid Gaviria MD   Division of Diabetes, Endocrinology and Metabolism  Department of Medicine

## 2023-01-23 NOTE — PATIENT INSTRUCTIONS
-Continue levothyroxine 112 mcg daily  -Labs in 3 months  -Return for a follow-up visit in 6 months (virtual visit is fine)  -We will communicate results via [employer], or if needed by phone

## 2023-01-23 NOTE — PROGRESS NOTES
ENDOCRINOLOGY VIDEO FOLLOW-UP      HISTORY OF PRESENT ILLNESS    Denise Cazares is seen in follow-up via a billable video visit.     At initial visit in 2022, we continued levothyroxine 125 mcg daily (patient had been using an old prescription after running out of her current 112 mcg dose).  However, when follow-up thyroid function tests returned consistent with hyperthyroidism we reduced levothyroxine to 112 mcg daily in 2022.    Confirms that she is taking levothyroxine 112 mcg daily.  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    Still breast-feeding, although primarily for the comfort of her daughter: No issues with breast milk supply with thyroid hormone dosage changes.    Hair loss, which was a significant concern at initial visit, has improved significantly.    Diagnosed with inflammatory arthritis and rheumatology: Prescribed hydroxychloroquine with improvement in knee pain.    Pertinent endocrine and related history:  1.  Hypothyroidism.  Diagnosed in  during pregnancy, attributed to Hashimoto's thyroiditis.  Has been on and off levothyroxine she since then, with interruption in medication due to insurance loss.  - (1 pregnancy ended with blighted ovum).  Has Nexplanon in place and is not having any menstrual bleeding on Nexplanon.  Does not plan to conceive in the future.    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  Youngest daughter has been diagnosed with leukemia and is undergoing chemotherapy.  Lives in [place].      PAST MEDICAL HISTORY  Past Medical History:   Diagnosis Date     Anemia      Chickenpox      Endometrial polyp      Hypothyroidism        MEDICATIONS  Current Outpatient Medications   Medication Sig  Dispense Refill     etonogestrel (NEXPLANON) 68 MG IMPL 1 each (68 mg) by Subdermal route once       hydroxychloroquine (PLAQUENIL) 200 MG tablet Take 1 tablet (200 mg) by mouth 2 times daily 180 tablet 0     levothyroxine (SYNTHROID/LEVOTHROID) 112 MCG tablet Take 1 tablet (112 mcg) by mouth daily 90 tablet 1     Prenatal Vit-Fe Fumarate-FA (PRENATAL MULTIVITAMIN W/IRON) 27-0.8 MG tablet Take 1 tablet by mouth daily 90 tablet 3     Vitamin D3 (CHOLECALCIFEROL) 25 mcg (1000 units) tablet Take 2 tablets (50 mcg) by mouth daily         Allergies, family, and social history were reviewed and documented as needed in EHR.     REVIEW OF SYSTEMS  A focused ROS was performed, with pertinent positives and negatives as noted in the HPI.    PHYSICAL EXAM  There were no vitals taken for this visit.  There is no height or weight on file to calculate BMI.  Constitutional: Patient is alert, oriented and appears in no acute distress.  Eyes: Eyes grossly normal to inspection, EOMI, no stare, lid lag, or retraction; no conjunctival injection.  ENMT: Lips are without lesions.   Neck: No visible goiter or neck mass.  Respiratory: No audible wheeze or cough. No visible cyanosis. No visible increased work of breathing.  Neurological: Alert and oriented times 3.  Cranial nerves grossly intact.      DATA REVIEW  Each of the following laboratory and/or imaging studies were reviewed.    Component      Latest Ref Rng & Units 11/7/2022 1/19/2023   TSH      0.40 - 4.00 mU/L 0.02 (L)    T4 Free      0.76 - 1.46 ng/dL 1.40    TSH      0.30 - 4.20 uIU/mL  0.55       ASSESSMENT  1.  Hypothyroidism.  With prior diagnosis of Hashimoto's thyroiditis.  Biochemically and clinically euthyroid, based on previsit labs and our discussion today.  Would continue current dose of levothyroxine without changes.  Check thyroid function tests in 3 months, follow-up in 6 months.  We discussed updating me if noting new symptoms concerning for thyroid disease or if  she contemplates pregnancy for preconception planning: At present, Ms. Cazares has no plans for conception in the future, on Nexplanon for contraception.  We also rediscussed pathophysiology of Hashimoto's thyroiditis.    PLAN  -Continue levothyroxine 112 mcg daily  -Labs in 3 months  -Return for a follow-up visit in 6 months (virtual visit is fine)  -We will communicate results via MyChart, or if needed by phone      Orders Placed This Encounter   Procedures     TSH with free T4 reflex         Video-Visit Details    Type of service:  Video Visit    Video Start Time: 12:29 PM  Video End Time: 12:44 PM    Physician location: Off site    I spent a total of 30 minutes on the date of encounter reviewing medical records, evaluating the patient, coordinating care and documenting in the EHR, as detailed above.      Platform used for Video Visit: Javid Gaviria MD   Division of Diabetes, Endocrinology and Metabolism  Department of Medicine       Staff made aware

## 2023-01-30 ENCOUNTER — HEALTH MAINTENANCE LETTER (OUTPATIENT)
Age: 33
End: 2023-01-30

## 2023-03-01 ENCOUNTER — LAB (OUTPATIENT)
Dept: LAB | Facility: CLINIC | Age: 33
End: 2023-03-01
Payer: COMMERCIAL

## 2023-03-01 DIAGNOSIS — Z79.899 HIGH RISK MEDICATION USE: ICD-10-CM

## 2023-03-01 DIAGNOSIS — R76.8 POSITIVE ANA (ANTINUCLEAR ANTIBODY): ICD-10-CM

## 2023-03-01 DIAGNOSIS — M19.90 INFLAMMATORY ARTHRITIS: ICD-10-CM

## 2023-03-01 LAB
ERYTHROCYTE [DISTWIDTH] IN BLOOD BY AUTOMATED COUNT: 14.3 % (ref 10–15)
HCT VFR BLD AUTO: 44.3 % (ref 35–47)
HGB BLD-MCNC: 14 G/DL (ref 11.7–15.7)
MCH RBC QN AUTO: 28.3 PG (ref 26.5–33)
MCHC RBC AUTO-ENTMCNC: 31.6 G/DL (ref 31.5–36.5)
MCV RBC AUTO: 90 FL (ref 78–100)
PLATELET # BLD AUTO: 168 10E3/UL (ref 150–450)
RBC # BLD AUTO: 4.95 10E6/UL (ref 3.8–5.2)
WBC # BLD AUTO: 6.5 10E3/UL (ref 4–11)

## 2023-03-01 PROCEDURE — 36415 COLL VENOUS BLD VENIPUNCTURE: CPT

## 2023-03-01 PROCEDURE — 85027 COMPLETE CBC AUTOMATED: CPT

## 2023-03-24 ENCOUNTER — MYC REFILL (OUTPATIENT)
Dept: RHEUMATOLOGY | Facility: CLINIC | Age: 33
End: 2023-03-24
Payer: COMMERCIAL

## 2023-03-24 DIAGNOSIS — R76.8 POSITIVE ANA (ANTINUCLEAR ANTIBODY): ICD-10-CM

## 2023-03-24 DIAGNOSIS — M19.90 INFLAMMATORY ARTHRITIS: ICD-10-CM

## 2023-03-24 RX ORDER — HYDROXYCHLOROQUINE SULFATE 200 MG/1
200 TABLET, FILM COATED ORAL 2 TIMES DAILY
Qty: 180 TABLET | Refills: 0 | Status: CANCELLED | OUTPATIENT
Start: 2023-03-24

## 2023-03-27 ENCOUNTER — MYC REFILL (OUTPATIENT)
Dept: RHEUMATOLOGY | Facility: CLINIC | Age: 33
End: 2023-03-27
Payer: COMMERCIAL

## 2023-03-27 DIAGNOSIS — R76.8 POSITIVE ANA (ANTINUCLEAR ANTIBODY): ICD-10-CM

## 2023-03-27 DIAGNOSIS — M19.90 INFLAMMATORY ARTHRITIS: ICD-10-CM

## 2023-03-28 RX ORDER — HYDROXYCHLOROQUINE SULFATE 200 MG/1
200 TABLET, FILM COATED ORAL DAILY
Qty: 60 TABLET | Refills: 0 | Status: SHIPPED | OUTPATIENT
Start: 2023-03-28 | End: 2023-05-29

## 2023-03-28 NOTE — TELEPHONE ENCOUNTER
Sent patient Bre's message from duplicate encounter.  Corinne Nicole RN on 3/28/2023 at 10:08 AM

## 2023-03-28 NOTE — TELEPHONE ENCOUNTER
Okay for 2 month refill. She needs to have an in person appointment in May for further refills. Next labs should be done in mid April.    Bre Chanel PA-C on 3/28/2023 at 9:45 AM

## 2023-03-28 NOTE — TELEPHONE ENCOUNTER
Last OV 11/18/22. Per OV note:    Plan:         1. Schedule follow-up with Bre Chanel PA-C in 3 months.   2. Labs: WILMER panel, dsDNA, Urine, Scl-70, Centromere antibody, complement levels  3. Medication recommendations:             a. Hydroxychloroquine: will start you on 200mg 2 times daily  b. -While on hydroxychloroquine it is important that you get an eye exam annually. Make sure to let your eye provider know that you are taking this medication.   c. -Labs (AST/ALT, creatinine, CBC with platelets) should be monitored every 3 months  d. # Hydroxychloroquine (Plaquenil)      Patient overdue for 3 month f/u appointment. No appointment currently scheduled. Labs most recently done on 1/19/23.  Please advise for refill request.    Corinne Nicole RN on 3/28/2023 at 9:22 AM

## 2023-03-29 NOTE — TELEPHONE ENCOUNTER
Patient read message:    Medication Renewal Request  Message [id number]  From   Corinne Nicole, MIGUEL To   Denise Cazares Sent and Delivered   3/28/2023 10:08 AM   Last Read in MyChart   3/28/2023 11:33 PM by Denise Cazares       Closing note.  Corinne Nicole RN on 3/29/2023 at 11:02 AM

## 2023-05-08 NOTE — PROGRESS NOTES
Denise did not return to physical therapy after initial evaluation to complete recommended course of physical therapy. Refer to initial evaluation as discharge summary.

## 2023-05-29 NOTE — PROGRESS NOTES
Rheumatology Clinic Visit  [facility]  RAOUL Kent     Denise Cazares MRN# [id number]   YOB: 1990 Age: 33 year old   Date of Visit: 05/30/2023  Primary care provider: [facility]          Assessment and Plan:     1.  Inflammatory arthritis  2.  Positive YOSELIN  3.  High-risk medication use    Patient presents today for follow-up regarding her inflammatory arthritis.  She does think that there has been an improvement since the initiation of the hydroxychloroquine.  She still does get periodic pain in her knees but it does seem to be more bearable.  Previous laboratory evaluations were reviewed, results below.    As she is noticing an improvement in the symptoms, we will increase the hydroxychloroquine to 1 tablet twice daily.  Check labs in 1 month.  If she continues to feel symptomatic would recommend discussing a synovium biopsy of the knee to further evaluate.  We will check labs in 1 month and then again every 3 months after that.  We will also recheck the RNP antibody that was equivocal in December.        Plan:   1. Schedule follow-up with Bre Chanel PA-C in 6 months.   2. Labs: CBC, creatinine, albumin, AST, ALT in 1 month and then every 3 months; RNP in 1 month  3. Medication recommendations:   a. Hydroxychloroquine: increase dose to 200mg take 1 tablet twice daily  b. -While on hydroxychloroquine it is important that you get an eye exam annually. Make sure to let your eye provider know that you are taking this medication.   c. -Labs (AST/ALT, creatinine, CBC with platelets) should be monitored every 3 months    RAOUL Kent  Rheumatology         History of Present Illness:   Denise Cazares presents for evaluation of polyarthralgia.      Interval history May 30, 2023:  She still has some pain in the left knee. At times she has a hard time walking, but overall she feels it is bearable. She does feel the Hydroxychloroquine is helping. She did have  "a week where she did not take it and had more pain. No skin rashes or GI upset. She did have some GI upset at the beginning, but she feels this has leveled off.     Interval history November 18, 2022:    She states that she spoke with her orthopedic provider and it did not think that anything needed to be done surgically.     She states that she has a hard time going up and down the stairs. She feels that her knee \"alexandru\". She woke up this morning with discomfort in her left knee. No skin rashes. No dry eyes or dry mouth. She has had some post-partum hair loss.     She states that her wrists have been okay. When she lays down and then goes to get up, she has pain in her heel, so she does limp until it resolves. She reports her  saying that she walks \"weird\". No Raynaud's. No shortness of breath, chest pain or pleurisy. No recent infections. No fevers.    HPI from Consult 07/25/2022:  She states that in April, she started to have pain in her left sciatic nerve. She then started to have pain on the top of her left foot by the ankle. She has a daughter with leukemia and was at the hospital. She then started to have her left knee swell. She had previously had swelling in ehr right knee with fluid removed. She got the liquid removed on the left. 1 week later, she needed it removed again. She then started to have pain in her wrists. She had a positive lyme and was given amoxicillin. She saw infectious disease but was told they were uncertain if she had lyme. 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 She had further blood showing antibodies for lyme. She feels that both of her knees are swollen and the bottom of her heel. She has pain when she first stands up on the heel and that resolves after walking a few steps. She states that it is hard to bend her knees in the morning. She also has hypermobile joints as well and there is question on if this is causing \"wear and tear\". She has seen orthopedics and PT " was recommended. She is able to walk better since completing the antibiotic, but she continues to feel that there is a lot of swelling in her joints. She feels that she is worse in the mornings after periods of rest. No skin rashes. She has some fatigue, but she feels this is related to all the stress with her daughter.     Her mother complains about pain in her hands but is unsure if there is RA. No family history of lupus. No personal or family history of psoriasis, ulcerative colitis or crohn's.          Review of Systems:     Constitutional: negative  Skin: negative  Eyes: negative  Ears/Nose/Throat: negative  Respiratory: No shortness of breath, dyspnea on exertion, cough, or hemoptysis  Cardiovascular: negative  Gastrointestinal: negative  Genitourinary: negative  Musculoskeletal: as above  Neurologic: negative  Psychiatric: negative  Hematologic/Lymphatic/Immunologic: negative  Endocrine: negative         Active Problem List:     Patient Active Problem List    Diagnosis Date Noted     Nexplanon in place 12/14/2021 LT Arm 12/14/2021     Priority: Medium     Nexplanon placed 12/14/2021  LOT# [id number]  Exp: 04/09/2024  NDC# 52925-681-22    Dinora Morrison on 12/14/2021 at 9:53 AM           Class 2 obesity in adult 10/12/2021     Priority: Medium     Right knee pain 02/26/2021     Priority: Medium     Hypothyroidism      Priority: Medium            Past Medical History:     Past Medical History:   Diagnosis Date     Anemia      Chickenpox      Endometrial polyp      Hypothyroidism      Past Surgical History:   Procedure Laterality Date     AS HYSTEROSCOPY W ENDOMETRIAL BX/POLYPECTOMY W/WO D&C       LAPAROSCOPIC CHOLECYSTECTOMY       TONSILLECTOMY              Social History:     Social History     Socioeconomic History     Marital status:      Spouse name: Not on file     Number of children: Not on file     Years of education: Not on file     Highest education level: Not on file   Occupational  History     Not on file   Tobacco Use     Smoking status: Never     Smokeless tobacco: Never   Vaping Use     Vaping status: Never Used   Substance and Sexual Activity     Alcohol use: Not Currently     Comment: occasionally-quit with pregnnacy     Drug use: Never     Sexual activity: Yes     Partners: Male     Birth control/protection: None     Comment: Dominick   Other Topics Concern     Not on file   Social History Narrative     Not on file     Social Determinants of Health     Financial Resource Strain: Not on file   Food Insecurity: Not on file   Transportation Needs: Not on file   Physical Activity: Not on file   Stress: Not on file   Social Connections: Not on file   Intimate Partner Violence: Not on file   Housing Stability: Not on file          Family History:     Family History   Problem Relation Age of Onset     Diabetes Mother      Hypertension Mother      No Known Problems Father      Other - See Comments Maternal Grandfather         MVA            Allergies:   No Known Allergies         Medications:     Current Outpatient Medications   Medication Sig Dispense Refill     etonogestrel (NEXPLANON) 68 MG IMPL 1 each (68 mg) by Subdermal route once       hydroxychloroquine (PLAQUENIL) 200 MG tablet Take 1 tablet (200 mg) by mouth daily 60 tablet 0     levothyroxine (SYNTHROID/LEVOTHROID) 112 MCG tablet Take 1 tablet (112 mcg) by mouth daily 90 tablet 1     Vitamin D3 (CHOLECALCIFEROL) 25 mcg (1000 units) tablet Take 2 tablets (50 mcg) by mouth daily              Physical Exam:   currently breastfeeding.  Wt Readings from Last 6 Encounters:   08/11/22 80.7 kg (178 lb)   07/25/22 77.6 kg (171 lb)   07/15/22 77.7 kg (171 lb 6.4 oz)   05/23/22 75.8 kg (167 lb)   05/20/22 75.8 kg (167 lb)   05/16/22 76.2 kg (167 lb 14.4 oz)     Constitutional: well-developed, appearing stated age; cooperative  Eyes: nl  conjunctiva, sclera  ENT: nl external ears, nose, hearing, lips,   Resp: No shortness of breath with normal  conversation  Psych: nl judgement, orientation, memory, affect.           Data:   Imaging:  MRI left knee 5/31/2022  Impression:   1. Nodular intra-articular foci as detailed above. Differential would   include infectious or inflammatory etiology. Nodular synovitis and/or   PVNS are also included in the differential. Consider referral to   orthopedic oncology at the [facility].     2. Repeat MRI with gadolinium and susceptibility weighted imaging   could be considered in 3-6 months.     Xray left foot 5/3/2022  IMPRESSION: No fractures are evident. Normal joint spacing and  alignment. The soft tissues are unremarkable.     MRI right knee 8/14/22  Impression: Large knee joint effusion with area of mild synovitis.  Given contralateral knee with possible synovitis finding, this may be  systemic condition. Especially with positive lyme antibodies,  differential consideration include lyme arthritis. No evidence of  erosion, reactive osteitis.     Laboratory:  5/11/2022  Uric acid 2.1  Rheumatoid factor 7  YOSELIN negative    5/18/2022  CCP antibody 2.6  CBC within normal parameters    6/24/2022  TSH 3.9, T4 0.76    7/15/2022  Creatinine 0.56, GFR greater than 80  ALT 32, AST 20  CRP 6.3  Vitamin D 18  Sed rate 22  LYme confirm IgG reactive, 9.0      Fluid analysis: 5/16/2022: 6K WBC, 38% PMNs, 36% lymphocytes. Cultures on broth showed likely contaminate of diptheroids, globicatella sanuinis. Lyme analysis showed reactive IgG    8/11/2022   CRP less than 2.9  Rheumatoid factor 8  Sed rate 15  YOSELIN borderline positive with a speckled pattern and a titer of 1: 80    12/1/2022  Creatinine 0.49, GFR greater than 90  Albumin 3.9  ALT 92, AST 30  Syndrome antibody negative  CRP 3.7  CBC within normal limits  Sed rate 10  C3 141, C4 30  Double-stranded DNA negative  RNP equivocal  SCL 70 negative  Smith antibody negative  SSA and SSB negative    1/19/2023  Creatinine 0.53, GFR greater than 90  Albumin 4.4  ALT 40, AST  24  CRP less than 3.0  TSH 0.55  CBC within normal limits  Sed rate 11    5/30/2023  Creatinine 0.55, GFR greater than 90  Albumin 4.5  ALT 31, AST 18  CBC within normal limits  TSH 4.40

## 2023-05-30 ENCOUNTER — OFFICE VISIT (OUTPATIENT)
Dept: RHEUMATOLOGY | Facility: CLINIC | Age: 33
End: 2023-05-30
Payer: COMMERCIAL

## 2023-05-30 ENCOUNTER — LAB (OUTPATIENT)
Dept: LAB | Facility: CLINIC | Age: 33
End: 2023-05-30
Payer: COMMERCIAL

## 2023-05-30 VITALS
BODY MASS INDEX: 39.82 KG/M2 | OXYGEN SATURATION: 97 % | SYSTOLIC BLOOD PRESSURE: 119 MMHG | RESPIRATION RATE: 20 BRPM | HEIGHT: 60 IN | DIASTOLIC BLOOD PRESSURE: 77 MMHG | WEIGHT: 202.8 LBS | HEART RATE: 66 BPM | TEMPERATURE: 97.9 F

## 2023-05-30 DIAGNOSIS — R76.8 POSITIVE ANA (ANTINUCLEAR ANTIBODY): ICD-10-CM

## 2023-05-30 DIAGNOSIS — Z79.899 HIGH RISK MEDICATION USE: ICD-10-CM

## 2023-05-30 DIAGNOSIS — M19.90 INFLAMMATORY ARTHRITIS: Primary | ICD-10-CM

## 2023-05-30 DIAGNOSIS — M19.90 INFLAMMATORY ARTHRITIS: ICD-10-CM

## 2023-05-30 DIAGNOSIS — E03.9 HYPOTHYROIDISM, UNSPECIFIED TYPE: ICD-10-CM

## 2023-05-30 LAB
ALBUMIN SERPL BCG-MCNC: 4.5 G/DL (ref 3.5–5.2)
ALT SERPL W P-5'-P-CCNC: 31 U/L (ref 10–35)
AST SERPL W P-5'-P-CCNC: 18 U/L (ref 10–35)
CREAT SERPL-MCNC: 0.55 MG/DL (ref 0.51–0.95)
ERYTHROCYTE [DISTWIDTH] IN BLOOD BY AUTOMATED COUNT: 13.6 % (ref 10–15)
GFR SERPL CREATININE-BSD FRML MDRD: >90 ML/MIN/1.73M2
HCT VFR BLD AUTO: 42.8 % (ref 35–47)
HGB BLD-MCNC: 14 G/DL (ref 11.7–15.7)
MCH RBC QN AUTO: 29 PG (ref 26.5–33)
MCHC RBC AUTO-ENTMCNC: 32.7 G/DL (ref 31.5–36.5)
MCV RBC AUTO: 89 FL (ref 78–100)
PLATELET # BLD AUTO: 195 10E3/UL (ref 150–450)
RBC # BLD AUTO: 4.83 10E6/UL (ref 3.8–5.2)
T4 FREE SERPL-MCNC: 0.77 NG/DL (ref 0.9–1.7)
TSH SERPL DL<=0.005 MIU/L-ACNC: 4.4 UIU/ML (ref 0.3–4.2)
WBC # BLD AUTO: 5.9 10E3/UL (ref 4–11)

## 2023-05-30 PROCEDURE — 99214 OFFICE O/P EST MOD 30 MIN: CPT | Performed by: PHYSICIAN ASSISTANT

## 2023-05-30 PROCEDURE — 84439 ASSAY OF FREE THYROXINE: CPT

## 2023-05-30 PROCEDURE — 84460 ALANINE AMINO (ALT) (SGPT): CPT

## 2023-05-30 PROCEDURE — 84450 TRANSFERASE (AST) (SGOT): CPT

## 2023-05-30 PROCEDURE — 82040 ASSAY OF SERUM ALBUMIN: CPT

## 2023-05-30 PROCEDURE — 85027 COMPLETE CBC AUTOMATED: CPT

## 2023-05-30 PROCEDURE — 82565 ASSAY OF CREATININE: CPT

## 2023-05-30 PROCEDURE — 84443 ASSAY THYROID STIM HORMONE: CPT

## 2023-05-30 PROCEDURE — 36415 COLL VENOUS BLD VENIPUNCTURE: CPT

## 2023-05-30 RX ORDER — HYDROXYCHLOROQUINE SULFATE 200 MG/1
200 TABLET, FILM COATED ORAL 2 TIMES DAILY
Qty: 180 TABLET | Refills: 0 | Status: SHIPPED | OUTPATIENT
Start: 2023-05-30 | End: 2023-10-31

## 2023-05-30 ASSESSMENT — PAIN SCALES - GENERAL: PAINLEVEL: MILD PAIN (3)

## 2023-05-30 NOTE — PATIENT INSTRUCTIONS
After Visit Instructions:     Thank you for coming to [facility] for your care. It is my goal to partner with you to help you reach your optimal state of health.       Plan:     Schedule follow-up with Bre Chanel PA-C in 6 months.   Labs: CBC, creatinine, albumin, AST, ALT in 1 month and then every 3 months; RNP in 1 month  Medication recommendations:   Hydroxychloroquine: increase dose to 200mg take 1 tablet twice daily  -While on hydroxychloroquine it is important that you get an eye exam annually. Make sure to let your eye provider know that you are taking this medication.   -Labs (AST/ALT, creatinine, CBC with platelets) should be monitored every 3 months           Bre Chanel PA-C  [facility]  [facility]    Contact information: [facility]  Clinic Number:  [phone]  Please call or send a [employer] message with any questions about your care

## 2023-06-10 ENCOUNTER — MYC MEDICAL ADVICE (OUTPATIENT)
Dept: ENDOCRINOLOGY | Facility: CLINIC | Age: 33
End: 2023-06-10
Payer: COMMERCIAL

## 2023-06-10 DIAGNOSIS — E03.9 HYPOTHYROIDISM, UNSPECIFIED TYPE: ICD-10-CM

## 2023-06-12 RX ORDER — LEVOTHYROXINE SODIUM 112 UG/1
112 TABLET ORAL DAILY
Qty: 30 TABLET | Refills: 3 | Status: SHIPPED | OUTPATIENT
Start: 2023-06-12 | End: 2023-10-30

## 2023-08-21 ENCOUNTER — OFFICE VISIT (OUTPATIENT)
Dept: OBGYN | Facility: CLINIC | Age: 33
End: 2023-08-21
Payer: COMMERCIAL

## 2023-08-21 ENCOUNTER — TELEPHONE (OUTPATIENT)
Dept: OBGYN | Facility: CLINIC | Age: 33
End: 2023-08-21

## 2023-08-21 ENCOUNTER — HOSPITAL ENCOUNTER (OUTPATIENT)
Facility: CLINIC | Age: 33
End: 2023-08-21
Attending: OBSTETRICS & GYNECOLOGY | Admitting: OBSTETRICS & GYNECOLOGY
Payer: COMMERCIAL

## 2023-08-21 ENCOUNTER — PREP FOR PROCEDURE (OUTPATIENT)
Dept: OBGYN | Facility: CLINIC | Age: 33
End: 2023-08-21

## 2023-08-21 VITALS
SYSTOLIC BLOOD PRESSURE: 116 MMHG | BODY MASS INDEX: 41.07 KG/M2 | WEIGHT: 209.2 LBS | DIASTOLIC BLOOD PRESSURE: 76 MMHG | RESPIRATION RATE: 12 BRPM | HEART RATE: 67 BPM | HEIGHT: 60 IN | TEMPERATURE: 97.5 F

## 2023-08-21 DIAGNOSIS — Z30.09 STERILIZATION CONSULT: Primary | ICD-10-CM

## 2023-08-21 DIAGNOSIS — Z30.2 ENCOUNTER FOR STERILIZATION: Primary | ICD-10-CM

## 2023-08-21 PROCEDURE — 99213 OFFICE O/P EST LOW 20 MIN: CPT | Performed by: OBSTETRICS & GYNECOLOGY

## 2023-08-21 RX ORDER — CEFAZOLIN SODIUM 2 G/100ML
2 INJECTION, SOLUTION INTRAVENOUS SEE ADMIN INSTRUCTIONS
Status: CANCELLED | OUTPATIENT
Start: 2023-08-21

## 2023-08-21 RX ORDER — CEFAZOLIN SODIUM 2 G/100ML
2 INJECTION, SOLUTION INTRAVENOUS
Status: CANCELLED | OUTPATIENT
Start: 2023-08-21

## 2023-08-21 RX ORDER — METRONIDAZOLE 500 MG/100ML
500 INJECTION, SOLUTION INTRAVENOUS ONCE
Status: CANCELLED | OUTPATIENT
Start: 2023-08-21

## 2023-08-21 RX ORDER — ACETAMINOPHEN 325 MG/1
975 TABLET ORAL ONCE
Status: CANCELLED | OUTPATIENT
Start: 2023-08-21 | End: 2023-08-21

## 2023-08-21 NOTE — TELEPHONE ENCOUNTER
"[id number]  Denise Cazares    You are now scheduled for surgery at [facility].  Below are the details for your surgery.  Please read the \"Preparing for Your Surgery\" instructions and let us know if you have any questions.    Type of surgery: SALPINGECTOMY, VAGINAL NATURAL ORIFICE TRANSLUMINAL ENDOSCOPIC APPROACH   Surgeon:  Fozia Montgomery DO  Location of surgery: [facility] OR    Date of surgery: 9/27/23    Time: 8:40 am   Arrival Time: 7:40 am    Time can change, to be confirmed a couple of days prior by pre-op surgery nurse.    Pre-Op Appt Date: Patient to schedule with a PCP or Family Practice Provider within 30 days to the surgery. Pt will call to schedule.  Post-Op Appt Date: 11/2/23   Time: 8:30 am    Packet sent out: Yes  Pre-cert/Authorization completed:  TBD by Financial Securing Office.   MA Sterilization/Hysterectomy Acknowledgment Consent signed: Yes    [facility]  [phone]    Fax: [phone]  Same Day Surgery [phone]  Fax: [phone]  [facility] [phone]    "

## 2023-08-21 NOTE — NURSING NOTE
Initial /76 (BP Location: Right arm, Patient Position: Sitting, Cuff Size: Adult Regular)   Pulse 67   Temp 97.5  F (36.4  C) (Tympanic)   Resp 12   Ht 1.524 m (5')   Wt 94.9 kg (209 lb 3.2 oz)   BMI 40.86 kg/m   Estimated body mass index is 40.86 kg/m  as calculated from the following:    Height as of this encounter: 1.524 m (5').    Weight as of this encounter: 94.9 kg (209 lb 3.2 oz). .

## 2023-08-21 NOTE — PROGRESS NOTES
[facility]    Gynecology Office Note    CC:   Chief Complaint   Patient presents with    Consult        HPI: Denise Cazares is a 33 year old  who presents for sterilization counseling. Her and her  do not want any more children, seeking tubal ligation and likely also vasectomy. Her youngest daughter was diagnosed with leukemia at 4 months of age, ongoing treatment. Can not go through that again. Was undecided right after she was born but has had time to think about it and feels sure. No other concerns or questions today.    GYN Hx:     No LMP recorded. Patient has had an implant.    History of heavy, irregular menses. Currently has amenorrhea with Nexplanon in place.     Last Pap Smear:   Lab Results   Component Value Date    PAP NIL 2021       ROS: A 10 pt ROS was completed and found to be otherwise negative unless mentioned in the HPI.     PMH:   Past Medical History:   Diagnosis Date    Anemia     Chickenpox     Endometrial polyp     Hypothyroidism        PSHx:   Past Surgical History:   Procedure Laterality Date    AS HYSTEROSCOPY W ENDOMETRIAL BX/POLYPECTOMY W/WO D&C      LAPAROSCOPIC CHOLECYSTECTOMY      TONSILLECTOMY         OBHx:   OB History    Para Term  AB Living   4 3 3 0 1 3   SAB IAB Ectopic Multiple Live Births   0 0 0 0 3      # Outcome Date GA Lbr Dk/2nd Weight Sex Delivery Anes PTL Lv   4 Term 10/13/21 39w0d 04:22 / 00:28 3.6 kg (7 lb 15 oz) F Vag-Spont EPI N DAVID      Name: ANNFEMALE-DENISE      Apgar1: 8  Apgar5: 8   3 Term 13 40w0d  3.459 kg (7 lb 10 oz) F    DAVID      Name: Shania   2 Term 11 37w0d  2.948 kg (6 lb 8 oz) F    DAVID      Name: Arieliz   1 Molar               Birth Comments: blighted ovum       Medications:   etonogestrel (NEXPLANON) 68 MG IMPL, 1 each (68 mg) by Subdermal route once  hydroxychloroquine (PLAQUENIL) 200 MG tablet, Take 1 tablet (200 mg) by mouth 2 times daily  levothyroxine  (SYNTHROID/LEVOTHROID) 112 MCG tablet, Take 1 tablet (112 mcg) by mouth daily  Vitamin D3 (CHOLECALCIFEROL) 25 mcg (1000 units) tablet, Take 2 tablets (50 mcg) by mouth daily    bupivacaine (MARCAINE) 0.25 % injection 4 mL        Allergies:    No Known Allergies    Social History:   Social History     Socioeconomic History    Marital status:      Spouse name: Not on file    Number of children: Not on file    Years of education: Not on file    Highest education level: Not on file   Occupational History    Not on file   Tobacco Use    Smoking status: Never    Smokeless tobacco: Never   Vaping Use    Vaping Use: Never used   Substance and Sexual Activity    Alcohol use: Not Currently     Comment: occasionally-quit with pregnnacy    Drug use: Never    Sexual activity: Yes     Partners: Male     Birth control/protection: None     Comment: Dominick   Other Topics Concern    Not on file   Social History Narrative    Not on file     Social Determinants of Health     Financial Resource Strain: Not on file   Food Insecurity: Not on file   Transportation Needs: Not on file   Physical Activity: Not on file   Stress: Not on file   Social Connections: Not on file   Intimate Partner Violence: Not on file   Housing Stability: Not on file         Family History:   Family History   Problem Relation Age of Onset    Diabetes Mother     Hypertension Mother     No Known Problems Father     Other - See Comments Maternal Grandfather         MVA       Physical Exam:   Vitals:    08/21/23 0825   BP: 116/76   BP Location: Right arm   Patient Position: Sitting   Cuff Size: Adult Regular   Pulse: 67   Resp: 12   Temp: 97.5  F (36.4  C)   TempSrc: Tympanic   Weight: 94.9 kg (209 lb 3.2 oz)   Height: 1.524 m (5')      Estimated body mass index is 40.86 kg/m  as calculated from the following:    Height as of this encounter: 1.524 m (5').    Weight as of this encounter: 94.9 kg (209 lb 3.2 oz).    General appearance: well-hydrated, A&O x 3, no  apparent distress  Lungs: Equal expansion bilaterally, no accessory muscle use  Heart: No heaves or thrills.   Constitutional: See vitals  Extremities: no edema  Neuro: CN II-XII grossly intact      Assessment and Plan:     Encounter Diagnosis   Name Primary?    Sterilization consult Yes     33 year old yo  who presents for contraception counseling. She has Nexplanon in place for contraception as well as management of heavy menstrual bleeding. She is interested in permanent sterilization. Discussed surgical approaches and the rational for salpingectomy. Discussed risks of this procedure namely bleeding, infection and intraabdominal injury. Discussed the irreversible nature of sterilization, pregnancy would require IVF, or adoption an option for expanding family. After this discussion, the patient would like to proceed with salpingectomy via vNOTES. Federal tubal papers were signed today and scanned in the EMR. A copy was also given to the patient. Schedule pre-op with PCP.     She would like to keep the Nexplanon for menstrual control.       Health maintenance: pap smear up to date  Return to clinic post operatively.      Fozia Montgomery DO

## 2023-09-22 NOTE — TELEPHONE ENCOUNTER
Pt called - states she wants to cancel for now.  Her  got insurance and wants to proceed with vasectomy.  Pt states she will do this surgery at another time - will call back when ready.  Will need new orders.  FYI to Fozia Montgomery, DO   SDS informed.  [place] updated.      -Carmen ROBERTS [facility]  [facility]

## 2023-10-24 ENCOUNTER — LAB (OUTPATIENT)
Dept: LAB | Facility: CLINIC | Age: 33
End: 2023-10-24
Payer: COMMERCIAL

## 2023-10-24 DIAGNOSIS — R76.8 POSITIVE ANA (ANTINUCLEAR ANTIBODY): ICD-10-CM

## 2023-10-24 DIAGNOSIS — Z79.899 HIGH RISK MEDICATION USE: ICD-10-CM

## 2023-10-24 DIAGNOSIS — E03.9 HYPOTHYROIDISM, UNSPECIFIED TYPE: ICD-10-CM

## 2023-10-24 DIAGNOSIS — M19.90 INFLAMMATORY ARTHRITIS: ICD-10-CM

## 2023-10-24 LAB
ALBUMIN SERPL BCG-MCNC: 4.2 G/DL (ref 3.5–5.2)
ALT SERPL W P-5'-P-CCNC: 129 U/L (ref 0–50)
AST SERPL W P-5'-P-CCNC: 57 U/L (ref 0–45)
CREAT SERPL-MCNC: 0.59 MG/DL (ref 0.51–0.95)
EGFRCR SERPLBLD CKD-EPI 2021: >90 ML/MIN/1.73M2
ERYTHROCYTE [DISTWIDTH] IN BLOOD BY AUTOMATED COUNT: 13.8 % (ref 10–15)
HCT VFR BLD AUTO: 38 % (ref 35–47)
HGB BLD-MCNC: 12.1 G/DL (ref 11.7–15.7)
MCH RBC QN AUTO: 27.9 PG (ref 26.5–33)
MCHC RBC AUTO-ENTMCNC: 31.8 G/DL (ref 31.5–36.5)
MCV RBC AUTO: 88 FL (ref 78–100)
PLATELET # BLD AUTO: 208 10E3/UL (ref 150–450)
RBC # BLD AUTO: 4.34 10E6/UL (ref 3.8–5.2)
T4 FREE SERPL-MCNC: 0.91 NG/DL (ref 0.9–1.7)
TSH SERPL DL<=0.005 MIU/L-ACNC: 8.82 UIU/ML (ref 0.3–4.2)
WBC # BLD AUTO: 7.3 10E3/UL (ref 4–11)

## 2023-10-24 PROCEDURE — 82565 ASSAY OF CREATININE: CPT

## 2023-10-24 PROCEDURE — 82040 ASSAY OF SERUM ALBUMIN: CPT

## 2023-10-24 PROCEDURE — 84450 TRANSFERASE (AST) (SGOT): CPT

## 2023-10-24 PROCEDURE — 36415 COLL VENOUS BLD VENIPUNCTURE: CPT

## 2023-10-24 PROCEDURE — 84439 ASSAY OF FREE THYROXINE: CPT

## 2023-10-24 PROCEDURE — 86235 NUCLEAR ANTIGEN ANTIBODY: CPT

## 2023-10-24 PROCEDURE — 85027 COMPLETE CBC AUTOMATED: CPT

## 2023-10-24 PROCEDURE — 84460 ALANINE AMINO (ALT) (SGPT): CPT

## 2023-10-24 PROCEDURE — 84443 ASSAY THYROID STIM HORMONE: CPT

## 2023-10-26 DIAGNOSIS — E03.9 HYPOTHYROIDISM, UNSPECIFIED TYPE: Primary | ICD-10-CM

## 2023-10-27 LAB
U1 SNRNP IGG SER IA-ACNC: 4.7 U/ML
U1 SNRNP IGG SER IA-ACNC: NEGATIVE

## 2023-10-30 ENCOUNTER — VIRTUAL VISIT (OUTPATIENT)
Dept: ENDOCRINOLOGY | Facility: CLINIC | Age: 33
End: 2023-10-30
Payer: COMMERCIAL

## 2023-10-30 DIAGNOSIS — E03.9 HYPOTHYROIDISM, UNSPECIFIED TYPE: Primary | ICD-10-CM

## 2023-10-30 PROCEDURE — 99214 OFFICE O/P EST MOD 30 MIN: CPT | Mod: VID | Performed by: INTERNAL MEDICINE

## 2023-10-30 RX ORDER — LEVOTHYROXINE SODIUM 112 UG/1
112 TABLET ORAL DAILY
Qty: 30 TABLET | Refills: 3 | Status: SHIPPED | OUTPATIENT
Start: 2023-10-30 | End: 2024-01-04 | Stop reason: DRUGHIGH

## 2023-10-30 ASSESSMENT — ENCOUNTER SYMPTOMS
HEADACHES: 0
HEMATURIA: 0
FREQUENCY: 0
PARESTHESIAS: 0
DIZZINESS: 0
ARTHRALGIAS: 1
DYSURIA: 0
SORE THROAT: 0
CHILLS: 0
FEVER: 0
NAUSEA: 0
MYALGIAS: 0
CONSTIPATION: 0
ABDOMINAL PAIN: 0
SHORTNESS OF BREATH: 0
NERVOUS/ANXIOUS: 0
PALPITATIONS: 0
DIARRHEA: 0
HEMATOCHEZIA: 0
BREAST MASS: 0
EYE PAIN: 0
JOINT SWELLING: 0
HEARTBURN: 0
WEAKNESS: 0
COUGH: 0

## 2023-10-30 NOTE — PATIENT INSTRUCTIONS
-Continue levothyroxine 112 mcg daily; can try setting alarm on cell phone as a reminder of levothyroxine dose, can also try using a pillbox to set up medications for each day (it is fine to take a catch-up dose the next day if prior day's dose is missed)  -Labs in 2 months  -Return for a follow-up visit in 6 months (virtual visit is fine)  -We will communicate results via [email or web address], or if needed by phone

## 2023-10-30 NOTE — LETTER
10/30/2023       RE: Denise Cazares  [address] [facility] [address]     Dear Colleague,    Thank you for referring your patient, Denise Cazares, to the [facility] [place] at [facility]. Please see a copy of my visit note below.      ENDOCRINOLOGY VIDEO FOLLOW-UP      HISTORY OF PRESENT ILLNESS    Denise Cazares is seen in follow-up via a billable video visit.     She has continued on levothyroxine 112 mcg daily.  However, it has been difficult to take levothyroxine consistently.  Caregiving for her daughter with medical needs along with the rest of her family consumes her time and she finds that she may sometimes forget taking levothyroxine.  She tried taking levothyroxine overnight but found that she may sometimes forget if she took levothyroxine overnight or not.    With less frequent use of levothyroxine, she has noted more fatigue.    Nexplanon remains in place and she has no menstrual cycles.  Her daughter is still breast-feeding, primarily for comfort.    No plans for conception in the future: Her  has an appointment in urology in 2023 to consider vasectomy.    Pertinent endocrine and related history:  1.  Hypothyroidism.  Diagnosed in  during pregnancy, attributed to Hashimoto's thyroiditis.  Has been on and off levothyroxine she since then, with interruption in medication due to insurance loss.  - (1 pregnancy ended with blighted ovum).  Has Nexplanon in place and is not having any menstrual bleeding on Nexplanon.  Does not plan to conceive in the future.    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  Youngest daughter has history of leukemia.  Lives in [place].      PAST MEDICAL HISTORY  Past Medical History:   Diagnosis Date    Anemia     Chickenpox     Endometrial polyp     Hypothyroidism        MEDICATIONS  Current  Outpatient Medications   Medication Sig Dispense Refill    etonogestrel (NEXPLANON) 68 MG IMPL 1 each (68 mg) by Subdermal route once      hydroxychloroquine (PLAQUENIL) 200 MG tablet Take 1 tablet (200 mg) by mouth 2 times daily 180 tablet 0    levothyroxine (SYNTHROID/LEVOTHROID) 112 MCG tablet Take 1 tablet (112 mcg) by mouth daily 30 tablet 3    Vitamin D3 (CHOLECALCIFEROL) 25 mcg (1000 units) tablet Take 2 tablets (50 mcg) by mouth daily         Allergies, family, and social history were reviewed and documented as needed in EHR.     REVIEW OF SYSTEMS  A focused ROS was performed, with pertinent positives and negatives as noted in the HPI.    PHYSICAL EXAM  There were no vitals taken for this visit.  There is no height or weight on file to calculate BMI.  Constitutional: Patient is alert, oriented and appears in no acute distress.  Eyes: Eyes grossly normal to inspection, EOMI, no stare, lid lag, or retraction; no conjunctival injection.  ENMT: Lips are without lesions.   Neck: No visible goiter or neck mass.  Respiratory: No audible wheeze or cough. No visible cyanosis. No visible increased work of breathing.  Neurological: Alert and oriented times 3.  Cranial nerves grossly intact.      DATA REVIEW  Each of the following laboratory and/or imaging studies were reviewed.    Component      Latest Ref Rng 10/24/2023  9:10 AM   TSH      0.30 - 4.20 uIU/mL 8.82 (H)    T4 Free      0.90 - 1.70 ng/dL 0.91       Legend:  (H) High    ASSESSMENT  1.  Hypothyroidism.  Biochemically mildly hypothyroid, has clinical symptoms suggestive of this also.  Difficulty with consistent use of levothyroxine due to other responsibilities.  We reviewed techniques that might be helpful in improving regularity of levothyroxine use.  We will reevaluate thyroid function tests in about 2 months after Ms. Stewart has been able to take levothyroxine consistently.    2.  Abnormal liver function tests.  Noted on screening rheumatology labs.   She has not taken Plaquenil for the past 2 to 3 months, does not take other medications which could potentially contribute to abnormal liver function tests.  Does not overuse Tylenol or alcohol.  Would recommend follow-up in primary care to further evaluate this abnormality.  Ms. Stewart wonders about the possibility of nonalcoholic steatohepatitis: This is a potential contributor and we discussed lifestyle changes that can be helpful in managing this.  We will check CMP and A1c with next lab draw.    PLAN  -Continue levothyroxine 112 mcg daily; can try setting alarm on cell phone as a reminder of levothyroxine dose, can also try using a pillbox to set up medications for each day (it is fine to take a catch-up dose the next day if prior day's dose is missed)  -Labs in 2 months  -Return for a follow-up visit in 6 months (virtual visit is fine)  -We will communicate results via [employer], or if needed by phone      Video-Visit Details    Type of service:  Video Visit    Video Start Time: 11:26 AM  Video End Time: 11:43 AM    Physician location: Off site    Platform used for Video Visit: Savannah Gaviria MD   Division of Diabetes, Endocrinology and Metabolism  Department of Medicine

## 2023-10-30 NOTE — PROGRESS NOTES
ENDOCRINOLOGY VIDEO FOLLOW-UP      HISTORY OF PRESENT ILLNESS    Denise Cazares is seen in follow-up via a billable video visit.     She has continued on levothyroxine 112 mcg daily.  However, it has been difficult to take levothyroxine consistently.  Caregiving for her daughter with medical needs along with the rest of her family consumes her time and she finds that she may sometimes forget taking levothyroxine.  She tried taking levothyroxine overnight but found that she may sometimes forget if she took levothyroxine overnight or not.    With less frequent use of levothyroxine, she has noted more fatigue.    Nexplanon remains in place and she has no menstrual cycles.  Her daughter is still breast-feeding, primarily for comfort.    No plans for conception in the future: Her  has an appointment in urology in 2023 to consider vasectomy.    Pertinent endocrine and related history:  1.  Hypothyroidism.  Diagnosed in  during pregnancy, attributed to Hashimoto's thyroiditis.  Has been on and off levothyroxine she since then, with interruption in medication due to insurance loss.  - (1 pregnancy ended with blighted ovum).  Has Nexplanon in place and is not having any menstrual bleeding on Nexplanon.  Does not plan to conceive in the future.    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  Youngest daughter has history of leukemia.  Lives in [place].      PAST MEDICAL HISTORY  Past Medical History:   Diagnosis Date     Anemia      Chickenpox      Endometrial polyp      Hypothyroidism        MEDICATIONS  Current Outpatient Medications   Medication Sig Dispense Refill     etonogestrel (NEXPLANON) 68 MG IMPL 1 each (68 mg) by Subdermal route once       hydroxychloroquine (PLAQUENIL) 200 MG tablet Take 1 tablet (200 mg) by mouth 2 times daily 180 tablet 0     levothyroxine (SYNTHROID/LEVOTHROID) 112 MCG tablet Take 1 tablet (112 mcg)  by mouth daily 30 tablet 3     Vitamin D3 (CHOLECALCIFEROL) 25 mcg (1000 units) tablet Take 2 tablets (50 mcg) by mouth daily         Allergies, family, and social history were reviewed and documented as needed in EHR.     REVIEW OF SYSTEMS  A focused ROS was performed, with pertinent positives and negatives as noted in the HPI.    PHYSICAL EXAM  There were no vitals taken for this visit.  There is no height or weight on file to calculate BMI.  Constitutional: Patient is alert, oriented and appears in no acute distress.  Eyes: Eyes grossly normal to inspection, EOMI, no stare, lid lag, or retraction; no conjunctival injection.  ENMT: Lips are without lesions.   Neck: No visible goiter or neck mass.  Respiratory: No audible wheeze or cough. No visible cyanosis. No visible increased work of breathing.  Neurological: Alert and oriented times 3.  Cranial nerves grossly intact.      DATA REVIEW  Each of the following laboratory and/or imaging studies were reviewed.    Component      Latest Ref Rng 10/24/2023  9:10 AM   TSH      0.30 - 4.20 uIU/mL 8.82 (H)    T4 Free      0.90 - 1.70 ng/dL 0.91       Legend:  (H) High    ASSESSMENT  1.  Hypothyroidism.  Biochemically mildly hypothyroid, has clinical symptoms suggestive of this also.  Difficulty with consistent use of levothyroxine due to other responsibilities.  We reviewed techniques that might be helpful in improving regularity of levothyroxine use.  We will reevaluate thyroid function tests in about 2 months after Ms. Stewart has been able to take levothyroxine consistently.    2.  Abnormal liver function tests.  Noted on screening rheumatology labs.  She has not taken Plaquenil for the past 2 to 3 months, does not take other medications which could potentially contribute to abnormal liver function tests.  Does not overuse Tylenol or alcohol.  Would recommend follow-up in primary care to further evaluate this abnormality.  Ms. Stewart wonders about the  possibility of nonalcoholic steatohepatitis: This is a potential contributor and we discussed lifestyle changes that can be helpful in managing this.  We will check CMP and A1c with next lab draw.    PLAN  -Continue levothyroxine 112 mcg daily; can try setting alarm on cell phone as a reminder of levothyroxine dose, can also try using a pillbox to set up medications for each day (it is fine to take a catch-up dose the next day if prior day's dose is missed)  -Labs in 2 months  -Return for a follow-up visit in 6 months (virtual visit is fine)  -We will communicate results via [employer], or if needed by phone      Video-Visit Details    Type of service:  Video Visit    Video Start Time: 11:26 AM  Video End Time: 11:43 AM    Physician location: Off site    Platform used for Video Visit: Savannah Gaviria MD   Division of Diabetes, Endocrinology and Metabolism  Department of Medicine

## 2023-10-30 NOTE — NURSING NOTE
Is the patient currently in the state of [place]? YES    Visit mode:VIDEO    If the visit is dropped, the patient can be reconnected by: VIDEO VISIT: Text to cell phone:   Telephone Information:   Mobile [phone]       Will anyone else be joining the visit? NO  (If patient encounters technical issues they should call [phone]    How would you like to obtain your AVS? MyChart    Are changes needed to the allergy or medication list? Yes Pt states she is not taking hydroxychloroquine.     Reason for visit: RECHECK    Phuong Fraser, MARTHA, VVF

## 2023-10-31 ENCOUNTER — OFFICE VISIT (OUTPATIENT)
Dept: FAMILY MEDICINE | Facility: CLINIC | Age: 33
End: 2023-10-31
Payer: COMMERCIAL

## 2023-10-31 VITALS
RESPIRATION RATE: 16 BRPM | SYSTOLIC BLOOD PRESSURE: 112 MMHG | BODY MASS INDEX: 41.43 KG/M2 | TEMPERATURE: 98.6 F | WEIGHT: 211 LBS | HEART RATE: 70 BPM | DIASTOLIC BLOOD PRESSURE: 72 MMHG | HEIGHT: 60 IN | OXYGEN SATURATION: 99 %

## 2023-10-31 DIAGNOSIS — Z00.00 ROUTINE GENERAL MEDICAL EXAMINATION AT A HEALTH CARE FACILITY: Primary | ICD-10-CM

## 2023-10-31 DIAGNOSIS — Z13.220 LIPID SCREENING: ICD-10-CM

## 2023-10-31 DIAGNOSIS — E55.9 VITAMIN D DEFICIENCY: ICD-10-CM

## 2023-10-31 DIAGNOSIS — E66.01 MORBID OBESITY (H): ICD-10-CM

## 2023-10-31 DIAGNOSIS — Z11.59 NEED FOR HEPATITIS C SCREENING TEST: ICD-10-CM

## 2023-10-31 DIAGNOSIS — R74.8 ELEVATED LIVER ENZYMES: ICD-10-CM

## 2023-10-31 LAB
ALBUMIN SERPL BCG-MCNC: 4.4 G/DL (ref 3.5–5.2)
ALP SERPL-CCNC: 104 U/L (ref 35–104)
ALT SERPL W P-5'-P-CCNC: 147 U/L (ref 0–50)
ANION GAP SERPL CALCULATED.3IONS-SCNC: 12 MMOL/L (ref 7–15)
AST SERPL W P-5'-P-CCNC: 64 U/L (ref 0–45)
BILIRUB SERPL-MCNC: 0.7 MG/DL
BUN SERPL-MCNC: 8.5 MG/DL (ref 6–20)
CALCIUM SERPL-MCNC: 9.1 MG/DL (ref 8.6–10)
CHLORIDE SERPL-SCNC: 105 MMOL/L (ref 98–107)
CHOLEST SERPL-MCNC: 157 MG/DL
CREAT SERPL-MCNC: 0.56 MG/DL (ref 0.51–0.95)
DEPRECATED HCO3 PLAS-SCNC: 23 MMOL/L (ref 22–29)
EGFRCR SERPLBLD CKD-EPI 2021: >90 ML/MIN/1.73M2
GLUCOSE SERPL-MCNC: 81 MG/DL (ref 70–99)
HDLC SERPL-MCNC: 44 MG/DL
LDLC SERPL CALC-MCNC: 94 MG/DL
NONHDLC SERPL-MCNC: 113 MG/DL
POTASSIUM SERPL-SCNC: 4.1 MMOL/L (ref 3.4–5.3)
PROT SERPL-MCNC: 7.9 G/DL (ref 6.4–8.3)
SODIUM SERPL-SCNC: 140 MMOL/L (ref 135–145)
TRIGL SERPL-MCNC: 93 MG/DL

## 2023-10-31 PROCEDURE — 80053 COMPREHEN METABOLIC PANEL: CPT | Performed by: NURSE PRACTITIONER

## 2023-10-31 PROCEDURE — 91320 SARSCV2 VAC 30MCG TRS-SUC IM: CPT | Performed by: NURSE PRACTITIONER

## 2023-10-31 PROCEDURE — 86803 HEPATITIS C AB TEST: CPT | Performed by: NURSE PRACTITIONER

## 2023-10-31 PROCEDURE — 80061 LIPID PANEL: CPT | Performed by: NURSE PRACTITIONER

## 2023-10-31 PROCEDURE — 82306 VITAMIN D 25 HYDROXY: CPT | Performed by: NURSE PRACTITIONER

## 2023-10-31 PROCEDURE — 90686 IIV4 VACC NO PRSV 0.5 ML IM: CPT | Performed by: NURSE PRACTITIONER

## 2023-10-31 PROCEDURE — 90471 IMMUNIZATION ADMIN: CPT | Performed by: NURSE PRACTITIONER

## 2023-10-31 PROCEDURE — 99395 PREV VISIT EST AGE 18-39: CPT | Mod: 25 | Performed by: NURSE PRACTITIONER

## 2023-10-31 PROCEDURE — 90480 ADMN SARSCOV2 VAC 1/ONLY CMP: CPT | Performed by: NURSE PRACTITIONER

## 2023-10-31 PROCEDURE — 99214 OFFICE O/P EST MOD 30 MIN: CPT | Mod: 25 | Performed by: NURSE PRACTITIONER

## 2023-10-31 PROCEDURE — 36415 COLL VENOUS BLD VENIPUNCTURE: CPT | Performed by: NURSE PRACTITIONER

## 2023-10-31 ASSESSMENT — ENCOUNTER SYMPTOMS
PALPITATIONS: 0
FREQUENCY: 0
DYSURIA: 0
SHORTNESS OF BREATH: 0
JOINT SWELLING: 0
SORE THROAT: 0
ARTHRALGIAS: 1
COUGH: 0
ABDOMINAL PAIN: 0
NERVOUS/ANXIOUS: 0
BREAST MASS: 0
HEMATOCHEZIA: 0
MYALGIAS: 0
DIARRHEA: 0
PARESTHESIAS: 0
NAUSEA: 0
HEARTBURN: 0
FEVER: 0
HEMATURIA: 0
DIZZINESS: 0
CONSTIPATION: 0
HEADACHES: 0
CHILLS: 0
EYE PAIN: 0
WEAKNESS: 0

## 2023-10-31 ASSESSMENT — PAIN SCALES - GENERAL: PAINLEVEL: NO PAIN (0)

## 2023-10-31 NOTE — PROGRESS NOTES
SUBJECTIVE:   CC: Denise is an 33 year old who presents for preventive health visit.       10/31/2023    11:41 AM   Additional Questions   Roomed by MARTHA Ambrose       Healthy Habits:     Getting at least 3 servings of Calcium per day:  Yes    Bi-annual eye exam:  Yes    Dental care twice a year:  NO    Sleep apnea or symptoms of sleep apnea:  Excessive snoring    Diet:  Regular (no restrictions)    Frequency of exercise:  None    Taking medications regularly:  No    Barriers to taking medications:  Problems remembering to take them    Medication side effects:  None    Additional concerns today:  Yes    Lab Results - AST and ALT elevated on 10/24/2023. Would like to discuss these results.                                                           Social History     Tobacco Use    Smoking status: Never    Smokeless tobacco: Never   Substance Use Topics    Alcohol use: Not Currently     Comment: occasionally-quit with pregnnacy         10/30/2023     9:56 PM   Alcohol Use   Prescreen: >3 drinks/day or >7 drinks/week? No     Reviewed orders with patient.  Reviewed health maintenance and updated orders accordingly - Yes  Labs reviewed in EPIC    Breast Cancer Screening:        10/30/2023     9:58 PM   Breast CA Risk Assessment (FHS-7)   Do you have a family history of breast, colon, or ovarian cancer? No / Unknown     Pertinent mammograms are reviewed under the imaging tab.    History of abnormal Pap smear: NO - age 30-65 PAP every 5 years with negative HPV co-testing recommended      Latest Ref Rng & Units 2/26/2021     2:56 PM 2/26/2021     2:15 PM   PAP / HPV   PAP (Historical)  NIL     HPV 16 DNA NEG^Negative  Negative    HPV 18 DNA NEG^Negative  Negative    Other HR HPV NEG^Negative  Negative      Reviewed and updated as needed this visit by clinical staff   Tobacco  Allergies  Meds  Problems  Med Hx  Surg Hx  Fam Hx          Reviewed and updated as needed this visit by Provider   Tobacco  Allergies  Meds   "Problems  Med Hx  Surg Hx  Fam Hx         Review of Systems   Constitutional:  Negative for chills and fever.   HENT:  Negative for congestion, ear pain, hearing loss and sore throat.    Eyes:  Negative for pain and visual disturbance.   Respiratory:  Negative for cough and shortness of breath.    Cardiovascular:  Negative for chest pain, palpitations and peripheral edema.   Gastrointestinal:  Negative for abdominal pain, constipation, diarrhea, heartburn, hematochezia and nausea.   Breasts:  Negative for tenderness, breast mass and discharge.   Genitourinary:  Negative for dysuria, frequency, genital sores, hematuria, pelvic pain, urgency, vaginal bleeding and vaginal discharge.   Musculoskeletal:  Positive for arthralgias. Negative for joint swelling and myalgias.   Skin:  Negative for rash.   Neurological:  Negative for dizziness, weakness, headaches and paresthesias.   Psychiatric/Behavioral:  Negative for mood changes. The patient is not nervous/anxious.      OBJECTIVE:   /72 (Cuff Size: Adult Large)   Pulse 70   Temp 98.6  F (37  C) (Tympanic)   Resp 16   Ht 1.518 m (4' 11.75\")   Wt 95.7 kg (211 lb)   LMP  (LMP Unknown)   SpO2 99%   Breastfeeding Yes   BMI 41.55 kg/m    Physical Exam  GENERAL: healthy, alert and no distress  EYES: Eyes grossly normal to inspection, PERRL and conjunctivae and sclerae normal  HENT: ear canals and TM's normal, nose and mouth without ulcers or lesions  NECK: no adenopathy, no asymmetry, masses, or scars and thyroid normal to palpation  RESP: lungs clear to auscultation - no rales, rhonchi or wheezes  CV: regular rate and rhythm, normal S1 S2, no S3 or S4, no murmur, click or rub, no peripheral edema and peripheral pulses strong  ABDOMEN: soft, nontender, no hepatosplenomegaly, no masses and bowel sounds normal  MS: no gross musculoskeletal defects noted, no edema  SKIN: no suspicious lesions or rashes  NEURO: Normal strength and tone, mentation intact and speech " "normal  PSYCH: mentation appears normal, affect normal/bright    Diagnostic Test Results:  No results found for any visits on 10/31/23.    ASSESSMENT/PLAN:   (Z00.00) Routine general medical examination at a health care facility  (primary encounter diagnosis)  Comment: concerns per below    (E55.9) Vitamin D deficiency  Comment: fatigue, working with endocrinology for thyroid. Plan recheck of vitamin d with history of deficiency   Plan: Vitamin D Deficiency          (Z11.59) Need for hepatitis C screening test  Comment:   Plan: Hepatitis C Screen Reflex to HCV RNA Quant and         Genotype          (Z13.220) Lipid screening  Comment: labs pending  Plan: Lipid panel reflex to direct LDL Fasting          (R74.8) Elevated liver enzymes  Comment: Repeat labs with liver enzymes again trending up, no medication changes and no alcohol or Tylenol use. Possibly related to fatty liver, lifestyle recommendation discussed and nutrition referral placed. Plan ultrasound to further evaluate.   Plan: Comprehensive metabolic panel (BMP + Alb, Alk         Phos, ALT, AST, Total. Bili, TP)          (E66.01) Morbid obesity (H)  Comment: per above. Discussed medications in addition to lifestyle changes however those are deferred at this time due to breastfeeding, Denise will return if she wishes to pursue this option when she is done breastfeeding.   Plan: Nutrition Referral            COUNSELING:  Reviewed preventive health counseling, as reflected in patient instructions      BMI:   Estimated body mass index is 41.55 kg/m  as calculated from the following:    Height as of this encounter: 1.518 m (4' 11.75\").    Weight as of this encounter: 95.7 kg (211 lb).   Weight management plan: Discussed healthy diet and exercise guidelines      She reports that she has never smoked. She has never used smokeless tobacco.        MIKE Vides CNP  [facility]  "

## 2023-11-01 LAB
HCV AB SERPL QL IA: NONREACTIVE
VIT D+METAB SERPL-MCNC: 13 NG/ML (ref 20–50)

## 2023-11-21 ENCOUNTER — HOSPITAL ENCOUNTER (OUTPATIENT)
Dept: ULTRASOUND IMAGING | Facility: CLINIC | Age: 33
Discharge: HOME | End: 2023-11-21
Attending: NURSE PRACTITIONER | Admitting: NURSE PRACTITIONER
Payer: COMMERCIAL

## 2023-11-21 DIAGNOSIS — R74.8 ELEVATED LIVER ENZYMES: ICD-10-CM

## 2023-11-21 PROCEDURE — 76705 ECHO EXAM OF ABDOMEN: CPT

## 2023-11-28 NOTE — PROGRESS NOTES
Rheumatology Clinic Visit  [facility]  RAOUL Kent     Denise Cazares MRN# [id number]   YOB: 1990 Age: 33 year old   Date of Visit: 12/01/2023  Primary care provider: [facility]          Assessment and Plan:     1.  Inflammatory arthritis  2.  Positive YOSELIN      Patient presents today for follow-up regarding her inflammatory arthritis.  She states that she try to increase the hydroxychloroquine to 2 tablets daily but did notice some diarrhea.  She then elected to stop the medication altogether and noted that she has been doing quite well.  Unfortunately with the cooler weather she had noticed some increased knee pain but overall feels that it is manageable.  Physical examination today did not show any active synovitis.  She has full fist formation.  No knee effusions.  Preceptor evaluations were reviewed, results below.      At this time there is no evidence of any inflammatory arthritis.  She is overall doing well being off of the medication.  Okay to continue to monitor.  If she does start to have increasing joint symptoms and goes back on hydroxychloroquine, she will let me know.  Otherwise she will follow-up with me in 4 months.      Plan:   Schedule follow-up with Bre Chanel PA-C in 4 months.   Labs: I do not need any labs if you are not on medications  Medication recommendations:   Okay to continue holding Hydroxychloroquine    RAOUL Kent  Rheumatology         History of Present Illness:   Denise Cazares presents for evaluation of polyarthralgia.      Interval history December 1, 2023:  She has been off the Hydroxychloroquine for about 3 months. She feels that she has been doing well enough to not need it. She tried taking 2/day but had diarrhea. She has noticed with the colder weather, increased knee pain. She has some aching in her hands with washing dishes with cold water. She has not had any swelling. She talked with her PCP regarding  "her liver function tests. US showed fatty liver. They discussed lifestyle changes. She has started seeing therapy to help work through everything with her daughter.       Interval history May 30, 2023:  She still has some pain in the left knee. At times she has a hard time walking, but overall she feels it is bearable. She does feel the Hydroxychloroquine is helping. She did have a week where she did not take it and had more pain. No skin rashes or GI upset. She did have some GI upset at the beginning, but she feels this has leveled off.     Interval history November 18, 2022:    She states that she spoke with her orthopedic provider and it did not think that anything needed to be done surgically.     She states that she has a hard time going up and down the stairs. She feels that her knee \"alexandru\". She woke up this morning with discomfort in her left knee. No skin rashes. No dry eyes or dry mouth. She has had some post-partum hair loss.     She states that her wrists have been okay. When she lays down and then goes to get up, she has pain in her heel, so she does limp until it resolves. She reports her  saying that she walks \"weird\". No Raynaud's. No shortness of breath, chest pain or pleurisy. No recent infections. No fevers.    HPI from Consult 07/25/2022:  She states that in April, she started to have pain in her left sciatic nerve. She then started to have pain on the top of her left foot by the ankle. She has a daughter with leukemia and was at the hospital. She then started to have her left knee swell. She had previously had swelling in ehr right knee with fluid removed. She got the liquid removed on the left. 1 week later, she needed it removed again. She then started to have pain in her wrists. She had a positive lyme and was given amoxicillin. She saw infectious disease but was told they were uncertain if she had lyme. 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 She had further blood showing " "antibodies for lyme. She feels that both of her knees are swollen and the bottom of her heel. She has pain when she first stands up on the heel and that resolves after walking a few steps. She states that it is hard to bend her knees in the morning. She also has hypermobile joints as well and there is question on if this is causing \"wear and tear\". She has seen orthopedics and PT was recommended. She is able to walk better since completing the antibiotic, but she continues to feel that there is a lot of swelling in her joints. She feels that she is worse in the mornings after periods of rest. No skin rashes. She has some fatigue, but she feels this is related to all the stress with her daughter.     Her mother complains about pain in her hands but is unsure if there is RA. No family history of lupus. No personal or family history of psoriasis, ulcerative colitis or crohn's.          Review of Systems:     Constitutional: negative  Skin: negative  Eyes: negative  Ears/Nose/Throat: negative  Respiratory: No shortness of breath, dyspnea on exertion, cough, or hemoptysis  Cardiovascular: negative  Gastrointestinal: negative  Genitourinary: negative  Musculoskeletal: as above  Neurologic: negative  Psychiatric: negative  Hematologic/Lymphatic/Immunologic: negative  Endocrine: negative         Active Problem List:     Patient Active Problem List    Diagnosis Date Noted    Nexplanon in place 12/14/2021 LT Arm 12/14/2021     Priority: Medium     Nexplanon placed 12/14/2021  LOT# [id number]  Exp: 04/09/2024  NDC# 40223-488-67    Dinora Morrison on 12/14/2021 at 9:53 AM          Class 2 obesity in adult 10/12/2021     Priority: Medium    Right knee pain 02/26/2021     Priority: Medium    Hypothyroidism      Priority: Medium            Past Medical History:     Past Medical History:   Diagnosis Date    Anemia     Chickenpox     Endometrial polyp     Hypothyroidism      Past Surgical History:   Procedure Laterality Date "    AS HYSTEROSCOPY W ENDOMETRIAL BX/POLYPECTOMY W/WO D&C      LAPAROSCOPIC CHOLECYSTECTOMY      TONSILLECTOMY              Social History:     Social History     Socioeconomic History    Marital status:      Spouse name: Not on file    Number of children: Not on file    Years of education: Not on file    Highest education level: Not on file   Occupational History    Not on file   Tobacco Use    Smoking status: Never    Smokeless tobacco: Never   Vaping Use    Vaping Use: Never used   Substance and Sexual Activity    Alcohol use: Not Currently     Comment: occasionally-quit with pregnnacy    Drug use: Never    Sexual activity: Yes     Partners: Male     Birth control/protection: None     Comment: Dominick   Other Topics Concern    Not on file   Social History Narrative    Not on file     Social Determinants of Health     Financial Resource Strain: Low Risk  (10/30/2023)    Financial Resource Strain     Within the past 12 months, have you or your family members you live with been unable to get utilities (heat, electricity) when it was really needed?: No   Food Insecurity: Low Risk  (10/30/2023)    Food Insecurity     Within the past 12 months, did you worry that your food would run out before you got money to buy more?: No     Within the past 12 months, did the food you bought just not last and you didn t have money to get more?: No   Transportation Needs: Low Risk  (10/30/2023)    Transportation Needs     Within the past 12 months, has lack of transportation kept you from medical appointments, getting your medicines, non-medical meetings or appointments, work, or from getting things that you need?: No   Physical Activity: Not on file   Stress: Not on file   Social Connections: Not on file   Interpersonal Safety: Low Risk  (10/31/2023)    Interpersonal Safety     Do you feel physically and emotionally safe where you currently live?: Yes     Within the past 12 months, have you been hit, slapped, kicked or otherwise  physically hurt by someone?: No     Within the past 12 months, have you been humiliated or emotionally abused in other ways by your partner or ex-partner?: No   Housing Stability: Low Risk  (10/30/2023)    Housing Stability     Do you have housing? : Patient refused     Are you worried about losing your housing?: No          Family History:     Family History   Problem Relation Age of Onset    Diabetes Mother     Hypertension Mother     No Known Problems Father     Other - See Comments Maternal Grandfather         MVA            Allergies:   No Known Allergies         Medications:     Current Outpatient Medications   Medication Sig Dispense Refill    etonogestrel (NEXPLANON) 68 MG IMPL 1 each (68 mg) by Subdermal route once      levothyroxine (SYNTHROID/LEVOTHROID) 112 MCG tablet Take 1 tablet (112 mcg) by mouth daily 30 tablet 3    Vitamin D3 (CHOLECALCIFEROL) 25 mcg (1000 units) tablet Take 2 tablets (50 mcg) by mouth daily (Patient not taking: Reported on 12/1/2023)              Physical Exam:   Blood pressure 112/77, pulse 75, temperature 98  F (36.7  C), temperature source Tympanic, resp. rate 16, weight 97.5 kg (215 lb), SpO2 98%, currently breastfeeding.  Wt Readings from Last 6 Encounters:   12/01/23 97.5 kg (215 lb)   10/31/23 95.7 kg (211 lb)   08/21/23 94.9 kg (209 lb 3.2 oz)   05/30/23 92 kg (202 lb 12.8 oz)   08/11/22 80.7 kg (178 lb)   07/25/22 77.6 kg (171 lb)     Constitutional: well-developed, appearing stated age; cooperative  Eyes: nl  conjunctiva, sclera  ENT: nl external ears, nose, hearing, lips,   Resp: No shortness of breath with normal conversation  MSK: No active synovitis or dactylitis.  Full fist formation.  No knee effusions.  Psych: nl judgement, orientation, memory, affect.           Data:   Imaging:  MRI left knee 5/31/2022  Impression:   1. Nodular intra-articular foci as detailed above. Differential would   include infectious or inflammatory etiology. Nodular synovitis and/or   PVNS  are also included in the differential. Consider referral to   orthopedic oncology at the [facility].     2. Repeat MRI with gadolinium and susceptibility weighted imaging   could be considered in 3-6 months.     Xray left foot 5/3/2022  IMPRESSION: No fractures are evident. Normal joint spacing and  alignment. The soft tissues are unremarkable.     MRI right knee 8/14/22  Impression: Large knee joint effusion with area of mild synovitis.  Given contralateral knee with possible synovitis finding, this may be  systemic condition. Especially with positive lyme antibodies,  differential consideration include lyme arthritis. No evidence of  erosion, reactive osteitis.       Ultrasound of her abdomen on 11/21/2023 shows hepatic steatosis and prior cholecystectomy  Laboratory:  5/11/2022  Uric acid 2.1  Rheumatoid factor 7  YOSELIN negative    5/18/2022  CCP antibody 2.6  CBC within normal parameters    6/24/2022  TSH 3.9, T4 0.76    7/15/2022  Creatinine 0.56, GFR greater than 80  ALT 32, AST 20  CRP 6.3  Vitamin D 18  Sed rate 22  LYme confirm IgG reactive, 9.0      Fluid analysis: 5/16/2022: 6K WBC, 38% PMNs, 36% lymphocytes. Cultures on broth showed likely contaminate of diptheroids, globicatella sanuinis. Lyme analysis showed reactive IgG    8/11/2022   CRP less than 2.9  Rheumatoid factor 8  Sed rate 15  YOSELIN borderline positive with a speckled pattern and a titer of 1: 80    12/1/2022  Creatinine 0.49, GFR greater than 90  Albumin 3.9  ALT 92, AST 30  Syndrome antibody negative  CRP 3.7  CBC within normal limits  Sed rate 10  C3 141, C4 30  Double-stranded DNA negative  RNP equivocal  SCL 70 negative  Smith antibody negative  SSA and SSB negative    1/19/2023  Creatinine 0.53, GFR greater than 90  Albumin 4.4  ALT 40, AST 24  CRP less than 3.0  TSH 0.55  CBC within normal limits  Sed rate 11    5/30/2023  Creatinine 0.55, GFR greater than 90  Albumin 4.5  ALT 31, AST 18  CBC within normal limits  TSH  4.40    10/24/2023  Creatinine 0.59, GFR greater than 90  Albumin 4.2  , AST 57  White blood cell count 7.3, hemoglobin 12.1, platelet count 208  RNP antibody negative    10/31/2023  Creatinine 0.56, GFR greater than 90  , AST 64  Hepatitis C nonreactive

## 2023-12-01 ENCOUNTER — OFFICE VISIT (OUTPATIENT)
Dept: RHEUMATOLOGY | Facility: CLINIC | Age: 33
End: 2023-12-01
Payer: COMMERCIAL

## 2023-12-01 VITALS
SYSTOLIC BLOOD PRESSURE: 112 MMHG | HEART RATE: 75 BPM | WEIGHT: 215 LBS | BODY MASS INDEX: 42.34 KG/M2 | RESPIRATION RATE: 16 BRPM | DIASTOLIC BLOOD PRESSURE: 77 MMHG | OXYGEN SATURATION: 98 % | TEMPERATURE: 98 F

## 2023-12-01 DIAGNOSIS — M19.90 INFLAMMATORY ARTHRITIS: Primary | ICD-10-CM

## 2023-12-01 DIAGNOSIS — R76.8 POSITIVE ANA (ANTINUCLEAR ANTIBODY): ICD-10-CM

## 2023-12-01 PROCEDURE — 99213 OFFICE O/P EST LOW 20 MIN: CPT | Performed by: PHYSICIAN ASSISTANT

## 2023-12-01 NOTE — PATIENT INSTRUCTIONS
After Visit Instructions:     Thank you for coming to [facility] for your care. It is my goal to partner with you to help you reach your optimal state of health.       Plan:     Schedule follow-up with Bre Chanel PA-C in 4 months.   Labs: I do not need any labs if you are not on medications  Medication recommendations:   Okay to continue holding Hydroxychloroquine        Bre Chanel PA-C  [facility]  [facility]    Contact information: [facility]  Clinic Number:  [phone]  Please call or send a [employer] message with any questions about your care

## 2023-12-01 NOTE — NURSING NOTE
Chief Complaint   Patient presents with    RECHECK     6 month recheck- inflammatory arthritis       Vitals:    12/01/23 0814   Pulse: 75   Resp: 16   SpO2: 98%   Weight: 97.5 kg (215 lb)     Wt Readings from Last 1 Encounters:   12/01/23 97.5 kg (215 lb)       Audrey Owens MA

## 2023-12-12 ENCOUNTER — ALLIED HEALTH/NURSE VISIT (OUTPATIENT)
Dept: EDUCATION SERVICES | Facility: CLINIC | Age: 33
End: 2023-12-12
Payer: COMMERCIAL

## 2023-12-12 DIAGNOSIS — E66.01 MORBID OBESITY (H): ICD-10-CM

## 2023-12-12 PROCEDURE — 97802 MEDICAL NUTRITION INDIV IN: CPT | Performed by: DIETITIAN, REGISTERED

## 2023-12-12 NOTE — PROGRESS NOTES
"MEDICAL NUTRITION THERAPY  Visit Type:Initial assessment and intervention  In person visit   SUBJECTIVE:   Denise Cazares presents today for MNT and education related to weight management.   She is accompanied by self.     EATING HABITS:   Eating is social, enjoys it more with others than alone   Breakfast: coffee, cereral  honey bunches of oats  / ham, egg, cheese english muffin   Lunch: leftovers   Dinner: cooking dinner   Snacks: not very often, sometimes with    Beverages: water, coffee with sugar and oat milk, watered down juice     Food notes / changes   Started with small changes over the past few months   Denise has many [place] foods and often rice, her  has more of a [place] influence with more beans   Has been watching refined carbohydrates closer and now assisting her mom with this as well who recently moved in and has Diabetes   Switched to lower sodium canned beans   Granulatd white sugar changed to cane sugar  - measure portion in coffee and attempt to reduce   Increasing veggies, whole grains  moved to air fryer, portions  watching sweets  trying not to eat again when  eats later dinner    EXERCISE: sporadic or irregular exercise - got her sisters treadmill and has potential for this.  Trying to encourage the family on walks     SOCIO/ECONOMIC:   Lives with: mother, spouse, and children    OBJECTIVE:   No results found for: \"A1C\"  Cholesterol   Date Value Ref Range Status   10/31/2023 157 <200 mg/dL Final     Triglycerides   Date Value Ref Range Status   10/31/2023 93 <150 mg/dL Final     Direct Measure HDL   Date Value Ref Range Status   10/31/2023 44 (L) >=50 mg/dL Final   Vitamin D Deficiency Screening Results:  Lab Results   Component Value Date    VITDT 13 (L) 10/31/2023    VITDT 18 (L) 07/15/2022   Get vitamin D supplement when financially able - hasn't gotten yet   Wt Readings from Last 5 Encounters:   12/01/23 97.5 kg (215 lb)   10/31/23 95.7 kg (211 lb) " "  08/21/23 94.9 kg (209 lb 3.2 oz)   05/30/23 92 kg (202 lb 12.8 oz)   08/11/22 80.7 kg (178 lb)     Estimated body mass index is 42.34 kg/m  as calculated from the following:    Height as of 10/31/23: 1.518 m (4' 11.75\").    Weight as of 12/1/23: 97.5 kg (215 lb).      ASSESSMENT:   Reviewed history over the last 2 years with her daughters cancer diagnosis.  This has been very stressful with multiple times at the hospital, treatments, stress, poor sleep etc. She was so focused on her daughter an knew it was impacting her own health and had subsequent weight gain.  Denise and her family have worked very hard to get everything back on track from acquiring better and stable health  beginning therapy, getting routine health care etc.   Sleep is still a challenge due to her daughters tube feedings, but this is temporary and things are improving.       VERBAL AND WRITTEN INFORMATION GIVEN TO SUPPORT:  Discussed: general nutrition guidelines, weight reduction, labeling, fat modification, exercise, dining out/special occasions, fiber, behavior modification, portion control, and heart healthy diet.  PLAN:   PATIENT'S BEHAVIOR CHANGE GOALS:   See Patient Instructions for patient stated behavior change goals. AVS was printed and given to patient at today's appointment.    FOLLOW UP:   Follow up with RD as needed.  Call RD with questions/concerns.     Drea Bernstein RD, LD, [facility]  Diabetes Education    Time spent in minutes: 45  Encounter: Individual    "

## 2023-12-12 NOTE — Clinical Note
"    12/12/2023         RE: Denise Cazares  [address]  [place] [address]        Dear Colleague,    Thank you for referring your patient, Denise Cazares, to the [facility] [place]. Please see a copy of my visit note below.    MEDICAL NUTRITION THERAPY  Visit Type:{:821693}    SUBJECTIVE:   Denise Cazares presents today for MNT and education related to {:871830}.   She is accompanied by {:662007}.   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   Eating is social   Mom moved in 2 weeks ago - has Diabetes   Got her sisters treadmill  Hard times with the littelst one on TF & post leukemia - doesn't sleep well, weaning from tf   Increasing veggies, whole grains, moved to air fryer, portions, watching sweets, trying not to eat again when  eats later dinner   [employer] websites on fiber & rice/bean reicpes   Trying to use many fewer processed foods   Get vitamin D supplement when financially able   PATIENT STATED GOAL(S) FOR THIS VISIT: ***    EATING HABITS:   Breakfast: coffee, cereral  honey bunches of oats  / ham, egg, cheese english muffin   Lunch: leftovers   Dinner: cooking dinner   Snacks: ***  Beverages: {:163412}    Misses meals? ***  Eats out:  {:171722}     Previous diet education:  {YES/NO :949260::\"Yes\"}     Food allergies/intolerances: ***    EXERCISE: {EXERCISE PATTERN:474931}    SOCIO/ECONOMIC:   Lives with: {:130615}    OBJECTIVE:   Vitals: LMP  (LMP Unknown)     Weight Change: ***    ASSESSMENT:   ***  Diet is high in: {:807198}  Diet is low in: {:932551}    Estimated Energy Expenditure: *** kcal  Recommended Energy Intake: *** kcal  VERBAL AND WRITTEN INFORMATION GIVEN TO SUPPORT:  Discussed: {DIET EDUCATION:571255}.  Education Materials Provided: " "{:616308}    PLAN:   PATIENT'S BEHAVIOR CHANGE GOALS:   See Patient Instructions for patient stated behavior change goals. AVS was printed and given to patient at today's appointment.    FOLLOW UP:   {:253025}    ***  Time spent in minutes: ***  Encounter: Individual      No results found for: \"A1C\"      MEDICAL NUTRITION THERAPY  Visit Type:Initial assessment and intervention  In person visit   SUBJECTIVE:   Denise Cazares presents today for MNT and education related to weight management.   She is accompanied by self.   PATIENT STATED GOAL(S) FOR THIS VISIT: ***  EATING HABITS:   Eating is social, enjoys it more with others than alone   Breakfast: coffee, cereral  honey bunches of oats  / ham, egg, cheese english muffin   Lunch: leftovers   Dinner: cooking dinner   Snacks: not very often, sometimes with    Beverages: water, coffee with sugar and oat milk, watered down juice     Food notes / changes   Started with small changes over the past few months   Denise has many [place] foods and often rice, her  has more of a [place] influence with more beans   Has been watching refined carbohydrates closer and now assisting her mom with this as well who recently moved in and has Diabetes   Switched to lower sodium canned beans   Granulatd white sugar changed to cane sugar  - measure portion in coffee and attempt to reduce   Increasing veggies, whole grains  moved to air fryer, portions  watching sweets  trying not to eat again when  eats later dinner    EXERCISE: sporadic or irregular exercise - got her sisters treadmill and has potential for this.  Trying to encourage the family on walks     SOCIO/ECONOMIC:   Lives with: mother, spouse, and children    OBJECTIVE:   No results found for: \"A1C\"  Cholesterol   Date Value Ref Range Status   10/31/2023 157 <200 mg/dL Final     Triglycerides   Date Value Ref Range Status   10/31/2023 93 <150 mg/dL Final     Direct Measure HDL   Date Value " "Ref Range Status   10/31/2023 44 (L) >=50 mg/dL Final   Vitamin D Deficiency Screening Results:  Lab Results   Component Value Date    VITDT 13 (L) 10/31/2023    VITDT 18 (L) 07/15/2022   Get vitamin D supplement when financially able - hasn't gotten yet   Wt Readings from Last 5 Encounters:   12/01/23 97.5 kg (215 lb)   10/31/23 95.7 kg (211 lb)   08/21/23 94.9 kg (209 lb 3.2 oz)   05/30/23 92 kg (202 lb 12.8 oz)   08/11/22 80.7 kg (178 lb)     Estimated body mass index is 42.34 kg/m  as calculated from the following:    Height as of 10/31/23: 1.518 m (4' 11.75\").    Weight as of 12/1/23: 97.5 kg (215 lb).      ASSESSMENT:   Reviewed history over the last 2 years with her daughters cancer diagnosis.  This has been very stressful with multiple times at the hospital, treatments, stress, poor sleep etc. She was so focused on her daughter an knew it was impacting her own health and had subsequent weight gain.  Denise and her family have worked very hard to get everything back on track from acquiring better and stable health  beginning therapy, getting routine health care etc.   Sleep is still a challenge due to her daughters tube feedings, but this is temporary and things are improving.       VERBAL AND WRITTEN INFORMATION GIVEN TO SUPPORT:  Discussed: general nutrition guidelines, weight reduction, labeling, fat modification, exercise, dining out/special occasions, fiber, behavior modification, portion control, and heart healthy diet.  PLAN:   PATIENT'S BEHAVIOR CHANGE GOALS:   See Patient Instructions for patient stated behavior change goals. AVS was printed and given to patient at today's appointment.    FOLLOW UP:   Follow up with RD as needed.  Call RD with questions/concerns.     Drea Bernstein RD, LD, [facility]  Diabetes Education    Time spent in minutes: 45  Encounter: Individual        "

## 2023-12-26 ENCOUNTER — LAB (OUTPATIENT)
Dept: LAB | Facility: CLINIC | Age: 33
End: 2023-12-26
Payer: COMMERCIAL

## 2023-12-26 DIAGNOSIS — E55.9 VITAMIN D DEFICIENCY: ICD-10-CM

## 2023-12-26 DIAGNOSIS — E03.9 HYPOTHYROIDISM, UNSPECIFIED TYPE: ICD-10-CM

## 2023-12-26 LAB
ALBUMIN SERPL BCG-MCNC: 4.1 G/DL (ref 3.5–5.2)
ALP SERPL-CCNC: 106 U/L (ref 40–150)
ALT SERPL W P-5'-P-CCNC: 134 U/L (ref 0–50)
ANION GAP SERPL CALCULATED.3IONS-SCNC: 8 MMOL/L (ref 7–15)
AST SERPL W P-5'-P-CCNC: 60 U/L (ref 0–45)
BILIRUB SERPL-MCNC: 0.5 MG/DL
BUN SERPL-MCNC: 12.2 MG/DL (ref 6–20)
CALCIUM SERPL-MCNC: 8.8 MG/DL (ref 8.6–10)
CHLORIDE SERPL-SCNC: 110 MMOL/L (ref 98–107)
CREAT SERPL-MCNC: 0.42 MG/DL (ref 0.51–0.95)
DEPRECATED HCO3 PLAS-SCNC: 23 MMOL/L (ref 22–29)
EGFRCR SERPLBLD CKD-EPI 2021: >90 ML/MIN/1.73M2
GLUCOSE SERPL-MCNC: 106 MG/DL (ref 70–99)
HBA1C MFR BLD: 5.7 % (ref 0–5.6)
POTASSIUM SERPL-SCNC: 4.3 MMOL/L (ref 3.4–5.3)
PROT SERPL-MCNC: 7.4 G/DL (ref 6.4–8.3)
SODIUM SERPL-SCNC: 141 MMOL/L (ref 135–145)
T4 FREE SERPL-MCNC: 1.28 NG/DL (ref 0.9–1.7)
TSH SERPL DL<=0.005 MIU/L-ACNC: 0.21 UIU/ML (ref 0.3–4.2)
VIT D+METAB SERPL-MCNC: 14 NG/ML (ref 20–50)

## 2023-12-26 PROCEDURE — 80053 COMPREHEN METABOLIC PANEL: CPT

## 2023-12-26 PROCEDURE — 84439 ASSAY OF FREE THYROXINE: CPT

## 2023-12-26 PROCEDURE — 83036 HEMOGLOBIN GLYCOSYLATED A1C: CPT

## 2023-12-26 PROCEDURE — 84443 ASSAY THYROID STIM HORMONE: CPT

## 2023-12-26 PROCEDURE — 36415 COLL VENOUS BLD VENIPUNCTURE: CPT

## 2023-12-26 PROCEDURE — 82306 VITAMIN D 25 HYDROXY: CPT

## 2024-01-04 DIAGNOSIS — E03.9 HYPOTHYROIDISM, UNSPECIFIED TYPE: Primary | ICD-10-CM

## 2024-01-04 RX ORDER — LEVOTHYROXINE SODIUM 100 UG/1
100 TABLET ORAL DAILY
Qty: 30 TABLET | Refills: 3 | Status: SHIPPED | OUTPATIENT
Start: 2024-01-04 | End: 2024-06-04

## 2024-01-18 ENCOUNTER — OFFICE VISIT (OUTPATIENT)
Dept: FAMILY MEDICINE | Facility: CLINIC | Age: 34
End: 2024-01-18
Payer: COMMERCIAL

## 2024-01-18 VITALS
SYSTOLIC BLOOD PRESSURE: 118 MMHG | WEIGHT: 211 LBS | HEIGHT: 60 IN | TEMPERATURE: 98.4 F | DIASTOLIC BLOOD PRESSURE: 68 MMHG | HEART RATE: 69 BPM | RESPIRATION RATE: 16 BRPM | OXYGEN SATURATION: 99 % | BODY MASS INDEX: 41.43 KG/M2

## 2024-01-18 DIAGNOSIS — R20.2 PARESTHESIAS: Primary | ICD-10-CM

## 2024-01-18 DIAGNOSIS — L81.9 CHANGING PIGMENTED SKIN LESION: ICD-10-CM

## 2024-01-18 PROCEDURE — 99214 OFFICE O/P EST MOD 30 MIN: CPT | Performed by: NURSE PRACTITIONER

## 2024-01-18 ASSESSMENT — PAIN SCALES - GENERAL: PAINLEVEL: NO PAIN (0)

## 2024-01-18 ASSESSMENT — ENCOUNTER SYMPTOMS: NUMBNESS: 1

## 2024-01-18 NOTE — COMMUNITY RESOURCES LIST (ENGLISH)
01/18/2024   [facility]  N/A  For questions about this resource list or additional care needs, please contact your primary care clinic or care manager.  Phone: [phone]   Email: N/A   Address: [address] [place] [address]   Hours: N/A        Food and Nutrition       Food pantry  1  [employer] [facility] Distance: 0.63 miles      Pickup   [address] [place] [address]  Language: English  Hours: Mon 9:00 AM - 5:00 PM , Wed 9:00 AM - 5:00 PM , Fri 9:00 AM - 5:00 PM  Fees: Free   Phone: (797) 999-1715 Email: mail@[employer] Website: https://www.[employer]/our-work/food-access-and-equity/     2  [facility] Distance: 1.13 miles      Delivery, Pick   [address] [place] [address]  Language: English  Hours: Mon - Thu 8:00 AM - 3:00 PM  Fees: Free   Phone: (413) 936-8887 Website: http://www.[facility].org/     SNAP application assistance  3  [facility] [place] Office Distance: 0.62 miles      In-Person, Phone/Virtual   [address] [place] [address]  Language: English  Hours: Mon - Fri 8:00 AM - 4:30 PM  Fees: Free   Phone: (371) 988-9392 Email: tabby@[facility].[employer]Drum Website: https://www.[facility].org/agency-information     4  [facility] [place] Family Investment Program (MFIP) - [place] [employer] Program (MFIP) - SNAP application assistance Distance: 11.37 miles      In-Person, Phone/Virtual   [address] [address]  Language: English  Hours: Mon - Fri 8:00 AM - 4:30 PM  Fees: Free   Phone: (174) 882-3888 Email: rubens@[facility].gov Website: https://www.[facility]./499/MFIP-Biennial-Service-Agreement-VCA-Plan          Housing       Shelter for individuals  5  [employer] [place] ([facility]) - [place] Office -  Emergency Housing Assistance - Emergency housing Distance: 0.62 miles      In-Person   [address] [place] [address]  Language: English  Hours: Mon - Fri 8:00 AM - 4:30 PM  Fees: Free   Phone: (633) 807-9967 Ext.4 Email: tabby@[employer] Website: https://www.[employer]/agency-information     6  [employer] Distance: 21.18 miles      In-Person   [address] [place] [address]  Language: English  Hours: Mon - Sun Open 24 Hours  Fees: Sliding Fee   Phone: (872) 627-7861 Email: info@[employer] Website: http://www.[employer].org          Important Numbers & Websites       Emergency Services   911  City Services   311  Poison Control   (427) 870-2232  Suicide Prevention Lifeline   (671) 638-4025 (TALK)  Child Abuse Hotline   (844) 778-5166 (4-A-Child)  Sexual Assault Hotline   (367) 808-9761 (HOPE)  National Runaway Safeline   (953) 933-5302 (RUNAWAY)  All-Options Talkline   (104) 226-3500  Substance Abuse Referral   (951) 702-1797 (HELP)

## 2024-01-18 NOTE — PATIENT INSTRUCTIONS
[facility] will call you to coordinate your care as prescribed by your provider. If you don't hear from a representative within 2 business days, please call [phone].

## 2024-01-18 NOTE — PROGRESS NOTES
"  Assessment & Plan     Paresthesias  Negative Phalen's however symptoms consistent with carpal tunnel.  Plan to start bracing and EMG for further evaluation.   - EMG; Future    Changing pigmented skin lesion  Benign appearing lesion, plan to monitor at this time. Symptoms which would warrant follow up discussed.       Shirley Walker is a 33 year old, presenting for the following health issues:  Numbness        1/18/2024     9:13 AM   Additional Questions   Roomed by MARTHA Ambrose     History of Present Illness       Reason for visit:  Numbing fingers  Symptom onset:  3-4 weeks ago  Symptoms include:  Fingertips feel numb  Symptom intensity:  Mild  Symptom progression:  Staying the same  Had these symptoms before:  Yes  Has tried/received treatment for these symptoms:  Yes    She eats 0-1 servings of fruits and vegetables daily.She consumes 2 sweetened beverage(s) daily.She exercises with enough effort to increase her heart rate 30 to 60 minutes per day.  She exercises with enough effort to increase her heart rate 5 days per week.   She is taking medications regularly.   R > L  - usually worse when bent at wrist     Nipple - 1-2 years Derm problem , no pain, pt explains as lesion like.         Objective    /68 (Cuff Size: Adult Large)   Pulse 69   Temp 98.4  F (36.9  C) (Tympanic)   Resp 16   Ht 1.518 m (4' 11.75\")   Wt 95.7 kg (211 lb)   SpO2 99%   Breastfeeding Yes   BMI 41.55 kg/m    Body mass index is 41.55 kg/m .    Review of Systems  Constitutional, HEENT, cardiovascular, pulmonary, gi and gu systems are negative, except as otherwise noted.  Physical Exam   GENERAL: alert and no distress  MS: extremities normal- no gross deformities noted, negative Phalen's   SKIN: no suspicious lesions or rashes and stuck on brown papule present right areola   NEURO: Normal strength and tone, mentation intact and speech normal  PSYCH: mentation appears normal, affect normal/bright    No results found for any " visits on 01/18/24.        Signed Electronically by: MIKE Vides CNP

## 2024-03-25 ENCOUNTER — OFFICE VISIT (OUTPATIENT)
Dept: FAMILY MEDICINE | Facility: CLINIC | Age: 34
End: 2024-03-25
Payer: COMMERCIAL

## 2024-03-25 VITALS
HEART RATE: 98 BPM | SYSTOLIC BLOOD PRESSURE: 110 MMHG | HEIGHT: 60 IN | RESPIRATION RATE: 16 BRPM | OXYGEN SATURATION: 97 % | TEMPERATURE: 97 F | WEIGHT: 217 LBS | BODY MASS INDEX: 42.6 KG/M2 | DIASTOLIC BLOOD PRESSURE: 70 MMHG

## 2024-03-25 DIAGNOSIS — J02.9 SORE THROAT: Primary | ICD-10-CM

## 2024-03-25 LAB
DEPRECATED S PYO AG THROAT QL EIA: NEGATIVE
FLUAV AG SPEC QL IA: NEGATIVE
FLUBV AG SPEC QL IA: NEGATIVE
GROUP A STREP BY PCR: NOT DETECTED
SARS-COV-2 RNA RESP QL NAA+PROBE: NEGATIVE

## 2024-03-25 PROCEDURE — 87635 SARS-COV-2 COVID-19 AMP PRB: CPT | Performed by: FAMILY MEDICINE

## 2024-03-25 PROCEDURE — 99213 OFFICE O/P EST LOW 20 MIN: CPT | Performed by: FAMILY MEDICINE

## 2024-03-25 PROCEDURE — 87651 STREP A DNA AMP PROBE: CPT | Performed by: FAMILY MEDICINE

## 2024-03-25 PROCEDURE — 87804 INFLUENZA ASSAY W/OPTIC: CPT | Performed by: FAMILY MEDICINE

## 2024-03-25 ASSESSMENT — PAIN SCALES - GENERAL: PAINLEVEL: MODERATE PAIN (5)

## 2024-03-25 NOTE — PROGRESS NOTES
Assessment & Plan     Sore throat  Orders as below. Rapid strep and flu negative. Continue with supportive cares including alternating ibuprofen and tylenol, cough drops. Follow up results.  - Streptococcus A Rapid Screen w/Reflex to PCR - Clinic Collect  - Group A Streptococcus PCR Throat Swab  - Symptomatic COVID-19 Virus (Coronavirus) by PCR Nose  - Influenza A/B antigen                  Subjective   Denise is a 34 year old, presenting for the following health issues:  Throat Problem        3/25/2024     8:21 AM   Additional Questions   Roomed by Rachel CAPONE   Accompanied by self     History of Present Illness       Reason for visit:  Sore throat  Symptom onset:  1-3 days ago    She eats 2-3 servings of fruits and vegetables daily.She consumes 2 sweetened beverage(s) daily.She exercises with enough effort to increase her heart rate 20 to 29 minutes per day.  She exercises with enough effort to increase her heart rate 3 or less days per week. She is missing 1 dose(s) of medications per week.       Pre-Provider Visit Orders - Rapid Strep  Does the patient have shortness of breath/trouble breathing, an earache, drooling/too much saliva, or any difficulty opening her mouth/moving neck?  No  Does the patient have a sore throat and either history of fever >100.4 in the previous 24 hours without a cough or recent exposure to a known case of strep throat? Yes   Acute Illness  Acute illness concerns: Throat  Onset/Duration: 3 days worse since yesterday  Symptoms:  Fever: No  Chills/Sweats: No  Headache (location?): YES Saturday but now gone  Sinus Pressure: No  Conjunctivitis:  No  Ear Pain: no  Rhinorrhea: No  Congestion: YES  Sore Throat: YES  Cough: no  Wheeze: No  Decreased Appetite: YES  Nausea: No  Vomiting: No  Diarrhea: No  Dysuria/Freq.: No  Dysuria or Hematuria: No  Fatigue/Achiness: No  Sick/Strep Exposure: YES daughter tested positive yesterday  Therapies tried and outcome: Ibuprofen last night helped  "some            Objective    /70   Pulse 98   Temp 97  F (36.1  C) (Tympanic)   Resp 16   Ht 1.518 m (4' 11.75\")   Wt 98.4 kg (217 lb)   LMP  (LMP Unknown)   SpO2 97%   BMI 42.74 kg/m    Body mass index is 42.74 kg/m .  Physical Exam  Vitals reviewed.   HENT:      Right Ear: Tympanic membrane normal.      Left Ear: Tympanic membrane normal.      Nose: No congestion or rhinorrhea.      Mouth/Throat:      Pharynx: Posterior oropharyngeal erythema present. No oropharyngeal exudate.   Eyes:      Extraocular Movements: Extraocular movements intact.      Conjunctiva/sclera: Conjunctivae normal.      Pupils: Pupils are equal, round, and reactive to light.   Cardiovascular:      Rate and Rhythm: Normal rate and regular rhythm.   Pulmonary:      Effort: Pulmonary effort is normal.      Breath sounds: Normal breath sounds.                    Signed Electronically by: Audrey Duff MD    "

## 2024-03-25 NOTE — NURSING NOTE
"Chief Complaint   Patient presents with    Throat Problem       Initial /70   Pulse 98   Temp 97  F (36.1  C) (Tympanic)   Resp 16   Ht 1.518 m (4' 11.75\")   Wt 98.4 kg (217 lb)   LMP  (LMP Unknown)   SpO2 97%   BMI 42.74 kg/m   Estimated body mass index is 42.74 kg/m  as calculated from the following:    Height as of this encounter: 1.518 m (4' 11.75\").    Weight as of this encounter: 98.4 kg (217 lb).    Patient presents to the clinic using No DME    Is there anyone who you would like to be able to receive your results? No  If yes have patient fill out NISHI      "

## 2024-03-27 ENCOUNTER — OFFICE VISIT (OUTPATIENT)
Dept: NEUROLOGY | Facility: CLINIC | Age: 34
End: 2024-03-27
Attending: NURSE PRACTITIONER
Payer: COMMERCIAL

## 2024-03-27 DIAGNOSIS — R20.2 PARESTHESIAS: ICD-10-CM

## 2024-03-27 PROCEDURE — 95911 NRV CNDJ TEST 9-10 STUDIES: CPT | Performed by: PSYCHIATRY & NEUROLOGY

## 2024-03-27 PROCEDURE — 95886 MUSC TEST DONE W/N TEST COMP: CPT | Mod: RT | Performed by: PSYCHIATRY & NEUROLOGY

## 2024-03-27 NOTE — LETTER
3/27/2024         RE: Denise Cazares  [address] [address]        Dear Colleague,    Thank you for referring your patient, Denise Cazares, to the [facility] [place]. Please see a copy of my visit note below.    See procedure note.       Again, thank you for allowing me to participate in the care of your patient.        Sincerely,        Rufino Layne MD

## 2024-03-27 NOTE — PROCEDURES
[facility]    Formerly [facility]  [address] [place] [address], [address]  [place] [address]  Tel:  [phone]   Fax: [phone]    Patient: Denise KINNEY Gender: Female   MRN: [id number] : 1990       Visit Date: 3/27/2024 10:06:42 AM Interpreted by: Rufino Layne MD   Age: 34 years Ref Provider: Rea Guzman NP     Reason for visit:  Evaluate bilateral uppers. c/o numbness, tingling in right arm/hand/fingers > 2 years. Right > Left.   NCS+  Motor Sites      Latency Amplitude Distance Velocity   Site (ms) Norm (mV) Norm (cm) (m/s) Norm   Left Median (APB)   Wrist 2.9  < 4.5 7.1  > 3.0 7     Elbow 6.6 - 6.4 - 21.5 58  > 50   Right Median (APB)   Wrist 3.8  < 4.5 5.2  > 3.0 7     Elbow 7.4 - 5.2 - 23 64  > 50   Left Ulnar (ADM)   Wrist 2.3  < 3.6 7.1  > 5.0 7     Bel Elbow 4.7 - 6.5 - 21 88  > 50   Abv Elbow 6.2 - 5.6 - 13 87  > 50   Right Ulnar (ADM)   Wrist 1.95  < 3.6 8.4  > 5.0 7     Bel Elbow 4.6 - 8.3 - 21 78  > 50   Abv Elbow 6.3 - 7.9 - 13 76  > 50     NCS+  Sensory Sites      Onset Lat Peak Lat Amp (O-P) Distance Velocity   Site ms (ms)  V Norm cm m/s Norm   Left Median   Wrist-Dig II 1.90 2.4 77  > 15 13 68  > 50   Palm-Wrist 1.08 1.63 63 - 8 74 -   Right Median   Wrist-Dig II 2.3 2.8 67  > 15 13 57  > 50   Palm-Wrist 1.53 2.0 42 - 8 52 -   Left Ulnar   Wrist-Dig V 1.60 2.1 45  > 5 11 69  > 50   Palm-Wrist 1.08 1.60 16 - 8 74 -   Right Ulnar   Wrist-Dig V 1.70 2.3 41  > 5 11 65  > 50   Palm-Wrist 0.98 1.43 24 - 8 82 -                  Inter-Nerve Comparisons     Nerve 1 Value 1 Nerve 2 Value 2 Parameter Result Normal   Sensory Sites   R Median Palm-Wrist 2.0 ms R Ulnar Palm-Wrist 1.43 ms Peak Lat Diff 0.57 ms <0.40   L Median Palm-Wrist 1.63 ms L Ulnar Palm-Wrist 1.60 ms Peak Lat Diff 0.03 ms <0.40     F Wave Studies     NR F-Lat (ms) Lat Norm (ms) L-R F-Lat (ms) L-R Lat Norm   Left Ulnar (Abd Dig Min)      24.86 <32 2.20 <2.5   Right Ulnar  (Abd Dig Min)      22.66 <32 2.20 <2.5     Electromyography     Side Muscle Fib PSW Fasc IA # Amp Dur PPP RcmtRate Note Comment   Right Brachiorad None None None N N N N N N N    Right Pronator Teres None None None N N N N N N N    Right Biceps None None None N N N N N N N    Right Deltoid None None None N N N N N N N    Right Triceps None None None N N N N N N N    Right FCU None None None N N N N N N N    Right FDI None None None N N N N N N N    Right APB None None None N N N N N N N    Left Brachiorad None None None N N N N N N N    Left Pronator Teres None None None N N N N N N N    Left Biceps None None None N N N N N N N    Left Deltoid None None None N N N N N N N    Left Triceps None None None N N N N N N N    Left FDI None None None N N N N N N N    Left APB None None None N N N N N N N        SUMMARY  Nerve conduction and EMG study of bilateral upper extremities shows:    Normal right median nerve distal motor latency, amplitude and conduction velocity.  Normal left median nerve distal motor latency, amplitude and conduction velocity.  Normal right ulnar distal motor latency, amplitude and conduction velocity.  Normal left ulnar distal motor latency, amplitude and conduction velocity.  Normal bilateral median and ulnar sensory SNAPs.  Normal left median-ulnar transcarpal peak latency comparison.  Prolonged right median-ulnar transcarpal peak latency comparison.  Monopolar needle exam is normal.      CLINICAL INTERPRETATION:  This is an abnormal nerve conduction and EMG study.  The study is suggestive of a mild right median neuropathy (suspected carpal tunnel syndrome).  Further clinical correlation is needed.     Rufino Layne MD  Neurologist  [facility]  Tel:- [phone]

## 2024-03-27 NOTE — PROGRESS NOTES
"Rheumatology Clinic Visit  [facility]  RAOUL Kent     Denise Cazares MRN# [id number]   YOB: 1990 Age: 34 year old   Date of Visit: 4/5/2024  Primary care provider: [facility]          Assessment and Plan:     1.  Inflammatory arthritis  2.  Positive YOSELIN    Patient presents today for follow-up.  She states that her knees have been doing well.  The only joint pain issues that she is been having has been her right shoulder.  She states that this has been ongoing for many years and [place] were done and she remembers being asked if there was an injury.  She does not recall an injury.  Given the persistent pain that is been noticing in that shoulder we will get an x-ray today and refer her to orthopedics as this does not appear to be related to the inflammatory arthritis.  Otherwise as her joints have been doing well we will continue to monitor her off medication.  She will continue to work with her primary care provider regarding the carpal tunnel that was found.  She will follow-up with me in 4 to 6 months.      Plan:   Schedule follow-up with Bre Chanel PA-C in 4-6 months.   Imaging: xray right shoulder  Referral: orthopedics     RAOUL Kent  Rheumatology         History of Present Illness:   Denise Cazares presents for evaluation of polyarthralgia.      Interval history April 5, 2024:  She saw her PCP as she was having episodes of numbness in her hands. She had EMG and it showed carpal tunnel. She had a little knee pain this morning, but otherwise no joint pain. She has been having some shoulder pain. She has had this for \"years\". She has had imaging done that showed issues with her tendon or ligament. She cannot sleep on her right side due to the pain.If she does fall asleep she will wake up and the shoulder will be aching.     Interval history December 1, 2023:  She has been off the " "Hydroxychloroquine for about 3 months. She feels that she has been doing well enough to not need it. She tried taking 2/day but had diarrhea. She has noticed with the colder weather, increased knee pain. She has some aching in her hands with washing dishes with cold water. She has not had any swelling. She talked with her PCP regarding her liver function tests. US showed fatty liver. They discussed lifestyle changes. She has started seeing therapy to help work through everything with her daughter.       Interval history May 30, 2023:  She still has some pain in the left knee. At times she has a hard time walking, but overall she feels it is bearable. She does feel the Hydroxychloroquine is helping. She did have a week where she did not take it and had more pain. No skin rashes or GI upset. She did have some GI upset at the beginning, but she feels this has leveled off.     Interval history November 18, 2022:  She states that she spoke with her orthopedic provider and it did not think that anything needed to be done surgically.     She states that she has a hard time going up and down the stairs. She feels that her knee \"alexandru\". She woke up this morning with discomfort in her left knee. No skin rashes. No dry eyes or dry mouth. She has had some post-partum hair loss.     She states that her wrists have been okay. When she lays down and then goes to get up, she has pain in her heel, so she does limp until it resolves. She reports her  saying that she walks \"weird\". No Raynaud's. No shortness of breath, chest pain or pleurisy. No recent infections. No fevers.    HPI from Consult 07/25/2022:  She states that in April, she started to have pain in her left sciatic nerve. She then started to have pain on the top of her left foot by the ankle. She has a daughter with leukemia and was at the hospital. She then started to have her left knee swell. She had previously had swelling in ehr right knee with fluid removed. " "She got the liquid removed on the left. 1 week later, she needed it removed again. She then started to have pain in her wrists. She had a positive lyme and was given amoxicillin. She saw infectious disease but was told they were uncertain if she had lyme. 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 She had further blood showing antibodies for lyme. She feels that both of her knees are swollen and the bottom of her heel. She has pain when she first stands up on the heel and that resolves after walking a few steps. She states that it is hard to bend her knees in the morning. She also has hypermobile joints as well and there is question on if this is causing \"wear and tear\". She has seen orthopedics and PT was recommended. She is able to walk better since completing the antibiotic, but she continues to feel that there is a lot of swelling in her joints. She feels that she is worse in the mornings after periods of rest. No skin rashes. She has some fatigue, but she feels this is related to all the stress with her daughter.     Her mother complains about pain in her hands but is unsure if there is RA. No family history of lupus. No personal or family history of psoriasis, ulcerative colitis or crohn's.          Review of Systems:     Constitutional: negative  Skin: negative  Eyes: negative  Ears/Nose/Throat: negative  Respiratory: No shortness of breath, dyspnea on exertion, cough, or hemoptysis  Cardiovascular: negative  Gastrointestinal: negative  Genitourinary: negative  Musculoskeletal: as above  Neurologic: negative  Psychiatric: negative  Hematologic/Lymphatic/Immunologic: negative  Endocrine: negative         Active Problem List:     Patient Active Problem List    Diagnosis Date Noted    Nexplanon in place 12/14/2021 LT Arm 12/14/2021     Priority: Medium     Nexplanon placed 12/14/2021  LOT# [id number]  Exp: 04/09/2024  NDC# 72615-349-53    Dinora Morrison on 12/14/2021 at 9:53 AM          Class 2 " obesity in adult 10/12/2021     Priority: Medium    Right knee pain 02/26/2021     Priority: Medium    Hypothyroidism      Priority: Medium            Past Medical History:     Past Medical History:   Diagnosis Date    Anemia     Chickenpox     Endometrial polyp     Hypothyroidism      Past Surgical History:   Procedure Laterality Date    AS HYSTEROSCOPY W ENDOMETRIAL BX/POLYPECTOMY W/WO D&C      LAPAROSCOPIC CHOLECYSTECTOMY      TONSILLECTOMY              Social History:     Social History     Socioeconomic History    Marital status:      Spouse name: Not on file    Number of children: Not on file    Years of education: Not on file    Highest education level: Not on file   Occupational History    Not on file   Tobacco Use    Smoking status: Never    Smokeless tobacco: Never   Vaping Use    Vaping Use: Never used   Substance and Sexual Activity    Alcohol use: Not Currently     Comment: occasionally-quit with pregnnacy    Drug use: Never    Sexual activity: Yes     Partners: Male     Birth control/protection: None     Comment: Dominick   Other Topics Concern    Not on file   Social History Narrative    Not on file     Social Determinants of Health     Financial Resource Strain: Low Risk  (1/18/2024)    Financial Resource Strain     Within the past 12 months, have you or your family members you live with been unable to get utilities (heat, electricity) when it was really needed?: No   Food Insecurity: High Risk (1/18/2024)    Food Insecurity     Within the past 12 months, did you worry that your food would run out before you got money to buy more?: Yes     Within the past 12 months, did the food you bought just not last and you didn t have money to get more?: Patient declined   Transportation Needs: Low Risk  (1/18/2024)    Transportation Needs     Within the past 12 months, has lack of transportation kept you from medical appointments, getting your medicines, non-medical meetings or appointments, work, or from  "getting things that you need?: No   Physical Activity: Not on file   Stress: Not on file   Social Connections: Not on file   Interpersonal Safety: Low Risk  (10/31/2023)    Interpersonal Safety     Do you feel physically and emotionally safe where you currently live?: Yes     Within the past 12 months, have you been hit, slapped, kicked or otherwise physically hurt by someone?: No     Within the past 12 months, have you been humiliated or emotionally abused in other ways by your partner or ex-partner?: No   Housing Stability: High Risk (1/18/2024)    Housing Stability     Do you have housing? : No     Are you worried about losing your housing?: No          Family History:     Family History   Problem Relation Age of Onset    Diabetes Mother     Hypertension Mother     No Known Problems Father     Other - See Comments Maternal Grandfather         MVA            Allergies:   No Known Allergies         Medications:     Current Outpatient Medications   Medication Sig Dispense Refill    etonogestrel (NEXPLANON) 68 MG IMPL 1 each (68 mg) by Subdermal route once      lactobacillus rhamnosus, GG, (CULTURELL) capsule Take 1 capsule by mouth daily      levothyroxine (SYNTHROID/LEVOTHROID) 100 MCG tablet Take 1 tablet (100 mcg) by mouth daily 30 tablet 3    Multiple Vitamins-Minerals (MULTIVITAMIN ADULTS PO) Take 1 tablet by mouth daily              Physical Exam:   Blood pressure 105/68, pulse 69, resp. rate 18, height 1.518 m (4' 11.75\"), weight 98 kg (216 lb), SpO2 97%, currently breastfeeding.  Wt Readings from Last 6 Encounters:   03/25/24 98.4 kg (217 lb)   01/18/24 95.7 kg (211 lb)   12/01/23 97.5 kg (215 lb)   10/31/23 95.7 kg (211 lb)   08/21/23 94.9 kg (209 lb 3.2 oz)   05/30/23 92 kg (202 lb 12.8 oz)     Constitutional: well-developed, appearing stated age; cooperative  Eyes: nl  conjunctiva, sclera  ENT: nl external ears, nose, hearing, lips,   Resp: No shortness of breath with normal conversation  MSK: No active " synovitis or dactylitis.  Full fist formation.  No knee effusions.  No shoulder effusions  Psych: nl judgement, orientation, memory, affect.           Data:   Imaging:  MRI left knee 5/31/2022  Impression:   1. Nodular intra-articular foci as detailed above. Differential would   include infectious or inflammatory etiology. Nodular synovitis and/or   PVNS are also included in the differential. Consider referral to   orthopedic oncology at the [facility].     2. Repeat MRI with gadolinium and susceptibility weighted imaging   could be considered in 3-6 months.     Xray left foot 5/3/2022  IMPRESSION: No fractures are evident. Normal joint spacing and  alignment. The soft tissues are unremarkable.     MRI right knee 8/14/22  Impression: Large knee joint effusion with area of mild synovitis.  Given contralateral knee with possible synovitis finding, this may be  systemic condition. Especially with positive lyme antibodies,  differential consideration include lyme arthritis. No evidence of  erosion, reactive osteitis.       Ultrasound of her abdomen on 11/21/2023 shows hepatic steatosis and prior cholecystectomy  Laboratory:  5/11/2022  Uric acid 2.1  Rheumatoid factor 7  YOSELIN negative    5/18/2022  CCP antibody 2.6  CBC within normal parameters    6/24/2022  TSH 3.9, T4 0.76    7/15/2022  Creatinine 0.56, GFR greater than 80  ALT 32, AST 20  CRP 6.3  Vitamin D 18  Sed rate 22  LYme confirm IgG reactive, 9.0      Fluid analysis: 5/16/2022: 6K WBC, 38% PMNs, 36% lymphocytes. Cultures on broth showed likely contaminate of diptheroids, globicatella sanuinis. Lyme analysis showed reactive IgG    8/11/2022   CRP less than 2.9  Rheumatoid factor 8  Sed rate 15  YOSELIN borderline positive with a speckled pattern and a titer of 1: 80    12/1/2022  Creatinine 0.49, GFR greater than 90  Albumin 3.9  ALT 92, AST 30  Syndrome antibody negative  CRP 3.7  CBC within normal limits  Sed rate 10  C3 141, C4 30  Double-stranded  DNA negative  RNP equivocal  SCL 70 negative  Smith antibody negative  SSA and SSB negative    1/19/2023  Creatinine 0.53, GFR greater than 90  Albumin 4.4  ALT 40, AST 24  CRP less than 3.0  TSH 0.55  CBC within normal limits  Sed rate 11    5/30/2023  Creatinine 0.55, GFR greater than 90  Albumin 4.5  ALT 31, AST 18  CBC within normal limits  TSH 4.40    10/24/2023  Creatinine 0.59, GFR greater than 90  Albumin 4.2  , AST 57  White blood cell count 7.3, hemoglobin 12.1, platelet count 208  RNP antibody negative    10/31/2023  Creatinine 0.56, GFR greater than 90  , AST 64  Hepatitis C nonreactive

## 2024-04-05 ENCOUNTER — OFFICE VISIT (OUTPATIENT)
Dept: RHEUMATOLOGY | Facility: CLINIC | Age: 34
End: 2024-04-05
Payer: COMMERCIAL

## 2024-04-05 ENCOUNTER — ANCILLARY PROCEDURE (OUTPATIENT)
Dept: GENERAL RADIOLOGY | Facility: CLINIC | Age: 34
End: 2024-04-05
Attending: PHYSICIAN ASSISTANT
Payer: COMMERCIAL

## 2024-04-05 VITALS
HEART RATE: 69 BPM | SYSTOLIC BLOOD PRESSURE: 105 MMHG | DIASTOLIC BLOOD PRESSURE: 68 MMHG | WEIGHT: 216 LBS | HEIGHT: 60 IN | BODY MASS INDEX: 42.41 KG/M2 | OXYGEN SATURATION: 97 % | RESPIRATION RATE: 18 BRPM

## 2024-04-05 DIAGNOSIS — M19.90 INFLAMMATORY ARTHRITIS: Primary | ICD-10-CM

## 2024-04-05 DIAGNOSIS — M19.90 INFLAMMATORY ARTHRITIS: ICD-10-CM

## 2024-04-05 PROCEDURE — 99213 OFFICE O/P EST LOW 20 MIN: CPT | Performed by: PHYSICIAN ASSISTANT

## 2024-04-05 PROCEDURE — 73030 X-RAY EXAM OF SHOULDER: CPT | Mod: TC | Performed by: RADIOLOGY

## 2024-04-05 RX ORDER — LACTOBACILLUS RHAMNOSUS GG 10B CELL
1 CAPSULE ORAL DAILY
COMMUNITY

## 2024-04-05 ASSESSMENT — PAIN SCALES - GENERAL: PAINLEVEL: NO PAIN (0)

## 2024-04-05 NOTE — PATIENT INSTRUCTIONS
After Visit Instructions:     Thank you for coming to [facility] for your care. It is my goal to partner with you to help you reach your optimal state of health.       Plan:     Schedule follow-up with Bre Chanel PA-C in 4-6 months.   Imaging: xray right shoulder  Referral: orthopedics       Bre Chanel PA-C  [facility]  [facility]    Contact information: [facility]  Clinic Number:  [phone]  Please call or send a [email or web address] message with any questions about your care

## 2024-05-01 ENCOUNTER — TELEPHONE (OUTPATIENT)
Dept: RHEUMATOLOGY | Facility: CLINIC | Age: 34
End: 2024-05-01

## 2024-05-01 ENCOUNTER — OFFICE VISIT (OUTPATIENT)
Dept: ORTHOPEDICS | Facility: CLINIC | Age: 34
End: 2024-05-01
Attending: PHYSICIAN ASSISTANT
Payer: COMMERCIAL

## 2024-05-01 DIAGNOSIS — G89.29 CHRONIC RIGHT SHOULDER PAIN: ICD-10-CM

## 2024-05-01 DIAGNOSIS — M25.511 CHRONIC RIGHT SHOULDER PAIN: ICD-10-CM

## 2024-05-01 DIAGNOSIS — G56.01 CARPAL TUNNEL SYNDROME OF RIGHT WRIST: Primary | ICD-10-CM

## 2024-05-01 DIAGNOSIS — M25.562 PAIN IN BOTH KNEES, UNSPECIFIED CHRONICITY: Primary | ICD-10-CM

## 2024-05-01 DIAGNOSIS — M25.561 PAIN IN BOTH KNEES, UNSPECIFIED CHRONICITY: Primary | ICD-10-CM

## 2024-05-01 PROCEDURE — 99244 OFF/OP CNSLTJ NEW/EST MOD 40: CPT | Performed by: PEDIATRICS

## 2024-05-01 NOTE — PATIENT INSTRUCTIONS
Low suspicion for rotator cuff tear given current history and exam.  Recommended rest from irritating activities coupled with physical therapy.  Would consider further imaging or treatment pending clinical course.    Discussed anatomy and pathophysiology of carpal tunnel. Discussed avoidance of exacerbating activities and use of braces.  Also discussed formal occupational hand therapy, potential referral for injections or surgery.    Plan:  - Today's Plan of Care:  Rehab: [facility] - [phone]  Wrist brace for wrist    Discussed activity considerations and other supportive care including Ice/Heat, OTC and other topical medications as needed.    -We also discussed other future treatment options:  MRI right shoulder  Referral to occupational therapy for right hand  Consideration of corticosteroid injections  Referral to hand surgery    Follow Up: 6 - 8 weeks and as needed    If you have any further questions for your physician or physician s care team you can call [phone] and use option 3 to leave a voice message.

## 2024-05-01 NOTE — Clinical Note
Denise Saunders asked for a PT order for her knees today (I placed a referral for her shoulder). As I have not evaluated her knees, I told her I would send you a message. Are you able to place this referral? Thanks. Orly

## 2024-05-01 NOTE — PROGRESS NOTES
ASSESSMENT & PLAN    Denise was seen today for pain.    Diagnoses and all orders for this visit:    Carpal tunnel syndrome of right wrist    Chronic right shoulder pain  -     Orthopedic  Referral  -     Physical Therapy  Referral; Future      This issue is chronic and Unchanged.        ICD-10-CM    1. Carpal tunnel syndrome of right wrist  G56.01 Wrist/Arm/Hand Bracking Supplies Order Wrist Brace; Right; non-thumb spica      2. Chronic right shoulder pain  M25.511 Orthopedic  Referral    G89.29 Physical Therapy  Referral     Wrist/Arm/Hand Bracking Supplies Order Wrist Brace; Right; non-thumb spica        Patient Instructions   Low suspicion for rotator cuff tear given current history and exam.  Recommended rest from irritating activities coupled with physical therapy.  Would consider further imaging or treatment pending clinical course.    Discussed anatomy and pathophysiology of carpal tunnel. Discussed avoidance of exacerbating activities and use of braces.  Also discussed formal occupational hand therapy, potential referral for injections or surgery.    Plan:  - Today's Plan of Care:  Rehab: Physical Therapy: [facility] - [phone]  Wrist brace for wrist    Discussed activity considerations and other supportive care including Ice/Heat, OTC and other topical medications as needed.    -We also discussed other future treatment options:  MRI right shoulder  Referral to occupational therapy for right hand  Consideration of corticosteroid injections  Referral to hand surgery    Follow Up: 6 - 8 weeks and as needed    Concerning signs and symptoms were reviewed and all questions were answered at this time.    Thanks for the opportunity to participate in the care of this patient, I will keep you updated on their progress.    CC: Bre Klein MD [facility]  Sports Medicine Physician  [facility]    -----  Chief Complaint    Patient presents with    Right Shoulder - Pain       SUBJECTIVE  Denisehemant Cazares is a/an 34 year old female who is seen in consultation at the request of  Bre Chanel PA-C for evaluation of right shoulder pain, also has hand numbness.    The patient is seen with her   The patient is Right handed    Onset: 10 years(s) ago. Reports insidious onset without acute precipitating event.  Location of Pain: right shoulder, across the superior aspect  Worsened by: sleeping on it, nothing else   Better with: avoiding sleeping on it  Treatments tried: previous imaging (xray 4/5/24, EMG 3/27/24)  Associated symptoms: numbness and tinglingnumbness in her fingers, just had EMG.  - Saw rheumatology who referred to our clinic    Orthopedic/Surgical history: Yes - previously saw Orthopedics for bilateral knee effusions  Social History/Occupation: [employer].       REVIEW OF SYSTEMS:  Review of Systems    OBJECTIVE:  LMP  (LMP Unknown)    General: healthy, alert and in no distress  Skin: no suspicious lesions or rash.  CV: distal perfusion intact   Resp: normal respiratory effort without conversational dyspnea   Psych: normal mood and affect  Gait: NORMAL  Neuro: Normal light sensory exam of upper extremity    Bilateral Shoulder exam    Inspection and Posture:       normal    Skin:        no visible deformities    Tender:        none    Non Tender:       remainder of shoulder bilateral    ROM:        Full active and passive ROM with flexion, extension, abduction, internal and external rotation right       asymmetric scapular motion    Painful motions:       end range flexion and elevation right    Strength:        abduction 5/5 bilateral       flexion 5/5 bilateral       internal rotation 5/5 bilateral       external rotation 5/5 bilateral    Impingement testing:       neg (-) Neer right       neg (-) Hernandez right    Sensation:        normal sensation over shoulder and upper extremity     Right Wrist and Hand  exam    Inspection:       No swelling, bruising or deformity bilateral    Tender:       none    Non Tender:       Remainder of the Wrist and Hand bilateral    ROM:       Full and symmetric active and passive range of motion of the forearm, wrist and digits bilateral    Strength:       5/5 strength in the muscles of the hand, wrist and forearm bilateral    Special Tests:        positive (+) Tinel's test right and       neg (-) Phalen's test right    Neurovascular:       2+ radial pulses bilaterally with brisk capillary refill and      normal sensation to light touch in the radial, median and ulnar nerve distributions      RADIOLOGY:  Final results and radiologist's interpretation, available in the [facility] health record.  Images were reviewed with the patient in the office today.  My personal interpretation of the performed imaging:     Reviewed right shoulder XR 4/5/2024 - acute bony abnormality, no significant degenerative change    Results for orders placed or performed in visit on 04/05/24   XR Shoulder Right 2 Views    Narrative    EXAM: XR SHOULDER RIGHT 2 VIEWS  DATE/TIME: 4/5/2024 10:38 AM    INDICATION: Inflammatory arthritis  COMPARISON: None available.       Impression    IMPRESSION: Normal joint spaces and alignment. No definite fracture.     FERNANDO MDEINA DO         SYSTEM ID:  [id number]       EMG completed 3/27/24;     Review of prior external note(s) from - Orthopedics, Rheumatology  Review of the result(s) of each unique test - XR and EMG

## 2024-05-01 NOTE — LETTER
5/1/2024         RE: Denise Cazares  [address] [facility] [address]        Dear Colleague,    Thank you for referring your patient, Denise Cazares, to the [facility] [place]. Please see a copy of my visit note below.    ASSESSMENT & PLAN    Denise was seen today for pain.    Diagnoses and all orders for this visit:    Carpal tunnel syndrome of right wrist    Chronic right shoulder pain  -     Orthopedic  Referral  -     Physical Therapy  Referral; Future      This issue is chronic and Unchanged.        ICD-10-CM    1. Carpal tunnel syndrome of right wrist  G56.01 Wrist/Arm/Hand Bracking Supplies Order Wrist Brace; Right; non-thumb spica      2. Chronic right shoulder pain  M25.511 Orthopedic  Referral    G89.29 Physical Therapy  Referral     Wrist/Arm/Hand Bracking Supplies Order Wrist Brace; Right; non-thumb spica        Patient Instructions   Low suspicion for rotator cuff tear given current history and exam.  Recommended rest from irritating activities coupled with physical therapy.  Would consider further imaging or treatment pending clinical course.    Discussed anatomy and pathophysiology of carpal tunnel. Discussed avoidance of exacerbating activities and use of braces.  Also discussed formal occupational hand therapy, potential referral for injections or surgery.    Plan:  - Today's Plan of Care:  Rehab: Physical Therapy: [facility] - [phone]  Wrist brace for wrist    Discussed activity considerations and other supportive care including Ice/Heat, OTC and other topical medications as needed.    -We also discussed other future treatment options:  MRI right shoulder  Referral to occupational therapy for right hand  Consideration of corticosteroid injections  Referral to hand surgery    Follow Up: 6 - 8 weeks and as needed    Concerning signs and symptoms were reviewed and all questions were answered at this  time.    Thanks for the opportunity to participate in the care of this patient, I will keep you updated on their progress.    CC: Bre Klein MD [facility]  Sports Medicine Physician  [facility]    -----  Chief Complaint   Patient presents with     Right Shoulder - Pain       SUBJECTIVE  Denise Cazares is a/an 34 year old female who is seen in consultation at the request of  Bre Chanel PA-C for evaluation of right shoulder pain, also has hand numbness.    The patient is seen with her   The patient is Right handed    Onset: 10 years(s) ago. Reports insidious onset without acute precipitating event.  Location of Pain: right shoulder, across the superior aspect  Worsened by: sleeping on it, nothing else   Better with: avoiding sleeping on it  Treatments tried: previous imaging (xray 4/5/24, EMG 3/27/24)  Associated symptoms: numbness and tinglingnumbness in her fingers, just had EMG.  - Saw rheumatology who referred to our clinic    Orthopedic/Surgical history: Yes - previously saw Orthopedics for bilateral knee effusions  Social History/Occupation: [employer].       REVIEW OF SYSTEMS:  Review of Systems    OBJECTIVE:  LMP  (LMP Unknown)    General: healthy, alert and in no distress  Skin: no suspicious lesions or rash.  CV: distal perfusion intact   Resp: normal respiratory effort without conversational dyspnea   Psych: normal mood and affect  Gait: NORMAL  Neuro: Normal light sensory exam of upper extremity    Bilateral Shoulder exam    Inspection and Posture:       normal    Skin:        no visible deformities    Tender:        none    Non Tender:       remainder of shoulder bilateral    ROM:        Full active and passive ROM with flexion, extension, abduction, internal and external rotation right       asymmetric scapular motion    Painful motions:       end range flexion and elevation right    Strength:        abduction 5/5 bilateral       flexion 5/5  bilateral       internal rotation 5/5 bilateral       external rotation 5/5 bilateral    Impingement testing:       neg (-) Neer right       neg (-) Hernandez right    Sensation:        normal sensation over shoulder and upper extremity     Right Wrist and Hand exam    Inspection:       No swelling, bruising or deformity bilateral    Tender:       none    Non Tender:       Remainder of the Wrist and Hand bilateral    ROM:       Full and symmetric active and passive range of motion of the forearm, wrist and digits bilateral    Strength:       5/5 strength in the muscles of the hand, wrist and forearm bilateral    Special Tests:        positive (+) Tinel's test right and       neg (-) Phalen's test right    Neurovascular:       2+ radial pulses bilaterally with brisk capillary refill and      normal sensation to light touch in the radial, median and ulnar nerve distributions      RADIOLOGY:  Final results and radiologist's interpretation, available in the [facility] health record.  Images were reviewed with the patient in the office today.  My personal interpretation of the performed imaging:     Reviewed right shoulder XR 4/5/2024 - acute bony abnormality, no significant degenerative change    Results for orders placed or performed in visit on 04/05/24   XR Shoulder Right 2 Views    Narrative    EXAM: XR SHOULDER RIGHT 2 VIEWS  DATE/TIME: 4/5/2024 10:38 AM    INDICATION: Inflammatory arthritis  COMPARISON: None available.       Impression    IMPRESSION: Normal joint spaces and alignment. No definite fracture.     FERNANDO MEDINA DO         SYSTEM ID:  [id number]       EMG completed 3/27/24;     Review of prior external note(s) from - Orthopedics, Rheumatology  Review of the result(s) of each unique test - XR and EMG             Again, thank you for allowing me to participate in the care of your patient.        Sincerely,        Orly Klein MD

## 2024-05-14 ENCOUNTER — THERAPY VISIT (OUTPATIENT)
Dept: PHYSICAL THERAPY | Facility: CLINIC | Age: 34
End: 2024-05-14
Attending: PEDIATRICS
Payer: COMMERCIAL

## 2024-05-14 DIAGNOSIS — G89.29 CHRONIC RIGHT SHOULDER PAIN: ICD-10-CM

## 2024-05-14 DIAGNOSIS — M25.511 CHRONIC RIGHT SHOULDER PAIN: ICD-10-CM

## 2024-05-14 PROCEDURE — 97161 PT EVAL LOW COMPLEX 20 MIN: CPT | Mod: GP | Performed by: PHYSICAL THERAPIST

## 2024-05-14 PROCEDURE — 97110 THERAPEUTIC EXERCISES: CPT | Mod: GP | Performed by: PHYSICAL THERAPIST

## 2024-05-14 ASSESSMENT — ACTIVITIES OF DAILY LIVING (ADL)
STIFFNESS: I DO NOT HAVE THE SYMPTOM
RISE FROM A CHAIR: ACTIVITY IS NOT DIFFICULT
AT_ITS_WORST?: 4
GIVING WAY, BUCKLING OR SHIFTING OF KNEE: I HAVE THE SYMPTOM BUT IT DOES NOT AFFECT MY ACTIVITY
WEAKNESS: I DO NOT HAVE THE SYMPTOM
HOW_WOULD_YOU_RATE_THE_CURRENT_FUNCTION_OF_YOUR_KNEE_DURING_YOUR_USUAL_DAILY_ACTIVITIES_ON_A_SCALE_FROM_0_TO_100_WITH_100_BEING_YOUR_LEVEL_OF_KNEE_FUNCTION_PRIOR_TO_YOUR_INJURY_AND_0_BEING_THE_INABILITY_TO_PERFORM_ANY_OF_YOUR_USUAL_DAILY_ACTIVITIES?: 30
STAND: ACTIVITY IS NOT DIFFICULT
HOW_WOULD_YOU_RATE_THE_OVERALL_FUNCTION_OF_YOUR_KNEE_DURING_YOUR_USUAL_DAILY_ACTIVITIES?: NEARLY NORMAL
LIMPING: I DO NOT HAVE THE SYMPTOM
GIVING WAY, BUCKLING OR SHIFTING OF KNEE: I HAVE THE SYMPTOM BUT IT DOES NOT AFFECT MY ACTIVITY
STAND: ACTIVITY IS NOT DIFFICULT
WALK: ACTIVITY IS MINIMALLY DIFFICULT
PLACING_AN_OBJECT_ON_A_HIGH_SHELF: 0
RAW_SCORE: 61
PLEASE_INDICATE_YOR_PRIMARY_REASON_FOR_REFERRAL_TO_THERAPY:: SHOULDER
LIMPING: I DO NOT HAVE THE SYMPTOM
PLEASE_INDICATE_YOR_PRIMARY_REASON_FOR_REFERRAL_TO_THERAPY:: KNEE
PAIN: I DO NOT HAVE THE SYMPTOM
GO DOWN STAIRS: ACTIVITY IS SOMEWHAT DIFFICULT
HOW_WOULD_YOU_RATE_THE_CURRENT_FUNCTION_OF_YOUR_KNEE_DURING_YOUR_USUAL_DAILY_ACTIVITIES_ON_A_SCALE_FROM_0_TO_100_WITH_100_BEING_YOUR_LEVEL_OF_KNEE_FUNCTION_PRIOR_TO_YOUR_INJURY_AND_0_BEING_THE_INABILITY_TO_PERFORM_ANY_OF_YOUR_USUAL_DAILY_ACTIVITIES?: 30
GO UP STAIRS: ACTIVITY IS NOT DIFFICULT
WASHING_YOUR_BACK: 0
SIT WITH YOUR KNEE BENT: ACTIVITY IS FAIRLY DIFFICULT
PUTTING_ON_AN_UNDERSHIRT_OR_A_PULLOVER_SWEATER: 0
PAIN: I DO NOT HAVE THE SYMPTOM
KNEEL ON THE FRONT OF YOUR KNEE: ACTIVITY IS MINIMALLY DIFFICULT
AS_A_RESULT_OF_YOUR_KNEE_INJURY,_HOW_WOULD_YOU_RATE_YOUR_CURRENT_LEVEL_OF_DAILY_ACTIVITY?: NEARLY NORMAL
SQUAT: ACTIVITY IS MINIMALLY DIFFICULT
REMOVING_SOMETHING_FROM_YOUR_BACK_POCKET: 0
TOUCHING_THE_BACK_OF_YOUR_NECK: 0
REACHING_FOR_SOMETHING_ON_A_HIGH_SHELF: 1
SQUAT: ACTIVITY IS MINIMALLY DIFFICULT
WHEN_LYING_ON_THE_INVOLVED_SIDE: 5
WASHING_YOUR_HAIR?: 0
HOW_WOULD_YOU_RATE_THE_OVERALL_FUNCTION_OF_YOUR_KNEE_DURING_YOUR_USUAL_DAILY_ACTIVITIES?: NEARLY NORMAL
RISE FROM A CHAIR: ACTIVITY IS NOT DIFFICULT
STIFFNESS: I DO NOT HAVE THE SYMPTOM
GO DOWN STAIRS: ACTIVITY IS SOMEWHAT DIFFICULT
SWELLING: I DO NOT HAVE THE SYMPTOM
KNEE_ACTIVITY_OF_DAILY_LIVING_SUM: 61
SWELLING: I DO NOT HAVE THE SYMPTOM
PUTTING_ON_YOUR_PANTS: 0
KNEEL ON THE FRONT OF YOUR KNEE: ACTIVITY IS MINIMALLY DIFFICULT
WEAKNESS: I DO NOT HAVE THE SYMPTOM
KNEE_ACTIVITY_OF_DAILY_LIVING_SCORE: 87.14
WALK: ACTIVITY IS MINIMALLY DIFFICULT
SIT WITH YOUR KNEE BENT: ACTIVITY IS FAIRLY DIFFICULT
CARRYING_A_HEAVY_OBJECT_OF_10_POUNDS: 0
PUSHING_WITH_THE_INVOLVED_ARM: 0
GO UP STAIRS: ACTIVITY IS NOT DIFFICULT
PUTTING_ON_A_SHIRT_THAT_BUTTONS_DOWN_THE_FRONT: 0
AS_A_RESULT_OF_YOUR_KNEE_INJURY,_HOW_WOULD_YOU_RATE_YOUR_CURRENT_LEVEL_OF_DAILY_ACTIVITY?: NEARLY NORMAL

## 2024-05-14 NOTE — PROGRESS NOTES
"PHYSICAL THERAPY EVALUATION  Type of Visit: Evaluation    See electronic medical record for Abuse and Falls Screening details.    Subjective Pain sleeping on right side, carrying 2.6yo DTR. Can feel the shoulder when carrying her. Otherwise doesn't really bother during the day. Does get some mild tingling in R fingers with driving, eating. EMG showed mild carpal tunnel, has wrist splint for now. Will be starting knee PT this week      Presenting condition or subjective complaint: Right shoulder pain when sleeping on right side  Date of onset: 05/01/24    Relevant medical history: Currently pregnant or breastfeeding; Overweight; Thyroid problems   Dates & types of surgery: Tosillectomy 2008, gallbladder removal June 2018    Prior diagnostic imaging/testing results: MRI; X-ray     Prior therapy history for the same diagnosis, illness or injury: No      Prior Level of Function  Transfers: Independent  Ambulation: Independent  ADL: Independent  IADL:     Living Environment  Social support: With family members   Type of home: House   Stairs to enter the home: Yes 8 Is there a railing: Yes   Ramp: No   Stairs inside the home: Yes 8 Is there a railing: Yes   Help at home: None  Equipment owned:       Employment: No    Hobbies/Interests:  care for child    Patient goals for therapy: Sleep on it    Pain assessment:      Objective   SHOULDER EVALUATION  PAIN: can wake her  INTEGUMENTARY (edema, incisions):   POSTURE: Standing Posture: Rounded shoulders, Forward head B genu valgum  GAIT:   Weightbearing Status:   Assistive Device(s):   Gait Deviations:   BALANCE/PROPRIOCEPTION:   WEIGHTBEARING ALIGNMENT:   ROM: B shoulders WNL, \"can feel it\" end range flexion    STRENGTH: B shoulder ER, IR 5/5, bicep, tricep 5/5  FLEXIBILITY:   SPECIAL TESTS: neg impingement  PALPATION: no specific tenderness, elevated right rib1  JOINT MOBILITY:   CERVICAL SCREEN: neg Adson's. , mild tingling R fingers with pec stretch in " doorway    Assessment & Plan   CLINICAL IMPRESSIONS  Medical Diagnosis: chronic shoulder pain    Treatment Diagnosis: R shoulder pain   Impression/Assessment: Patient is a 34 year old female with right sholder complaints.  The following significant findings have been identified: Pain and Impaired posture. These impairments interfere with their ability to perform self care tasks and household chores as compared to previous level of function.     Clinical Decision Making (Complexity):  Clinical Presentation: Stable/Uncomplicated  Clinical Presentation Rationale: based on medical and personal factors listed in PT evaluation  Clinical Decision Making (Complexity): Low complexity    PLAN OF CARE  Treatment Interventions:  Interventions: Manual Therapy, Neuromuscular Re-education, Therapeutic Exercise, Self-Care/Home Management    Long Term Goals     PT Goal 1  Goal Identifier: 1  Goal Description: pt will be able to sleep half the night onR side in 6wk  Target Date: 06/25/24  PT Goal 2  Goal Identifier: 2  Goal Description: pt will be able to carry DTR w/o pain in 6wk  Target Date: 06/25/24      Frequency of Treatment: 1x/wk  Duration of Treatment: 6wks    Recommended Referrals to Other Professionals:   Education Assessment:   Learner/Method: Patient;Pictures/Video;No Barriers to Learning    Risks and benefits of evaluation/treatment have been explained.   Patient/Family/caregiver agrees with Plan of Care.     Evaluation Time:     PT Eval, Low Complexity Minutes (00236): 15       Signing Clinician: Kris Hoenk, PT [facility]                                                                                   OUTPATIENT PHYSICAL THERAPY      PLAN OF TREATMENT FOR OUTPATIENT REHABILITATION   Patient's Last Name, First Name, M.Denise Zaman YOB: 1990   Provider's Name   [facility]   Medical Record No.  [id number]     Onset Date: 05/01/24   Start of Care Date: 05/14/24     Medical Diagnosis:  chronic shoulder pain      PT Treatment Diagnosis:  R shoulder pain Plan of Treatment  Frequency/Duration: 1x/wk/ 6wks    Certification date from 05/14/24 to 06/25/24         See note for plan of treatment details and functional goals     Kris Hoenk, PT                         I CERTIFY THE NEED FOR THESE SERVICES FURNISHED UNDER        THIS PLAN OF TREATMENT AND WHILE UNDER MY CARE     (Physician attestation of this document indicates review and certification of the therapy plan).              Referring Provider:  Orly Klein    Initial Assessment  See Epic Evaluation- Start of Care Date: 05/14/24

## 2024-05-16 ENCOUNTER — THERAPY VISIT (OUTPATIENT)
Dept: PHYSICAL THERAPY | Facility: CLINIC | Age: 34
End: 2024-05-16
Attending: PHYSICIAN ASSISTANT
Payer: COMMERCIAL

## 2024-05-16 DIAGNOSIS — M25.561 PAIN IN BOTH KNEES, UNSPECIFIED CHRONICITY: ICD-10-CM

## 2024-05-16 DIAGNOSIS — M25.562 PAIN IN BOTH KNEES, UNSPECIFIED CHRONICITY: ICD-10-CM

## 2024-05-16 PROCEDURE — 97110 THERAPEUTIC EXERCISES: CPT | Mod: GP | Performed by: PHYSICAL MEDICINE & REHABILITATION

## 2024-05-16 PROCEDURE — 97161 PT EVAL LOW COMPLEX 20 MIN: CPT | Mod: GP | Performed by: PHYSICAL MEDICINE & REHABILITATION

## 2024-05-16 ASSESSMENT — ACTIVITIES OF DAILY LIVING (ADL)
STAND: ACTIVITY IS NOT DIFFICULT
PAIN: THE SYMPTOM AFFECTS MY ACTIVITY SLIGHTLY
SQUAT: ACTIVITY IS FAIRLY DIFFICULT
SQUAT: ACTIVITY IS FAIRLY DIFFICULT
STIFFNESS: THE SYMPTOM AFFECTS MY ACTIVITY SLIGHTLY
RISE FROM A CHAIR: ACTIVITY IS NOT DIFFICULT
LIMPING: I DO NOT HAVE THE SYMPTOM
LIMPING: I DO NOT HAVE THE SYMPTOM
RAW_SCORE: 58
WEAKNESS: I DO NOT HAVE THE SYMPTOM
KNEE_ACTIVITY_OF_DAILY_LIVING_SUM: 58
SIT WITH YOUR KNEE BENT: ACTIVITY IS NOT DIFFICULT
KNEEL ON THE FRONT OF YOUR KNEE: ACTIVITY IS SOMEWHAT DIFFICULT
KNEEL ON THE FRONT OF YOUR KNEE: ACTIVITY IS SOMEWHAT DIFFICULT
GO DOWN STAIRS: ACTIVITY IS MINIMALLY DIFFICULT
GIVING WAY, BUCKLING OR SHIFTING OF KNEE: I HAVE THE SYMPTOM BUT IT DOES NOT AFFECT MY ACTIVITY
PLEASE_INDICATE_YOR_PRIMARY_REASON_FOR_REFERRAL_TO_THERAPY:: KNEE
WALK: ACTIVITY IS MINIMALLY DIFFICULT
WEAKNESS: I DO NOT HAVE THE SYMPTOM
WALK: ACTIVITY IS MINIMALLY DIFFICULT
KNEE_ACTIVITY_OF_DAILY_LIVING_SCORE: 82.86
RISE FROM A CHAIR: ACTIVITY IS NOT DIFFICULT
HOW_WOULD_YOU_RATE_THE_OVERALL_FUNCTION_OF_YOUR_KNEE_DURING_YOUR_USUAL_DAILY_ACTIVITIES?: NORMAL
HOW_WOULD_YOU_RATE_THE_OVERALL_FUNCTION_OF_YOUR_KNEE_DURING_YOUR_USUAL_DAILY_ACTIVITIES?: NORMAL
STAND: ACTIVITY IS NOT DIFFICULT
GO DOWN STAIRS: ACTIVITY IS MINIMALLY DIFFICULT
STIFFNESS: THE SYMPTOM AFFECTS MY ACTIVITY SLIGHTLY
GO UP STAIRS: ACTIVITY IS NOT DIFFICULT
SWELLING: I DO NOT HAVE THE SYMPTOM
SWELLING: I DO NOT HAVE THE SYMPTOM
AS_A_RESULT_OF_YOUR_KNEE_INJURY,_HOW_WOULD_YOU_RATE_YOUR_CURRENT_LEVEL_OF_DAILY_ACTIVITY?: NORMAL
AS_A_RESULT_OF_YOUR_KNEE_INJURY,_HOW_WOULD_YOU_RATE_YOUR_CURRENT_LEVEL_OF_DAILY_ACTIVITY?: NORMAL
GIVING WAY, BUCKLING OR SHIFTING OF KNEE: I HAVE THE SYMPTOM BUT IT DOES NOT AFFECT MY ACTIVITY
PAIN: THE SYMPTOM AFFECTS MY ACTIVITY SLIGHTLY
GO UP STAIRS: ACTIVITY IS NOT DIFFICULT
SIT WITH YOUR KNEE BENT: ACTIVITY IS NOT DIFFICULT

## 2024-05-16 NOTE — PROGRESS NOTES
PHYSICAL THERAPY EVALUATION  Type of Visit: Evaluation    See electronic medical record for Abuse and Falls Screening details.    Subjective       Presenting condition or subjective complaint: L knee pain is worse than R. Notices pain when she lunges when she exercises, kneeling on the ground, and down several flight of stairs. Notes that she feels like she can't bend it fully.   Date of onset: 05/01/24    Relevant medical history: Currently pregnant or breastfeeding; Overweight; Thyroid problems   Dates & types of surgery: Tosillectomy 2008, gallbladder removal June 2018    Prior diagnostic imaging/testing results: MRI; X-ray     MRI 2 years ago - infectious or inflammatory etiology    Prior therapy history for the same diagnosis, illness or injury: No      Living Environment  Social support: With family members   Type of home: House   Stairs to enter the home: Yes 8 Is there a railing: Yes   Ramp: No   Stairs inside the home: Yes 8 Is there a railing: Yes   Help at home: None  Equipment owned:       Employment: No    Hobbies/Interests:      Patient goals for therapy: to go up/down stairs, lunge/workout, and kneeling on knee with less pain     Objective   KNEE EVALUATION  PAIN: Pain Level at Rest: 2/10  Pain Level with Use: 7/10  Pain Location: knee  Pain Quality: Aching, Dull, and Sharp  Pain Frequency: intermittent or daily  Pain is Worst: daytime  Pain is Exacerbated By: stairs, lunging, bending  Pain is Relieved By: rest  INTEGUMENTARY (edema, incisions): WFL  POSTURE: WFL  GAIT:  Weightbearing Status: WBAT  Assistive Device(s): None  Gait Deviations: WFL; genu valgus B knees  BALANCE/PROPRIOCEPTION: WFL  WEIGHTBEARING ALIGNMENT: Knee WB Alignment:Genu valgus L, Genu valgus R  ROM: AROM WFL; pain with L knee flexion end range  STRENGTH:   Pain: - none + mild ++ moderate +++ severe  Strength Scale: 0-5/5 Left Right   Knee Flexion 4    Knee Extension 4-, ++ (mod)    Quad Set 4      FLEXIBILITY: Decreased  quadriceps L, Decreased hamstrings L, Decreased gastroc L, Decreased quadriceps R, Decreased hamstrings R, Decreased gastroc R  SPECIAL TESTS:  negative Wilmer's, valgus/varus stress, and ant/post drawer tests  FUNCTIONAL TESTS: Double Leg Squat: Anterior knee translation, Knee valgus, Hip internal rotation, Improper use of glutes/hips, and Impaired weight distribution  PALPATION:  tenderness with light pressure to L patellar tendon  JOINT MOBILITY:  impaired anterior tibiofemoral glide LLE    Assessment & Plan   CLINICAL IMPRESSIONS  Medical Diagnosis: Pain in both knees, unspecified chronicity (M25.561, M25.562)  - Primary    Treatment Diagnosis: L knee pain and weakness   Impression/Assessment: Patient is a 34 year old female with left knee complaints.  The following significant findings have been identified: Pain, Decreased strength, and Impaired muscle performance. These impairments interfere with their ability to perform recreational activities, household chores, household mobility, and community mobility as compared to previous level of function.     Clinical Decision Making (Complexity):  Clinical Presentation: Stable/Uncomplicated  Clinical Presentation Rationale: based on medical and personal factors listed in PT evaluation  Clinical Decision Making (Complexity): Low complexity    PLAN OF CARE  Treatment Interventions:  Interventions: Manual Therapy, Neuromuscular Re-education, Therapeutic Activity, Therapeutic Exercise    Long Term Goals     PT Goal 1  Goal Identifier: STG  Goal Description: Patient to walk up/down full flight of stairs not using railing without L knee pain.  Rationale: to maximize safety and independence with performance of ADLs and functional tasks  Target Date: 06/13/24  PT Goal 2  Goal Identifier: LTG  Goal Description: Patient to return to working out using weights involving squats/lunges to build knee strength long term.  Rationale: to maximize safety and independence with  performance of ADLs and functional tasks  Target Date: 07/11/24  PT Goal 3  Goal Identifier: LTG  Goal Description: Patient to tolerate kneeling on her L knee while playing with her child without limitations of pain/weakness.  Rationale: to maximize safety and independence with performance of ADLs and functional tasks  Target Date: 07/11/24      Frequency of Treatment: 1x/week  Duration of Treatment: 8 weeks    Education Assessment:   Learner/Method: Patient;Pictures/Video;No Barriers to Learning    Risks and benefits of evaluation/treatment have been explained.   Patient/Family/caregiver agrees with Plan of Care.     Evaluation Time:     PT Eval, Low Complexity Minutes (22034): 14     Signing Clinician: Gatito Barrera, PT      [facility]                                                                                   OUTPATIENT PHYSICAL THERAPY      PLAN OF TREATMENT FOR OUTPATIENT REHABILITATION   Patient's Last Name, First Name, M.IEsvin CazaresDenise  GREGORIA YOB: 1990   Provider's Name   [facility]   Medical Record No.  [id number]     Onset Date: 05/01/24  Start of Care Date: 05/16/24     Medical Diagnosis:  Pain in both knees, unspecified chronicity (M25.561, M25.562)  - Primary      PT Treatment Diagnosis:  L knee pain and weakness Plan of Treatment  Frequency/Duration: 1x/week/ 8 weeks    Certification date from 05/16/24 to 07/11/24         See note for plan of treatment details and functional goals     Gatito Barrera, PT                         I CERTIFY THE NEED FOR THESE SERVICES FURNISHED UNDER        THIS PLAN OF TREATMENT AND WHILE UNDER MY CARE     (Physician attestation of this document indicates review and certification of the therapy plan).              Referring Provider:  Bre Chanel    Initial Assessment  See Epic Evaluation- Start of Care Date: 05/16/24

## 2024-05-21 ENCOUNTER — LAB (OUTPATIENT)
Dept: LAB | Facility: CLINIC | Age: 34
End: 2024-05-21
Payer: COMMERCIAL

## 2024-05-21 DIAGNOSIS — E03.9 HYPOTHYROIDISM, UNSPECIFIED TYPE: ICD-10-CM

## 2024-05-21 LAB
ANION GAP SERPL CALCULATED.3IONS-SCNC: 9 MMOL/L (ref 7–15)
BUN SERPL-MCNC: 9 MG/DL (ref 6–20)
CALCIUM SERPL-MCNC: 8.8 MG/DL (ref 8.6–10)
CHLORIDE SERPL-SCNC: 108 MMOL/L (ref 98–107)
CREAT SERPL-MCNC: 0.41 MG/DL (ref 0.51–0.95)
DEPRECATED HCO3 PLAS-SCNC: 26 MMOL/L (ref 22–29)
EGFRCR SERPLBLD CKD-EPI 2021: >90 ML/MIN/1.73M2
GLUCOSE SERPL-MCNC: 93 MG/DL (ref 70–99)
HBA1C MFR BLD: 6 % (ref 0–5.6)
POTASSIUM SERPL-SCNC: 3.9 MMOL/L (ref 3.4–5.3)
SODIUM SERPL-SCNC: 143 MMOL/L (ref 135–145)
TSH SERPL DL<=0.005 MIU/L-ACNC: 1.46 UIU/ML (ref 0.3–4.2)

## 2024-05-21 PROCEDURE — 83036 HEMOGLOBIN GLYCOSYLATED A1C: CPT

## 2024-05-21 PROCEDURE — 84443 ASSAY THYROID STIM HORMONE: CPT

## 2024-05-21 PROCEDURE — 36415 COLL VENOUS BLD VENIPUNCTURE: CPT

## 2024-05-21 PROCEDURE — 80048 BASIC METABOLIC PNL TOTAL CA: CPT

## 2024-05-23 DIAGNOSIS — E03.9 HYPOTHYROIDISM, UNSPECIFIED TYPE: ICD-10-CM

## 2024-05-24 RX ORDER — LEVOTHYROXINE SODIUM 100 UG/1
100 TABLET ORAL DAILY
Qty: 90 TABLET | Refills: 2 | OUTPATIENT
Start: 2024-05-24

## 2024-06-04 RX ORDER — LEVOTHYROXINE SODIUM 100 UG/1
100 TABLET ORAL DAILY
Qty: 90 TABLET | Refills: 1 | Status: SHIPPED | OUTPATIENT
Start: 2024-06-04

## 2024-06-06 ENCOUNTER — MYC MEDICAL ADVICE (OUTPATIENT)
Dept: ENDOCRINOLOGY | Facility: CLINIC | Age: 34
End: 2024-06-06
Payer: COMMERCIAL

## 2024-06-06 ENCOUNTER — MYC REFILL (OUTPATIENT)
Dept: ENDOCRINOLOGY | Facility: CLINIC | Age: 34
End: 2024-06-06
Payer: COMMERCIAL

## 2024-06-06 DIAGNOSIS — E03.9 HYPOTHYROIDISM, UNSPECIFIED TYPE: ICD-10-CM

## 2024-06-06 RX ORDER — LEVOTHYROXINE SODIUM 100 UG/1
100 TABLET ORAL DAILY
Qty: 90 TABLET | Refills: 1 | OUTPATIENT
Start: 2024-06-06

## 2024-06-25 NOTE — PROGRESS NOTES
Discharge Note -Physical Therapy    NAME:  Denise Cazares  MRN:   [id number]       Pt did not follow up for therapy as recommended. No further contacts have been made. Last objective information or progress note will serve as final entry. Discharge from PT this date.      [facility]  Kris Hoenk  PT  [facility]  [address] [place] [address]  [email or web address]   Office: [phone]  Voicemail: [phone]

## 2024-09-17 ENCOUNTER — IMMUNIZATION (OUTPATIENT)
Dept: FAMILY MEDICINE | Facility: CLINIC | Age: 34
End: 2024-09-17
Payer: COMMERCIAL

## 2024-09-17 PROCEDURE — 90656 IIV3 VACC NO PRSV 0.5 ML IM: CPT

## 2024-09-17 PROCEDURE — 90471 IMMUNIZATION ADMIN: CPT

## 2024-10-01 ENCOUNTER — PATIENT OUTREACH (OUTPATIENT)
Dept: CARE COORDINATION | Facility: CLINIC | Age: 34
End: 2024-10-01
Payer: COMMERCIAL

## 2024-10-15 ENCOUNTER — PATIENT OUTREACH (OUTPATIENT)
Dept: CARE COORDINATION | Facility: CLINIC | Age: 34
End: 2024-10-15
Payer: COMMERCIAL

## 2024-12-01 ENCOUNTER — HEALTH MAINTENANCE LETTER (OUTPATIENT)
Age: 34
End: 2024-12-01

## 2024-12-12 ENCOUNTER — E-VISIT (OUTPATIENT)
Dept: URGENT CARE | Facility: CLINIC | Age: 34
End: 2024-12-12
Payer: COMMERCIAL

## 2024-12-12 ENCOUNTER — LAB (OUTPATIENT)
Dept: FAMILY MEDICINE | Facility: CLINIC | Age: 34
End: 2024-12-12
Attending: PHYSICIAN ASSISTANT
Payer: COMMERCIAL

## 2024-12-12 DIAGNOSIS — J02.9 SORE THROAT: ICD-10-CM

## 2024-12-12 DIAGNOSIS — Z20.818 PERTUSSIS EXPOSURE: Primary | ICD-10-CM

## 2024-12-12 DIAGNOSIS — Z20.818 EXPOSURE TO PERTUSSIS: Primary | ICD-10-CM

## 2024-12-12 DIAGNOSIS — Z20.818 EXPOSURE TO PERTUSSIS: ICD-10-CM

## 2024-12-12 LAB
DEPRECATED S PYO AG THROAT QL EIA: NEGATIVE
GROUP A STREP BY PCR: NOT DETECTED

## 2024-12-12 RX ORDER — AZITHROMYCIN 250 MG/1
TABLET, FILM COATED ORAL
Qty: 6 TABLET | Refills: 0 | Status: SHIPPED | OUTPATIENT
Start: 2024-12-12 | End: 2024-12-17

## 2024-12-12 NOTE — PATIENT INSTRUCTIONS
Dear Denise,    After reviewing your responses, I would like you to come in for a swab to make sure we treat you correctly. This swab is to evaluate you for possible Strep Throat, and should be scheduled for today or tomorrow. Please use the Schedule Now button in [employer] to schedule your swab. Otherwise, click this link to schedule a lab only appointment.    Lab appointments are not available at most locations on the weekends. If no Lab Only appointment is available, you should be seen in any of our convenient Urgent Care Centers for an in person visit, which can be found on our website here.    You will receive instructions with your results in [employer] once they are available.     If your symptoms worsen, you develop difficulty breathing, difficulty with drinking enough to stay hydrated, difficulty swallowing your saliva or have fevers for more than 5 days, please contact your primary care provider for an appointment or visit an Urgent Care Center to be seen.      Thanks again for choosing us as your health care partner.   Johanna Anglni PA-C  Sore Throat: Care Instructions  Overview     Infection by bacteria or a virus causes most sore throats. Cigarette smoke, dry air, air pollution, allergies, and yelling can also cause a sore throat. Sore throats can be painful and annoying. Fortunately, most sore throats go away on their own. If you have a bacterial infection, your doctor may prescribe antibiotics.  Follow-up care is a key part of your treatment and safety. Be sure to make and go to all appointments, and call your doctor if you are having problems. It's also a good idea to know your test results and keep a list of the medicines you take.  How can you care for yourself at home?  If your doctor prescribed antibiotics, take them as directed. Do not stop taking them just because you feel better. You need to take the full course of antibiotics.  Gargle with warm salt water several times a day to help reduce swelling  "and relieve pain. Mix 1/2 teaspoon of salt in 1 cup of warm water.  Take an over-the-counter pain medicine, such as acetaminophen (Tylenol), ibuprofen (Advil, Motrin), or naproxen (Aleve). Read and follow all instructions on the label.  Be careful when taking over-the-counter cold or flu medicines and Tylenol at the same time. Many of these medicines have acetaminophen, which is Tylenol. Read the labels to make sure that you are not taking more than the recommended dose. Too much acetaminophen (Tylenol) can be harmful.  Drink plenty of fluids. Fluids may help soothe an irritated throat. Hot fluids, such as tea or soup, may help decrease throat pain.  Use over-the-counter throat lozenges to soothe pain. Regular cough drops or hard candy may also help. These should not be given to young children because of the risk of choking.  Do not smoke or allow others to smoke around you. If you need help quitting, talk to your doctor about stop-smoking programs and medicines. These can increase your chances of quitting for good.  Use a vaporizer or humidifier to add moisture to your bedroom. Follow the directions for cleaning the machine.  When should you call for help?   Call your doctor now or seek immediate medical care if:    You have trouble breathing.     Your sore throat gets much worse on one side.     You have new or worse trouble swallowing.     You have a new or higher fever.   Watch closely for changes in your health, and be sure to contact your doctor if you do not get better as expected.  Where can you learn more?  Go to https://www.[employer].net/patiented  Enter U420 in the search box to learn more about \"Sore Throat: Care Instructions.\"  Current as of: September 27, 2023  Content Version: 14.2 2024 [employer] [facility] [employer].   Care instructions adapted under license by your healthcare professional. If you have questions about a medical condition or this instruction, always ask your healthcare professional. " [employer] disclaims any warranty or liability for your use of this information.

## 2024-12-18 ENCOUNTER — OFFICE VISIT (OUTPATIENT)
Dept: OBGYN | Facility: CLINIC | Age: 34
End: 2024-12-18
Payer: COMMERCIAL

## 2024-12-18 VITALS
TEMPERATURE: 98 F | HEART RATE: 76 BPM | DIASTOLIC BLOOD PRESSURE: 71 MMHG | RESPIRATION RATE: 18 BRPM | WEIGHT: 215 LBS | SYSTOLIC BLOOD PRESSURE: 124 MMHG | BODY MASS INDEX: 42.21 KG/M2 | HEIGHT: 60 IN

## 2024-12-18 DIAGNOSIS — Z30.46 SURVEILLANCE OF PREVIOUSLY PRESCRIBED IMPLANTABLE SUBDERMAL CONTRACEPTIVE: ICD-10-CM

## 2024-12-18 DIAGNOSIS — Z30.9 ENCOUNTER FOR CONTRACEPTIVE MANAGEMENT, UNSPECIFIED TYPE: Primary | ICD-10-CM

## 2024-12-18 DIAGNOSIS — Z30.017 NEXPLANON INSERTION: ICD-10-CM

## 2024-12-18 LAB
HCG UR QL: NEGATIVE
INTERNAL QC OK POCT: NORMAL
POCT KIT EXPIRATION DATE: NORMAL
POCT KIT LOT NUMBER: NORMAL

## 2024-12-18 PROCEDURE — 81025 URINE PREGNANCY TEST: CPT | Performed by: PHYSICIAN ASSISTANT

## 2024-12-18 NOTE — PROGRESS NOTES
"NEXPLANON REMOVAL/REINSERTION:    Is a pregnancy test required: Yes.  Was it positive or negative?  Negative  Was a consent obtained?  Yes    Subjective: Denise Cazares is a 34 year old  presents for Nexplanon removal and replacement.     Patient has been given the opportunity to ask questions about all forms of birth control, including all options appropriate for Denise Cazares. Discussed that no method of birth control, except abstinence is 100% effective against pregnancy or sexually transmitted infection.     Denise Cazares understands planning removal and reinsertion today    The entire removal and insertion procedure was reviewed with the patient, including care after placement.      /71 (BP Location: Right arm, Patient Position: Chair, Cuff Size: Adult Regular)   Pulse 76   Temp 98  F (36.7  C) (Tympanic)   Resp 18   Ht 1.518 m (4' 11.75\")   Wt 97.5 kg (215 lb)   BMI 42.34 kg/m      PROCEDURE NOTE: -- Nexplanon Removal/Insertion    Technique: On the left arm  Skin prep Betadine  Anesthesia 1% lidocaine  Procedure: Patient was placed supine with left arm exposed.  Implant located by palpitation. Alyson was made 8-10 cm above medial epicondyle and a guiding alyson 4 cm above the first.  Arm was prepped with Betadine. Incision point was anesthetized with 4 mL 1% lidocaine.     Small incision (<5mm) was made at distal end of palpable implant, curved hemostat or mosquito forceps was used to isolate the implant and bring it to the incision, the fibrous capsule containing the implant  was incised and the implant was removed intact.    After stretching the skin with thumb and index finger around the insertion site, skin punctured with the tip of the needle inserted at 30 degrees and then lowered to horizontal position. While lifting the skin with the tip of the needle, inserted the needle to its full length. Applicator was then stabilized and the slider was unlocked by pushing it slightly " down. Slider moved back completely until it stopped. Applicator was then removed.    Correct placement of the implant was confirmed by palpation in the patient's arm and visualizing the purple top of the obturator.   Bandage and pressure dressing applied to insertion site.    EBL: minimal    Complications: none    ASSESSMENT:     ICD-10-CM    1. Encounter for contraceptive management, unspecified type  Z30.9 HCG qualitative urine POCT     etonogestrel (NEXPLANON) subdermal implant 68 mg     REMOVAL AND REINSERTION NEXPLANON      2. Surveillance of previously prescribed implantable subdermal contraceptive  Z30.46 etonogestrel (NEXPLANON) subdermal implant 68 mg     REMOVAL AND REINSERTION NEXPLANON             PLAN:    Given 's handouts, including when to have Nexplanon removed, list of danger s/sx, side effects and follow up recommended. Encouraged condom use for prevention of STD. Back up contraception advised for 7 days. Advised to call for any fever, for prolonged or severe pain or bleeding, abnormal vaginal dischage. She was advised to use pain medications (ibuprofen) as needed for mild to moderate pain.     Jesi Arceo PA-C

## 2024-12-18 NOTE — NURSING NOTE
"Initial /71 (BP Location: Right arm, Patient Position: Chair, Cuff Size: Adult Regular)   Pulse 76   Temp 98  F (36.7  C) (Tympanic)   Resp 18   Ht 1.518 m (4' 11.75\")   Wt 97.5 kg (215 lb)   BMI 42.34 kg/m   Estimated body mass index is 42.34 kg/m  as calculated from the following:    Height as of this encounter: 1.518 m (4' 11.75\").    Weight as of this encounter: 97.5 kg (215 lb). .    "

## 2025-01-13 ENCOUNTER — VIRTUAL VISIT (OUTPATIENT)
Dept: ENDOCRINOLOGY | Facility: CLINIC | Age: 35
End: 2025-01-13
Payer: COMMERCIAL

## 2025-01-13 DIAGNOSIS — E55.9 VITAMIN D DEFICIENCY: Primary | ICD-10-CM

## 2025-01-13 DIAGNOSIS — E03.9 HYPOTHYROIDISM, UNSPECIFIED TYPE: ICD-10-CM

## 2025-01-13 PROCEDURE — 98006 SYNCH AUDIO-VIDEO EST MOD 30: CPT | Performed by: INTERNAL MEDICINE

## 2025-01-13 RX ORDER — LEVOTHYROXINE SODIUM 100 UG/1
100 TABLET ORAL DAILY
Qty: 90 TABLET | Refills: 3 | Status: SHIPPED | OUTPATIENT
Start: 2025-01-13

## 2025-01-13 NOTE — NURSING NOTE
Current patient location:  [place]    Is the patient currently in the state of [place]? YES    Visit mode: VIDEO    If the visit is dropped, the patient can be reconnected by:VIDEO VISIT: Text to cell phone:   Telephone Information:   Mobile [phone]       Will anyone else be joining the visit? NO  (If patient encounters technical issues they should call [phone]    Are changes needed to the allergy or medication list? No    Are refills needed on medications prescribed by this physician? NO    Rooming Documentation:  Questionnaire(s) completed    Reason for visit: Video Visit and RECHECK    Chichi VANCE

## 2025-01-13 NOTE — PROGRESS NOTES
ENDOCRINOLOGY VIDEO FOLLOW-UP      HISTORY OF PRESENT ILLNESS    Denise Cazares is seen in follow-up via a billable video visit.     I last saw patient in 10/2023.  We have adjusted levothyroxine regimen in the interim since last visit.    She had been taking levothyroxine 100 mcg daily: She got a pillbox and had been taking levothyroxine consistently every day.  However, she ran out of the medication about 2 weeks ago and did not have a chance to request refill since she was so busy with the holidays.    She does not feel significantly fatigued off levothyroxine.    She had Nexplanon removed and reinserted on 2024.  No consistent menstrual bleeding on Nexplanon.  No plans for conception in the future: Her  has had a vasectomy.    She recalls prior testing for menorrhagia revealing elevated testosterone: This was in the distant past and may have been in the [facility] system.  She has not noted any significant facial hair growth or acne.    Does not take vitamin D supplement.  Tries to take multivitamin  by 4 hours from levothyroxine.    Pertinent endocrine and related history:  1.  Hypothyroidism.  Diagnosed in  during pregnancy, attributed to Hashimoto's thyroiditis.  Has been on and off levothyroxine she since then, with interruption in medication due to insurance loss.  - (1 pregnancy ended with blighted ovum).  Has Nexplanon in place and is not having any menstrual bleeding on Nexplanon.  Does not plan to conceive in the future.    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  Youngest daughter has history of leukemia.  Lives in [place].      PAST MEDICAL HISTORY  Past Medical History:   Diagnosis Date    Anemia     Chickenpox     Endometrial polyp     Hypothyroidism        MEDICATIONS  Current Outpatient Medications   Medication Sig Dispense Refill    etonogestrel (NEXPLANON) 68 MG IMPL 1 each (68 mg) by  Subdermal route once.      etonogestrel (NEXPLANON) 68 MG IMPL 1 each (68 mg) by Subdermal route once      lactobacillus rhamnosus, GG, (CULTURELL) capsule Take 1 capsule by mouth daily      levothyroxine (SYNTHROID/LEVOTHROID) 100 MCG tablet Take 1 tablet (100 mcg) by mouth daily Please follow up with Primary Care for future refills. 90 tablet 1    Multiple Vitamins-Minerals (MULTIVITAMIN ADULTS PO) Take 1 tablet by mouth daily         Allergies, family, and social history were reviewed and documented as needed in EHR.     REVIEW OF SYSTEMS  A focused ROS was performed, with pertinent positives and negatives as noted in the HPI.    PHYSICAL EXAM  There were no vitals taken for this visit.  There is no height or weight on file to calculate BMI.  Constitutional: Patient is alert, oriented and appears in no acute distress.  Eyes: Eyes grossly normal to inspection, EOMI, no stare, lid lag, or retraction; no conjunctival injection.  ENMT: Lips are without lesions.   Neck: No visible goiter or neck mass.  Respiratory: No audible wheeze or cough. No visible cyanosis. No visible increased work of breathing.  Neurological: Alert and oriented times 3.  Cranial nerves grossly intact.      DATA REVIEW  Each of the following laboratory and/or imaging studies were reviewed.        ASSESSMENT  1.  Hypothyroidism.  No overt hypothyroid symptoms, although she has been out of levothyroxine for the past 2 weeks.  Would resume levothyroxine at previous dose and recheck thyroid function tests in 2 months.  Patient has questions regarding Hashimoto's thyroiditis and thyroid hormone regimens: We reviewed pathophysiology of Hashimoto's, approach to its treatment, as well as benefits and drawbacks of various thyroid hormone regimens.  In the long-term, if she is optimally replaced with levothyroxine and still has persistent fatigue we could consider addition of physiologic dose of T3.    2.  High risk for diabetes.  Recheck hemoglobin A1c  with next lab draw.    3.  Vitamin D deficiency.  Noted on primary care labs in 12/2023.  Recommend starting supplement as below.  We will check vitamin D with next lab draw.    4.  Distant history of elevated testosterone.  Reported by patient, I do not see prior testosterone levels in our system or Care Everywhere.  No clinical symptoms of androgen excess.  We will check testosterone with next lab draw.    PLAN  -Resume levothyroxine 100 mcg daily: Updated prescription sent to pharmacy  -As before, it is fine to take a catch-up dose the next day if prior day's dose is missed  -Start vitamin D3: Can take either 2000 IU once daily or 14,000 IU once weekly (take with food to absorb best)  -Labs in 2 months  -Return for a follow-up visit next available, in person or virtual  -We will communicate results via [employer], or if needed by phone    Orders Placed This Encounter   Procedures    Testosterone total    Hemoglobin A1c    TSH with free T4 reflex    Vitamin D Deficiency         Video-Visit Details    Type of service:  Video Visit    Video Start Time: 12:14 PM  Video End Time: 12:32 PM    Physician location: Off site    Platform used for Video Visit: [place]    I spent a total of 30 minutes on the date of encounter reviewing medical records, evaluating the patient, coordinating care and documenting in the EHR, as detailed above.      Lalitha Gaviria MD   Division of Diabetes, Endocrinology and Metabolism  Department of Medicine

## 2025-01-13 NOTE — PATIENT INSTRUCTIONS
-Resume levothyroxine 100 mcg daily: Updated prescription sent to pharmacy  -As before, it is fine to take a catch-up dose the next day if prior day's dose is missed  -Start vitamin D3: Can take either 2000 IU once daily or 14,000 IU once weekly (take with food to absorb best)  -Labs in 2 months  -Return for a follow-up visit next available, in person or virtual  -We will communicate results via [employer], or if needed by phone

## 2025-01-13 NOTE — LETTER
2025       RE: Denise Cazares  [address] [facility] [address]     Dear Colleague,    Thank you for referring your patient, Denise Cazares, to the [facility] [place] at [facility]. Please see a copy of my visit note below.      ENDOCRINOLOGY VIDEO FOLLOW-UP      HISTORY OF PRESENT ILLNESS    Denise Cazares is seen in follow-up via a billable video visit.     I last saw patient in 10/2023.  We have adjusted levothyroxine regimen in the interim since last visit.    She had been taking levothyroxine 100 mcg daily: She got a pillbox and had been taking levothyroxine consistently every day.  However, she ran out of the medication about 2 weeks ago and did not have a chance to request refill since she was so busy with the holidays.    She does not feel significantly fatigued off levothyroxine.    She had Nexplanon removed and reinserted on 2024.  No consistent menstrual bleeding on Nexplanon.  No plans for conception in the future: Her  has had a vasectomy.    She recalls prior testing for menorrhagia revealing elevated testosterone: This was in the distant past and may have been in the [facility] system.  She has not noted any significant facial hair growth or acne.    Does not take vitamin D supplement.  Tries to take multivitamin  by 4 hours from levothyroxine.    Pertinent endocrine and related history:  1.  Hypothyroidism.  Diagnosed in  during pregnancy, attributed to Hashimoto's thyroiditis.  Has been on and off levothyroxine she since then, with interruption in medication due to insurance loss.  - (1 pregnancy ended with blighted ovum).  Has Nexplanon in place and is not having any menstrual bleeding on Nexplanon.  Does not plan to conceive in the future.    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  Youngest  daughter has history of leukemia.  Lives in [place].      PAST MEDICAL HISTORY  Past Medical History:   Diagnosis Date     Anemia      Chickenpox      Endometrial polyp      Hypothyroidism        MEDICATIONS  Current Outpatient Medications   Medication Sig Dispense Refill     etonogestrel (NEXPLANON) 68 MG IMPL 1 each (68 mg) by Subdermal route once.       etonogestrel (NEXPLANON) 68 MG IMPL 1 each (68 mg) by Subdermal route once       lactobacillus rhamnosus, GG, (CULTURELL) capsule Take 1 capsule by mouth daily       levothyroxine (SYNTHROID/LEVOTHROID) 100 MCG tablet Take 1 tablet (100 mcg) by mouth daily Please follow up with Primary Care for future refills. 90 tablet 1     Multiple Vitamins-Minerals (MULTIVITAMIN ADULTS PO) Take 1 tablet by mouth daily         Allergies, family, and social history were reviewed and documented as needed in EHR.     REVIEW OF SYSTEMS  A focused ROS was performed, with pertinent positives and negatives as noted in the HPI.    PHYSICAL EXAM  There were no vitals taken for this visit.  There is no height or weight on file to calculate BMI.  Constitutional: Patient is alert, oriented and appears in no acute distress.  Eyes: Eyes grossly normal to inspection, EOMI, no stare, lid lag, or retraction; no conjunctival injection.  ENMT: Lips are without lesions.   Neck: No visible goiter or neck mass.  Respiratory: No audible wheeze or cough. No visible cyanosis. No visible increased work of breathing.  Neurological: Alert and oriented times 3.  Cranial nerves grossly intact.      DATA REVIEW  Each of the following laboratory and/or imaging studies were reviewed.        ASSESSMENT  1.  Hypothyroidism.  No overt hypothyroid symptoms, although she has been out of levothyroxine for the past 2 weeks.  Would resume levothyroxine at previous dose and recheck thyroid function tests in 2 months.  Patient has questions regarding Hashimoto's thyroiditis and thyroid hormone regimens: We reviewed  pathophysiology of Hashimoto's, approach to its treatment, as well as benefits and drawbacks of various thyroid hormone regimens.  In the long-term, if she is optimally replaced with levothyroxine and still has persistent fatigue we could consider addition of physiologic dose of T3.    2.  High risk for diabetes.  Recheck hemoglobin A1c with next lab draw.    3.  Vitamin D deficiency.  Noted on primary care labs in 12/2023.  Recommend starting supplement as below.  We will check vitamin D with next lab draw.    4.  Distant history of elevated testosterone.  Reported by patient, I do not see prior testosterone levels in our system or Care Everywhere.  No clinical symptoms of androgen excess.  We will check testosterone with next lab draw.    PLAN  -Resume levothyroxine 100 mcg daily: Updated prescription sent to pharmacy  -As before, it is fine to take a catch-up dose the next day if prior day's dose is missed  -Start vitamin D3: Can take either 2000 IU once daily or 14,000 IU once weekly (take with food to absorb best)  -Labs in 2 months  -Return for a follow-up visit next available, in person or virtual  -We will communicate results via [employer] or if needed by phone    Orders Placed This Encounter   Procedures     Testosterone total     Hemoglobin A1c     TSH with free T4 reflex     Vitamin D Deficiency         Video-Visit Details    Type of service:  Video Visit    Video Start Time: 12:14 PM  Video End Time: 12:32 PM    Physician location: Off site    Platform used for Video Visit: [place]    I spent a total of 30 minutes on the date of encounter reviewing medical records, evaluating the patient, coordinating care and documenting in the EHR, as detailed above.      Lalitha Gaviria MD   Division of Diabetes, Endocrinology and Metabolism  Department of Medicine        Again, thank you for allowing me to participate in the care of your patient.      Sincerely,    Lalitha Gaviria MD

## 2025-03-13 ENCOUNTER — LAB (OUTPATIENT)
Dept: LAB | Facility: CLINIC | Age: 35
End: 2025-03-13
Payer: COMMERCIAL

## 2025-03-13 DIAGNOSIS — E55.9 VITAMIN D DEFICIENCY: ICD-10-CM

## 2025-03-13 DIAGNOSIS — E03.9 HYPOTHYROIDISM, UNSPECIFIED TYPE: ICD-10-CM

## 2025-03-13 LAB
EST. AVERAGE GLUCOSE BLD GHB EST-MCNC: 111 MG/DL
HBA1C MFR BLD: 5.5 % (ref 0–5.6)
TSH SERPL DL<=0.005 MIU/L-ACNC: 0.92 UIU/ML (ref 0.3–4.2)

## 2025-03-19 LAB — TESTOST SERPL-MCNC: 32 NG/DL (ref 8–60)

## 2025-03-31 ENCOUNTER — MYC MEDICAL ADVICE (OUTPATIENT)
Dept: ENDOCRINOLOGY | Facility: CLINIC | Age: 35
End: 2025-03-31
Payer: COMMERCIAL

## 2025-03-31 DIAGNOSIS — E03.9 HYPOTHYROIDISM, UNSPECIFIED TYPE: Primary | ICD-10-CM

## 2025-03-31 DIAGNOSIS — E55.9 VITAMIN D DEFICIENCY: ICD-10-CM

## 2025-04-01 NOTE — PROGRESS NOTES
Rheumatology Clinic Visit  [facility]  RAOUL Kent     Denise Cazares MRN# [id number]   YOB: 1990 Age: 35 year old   Date of Visit: 4/8/2025  [facility]          Assessment and Plan:     1.  Inflammatory arthritis  2.  Positive YOSELIN  3. High risk medication use    Patient presents today for follow-up.  She is starting to notice some increased joint pain.  She is no longer breast-feeding and feels more comfortable starting on immunomodulatory medication for her inflammatory arthritis.  At this time we will restart the hydroxychloroquine as it did provide benefit for her in the past.  Will keep it at a low dose of 1/day and slowly increase as tolerated.  In the past she had reported diarrhea at taking 2/day so we will not go right up to that dose.  Will check baseline labs today and again in 1 month.  She will follow-up with me in 3 months, sooner if needed.      The longitudinal plan of care for the diagnosis(es)/condition(s) as documented were addressed during this visit. Due to the added complexity in care, I will continue to support Denise in the subsequent management and with ongoing continuity of care.    Plan:   Schedule follow-up with Bre Chanel PA-C in 3 months.   Labs: CBC, creatinine, albumin, AST, ALT, CRP, Sed rate today, in 1 month and then every 3 months; RNP in 1 month  Medication recommendations:   Hydroxychloroquine 200mg: Take 1 tab daily  -While on hydroxychloroquine it is important that you get an eye exam annually (visual field and OCT). Make sure to let your eye provider know that you are taking this medication.   -Labs (AST/ALT, creatinine, CBC with platelets) should be monitored every 3-6 months  # Hydroxychloroquine (Plaquenil) Risks and Benefits:  The risks and benefits of hydroxychloroquine were discussed in detail and the patient verbalized understanding; the patient also verbalized agreement to get the required  "ophthalmologic toxicity monitoring.  The risks discussed include, but are not limited to, the risk for hypersensitivity, anaphylaxis, anaphylactoid reactions, irreversible retinal damage, rare hematologic reactions, and rare cardiomyopathy.  Patients with G6PD deficiency or hepatic impairment may be at an increased risk for adverse effects.  I encouraged reviewing the package insert and asking any questions about the medication.       RAOUL Kent  Rheumatology         History of Present Illness:   Denise Cazares presents for evaluation of polyarthralgia.     Rheumatological history:  Previous medications tried: Hydroxychloroquine (diarrhea at 2/day)  Current medications: none     Interval history April 8, 2025:  She is getting more hand pain and knee pain. Her fingers may go numb with eating.     Interval history April 5, 2024:  She saw her PCP as she was having episodes of numbness in her hands. She had EMG and it showed carpal tunnel. She had a little knee pain this morning, but otherwise no joint pain. She has been having some shoulder pain. She has had this for \"years\". She has had imaging done that showed issues with her tendon or ligament. She cannot sleep on her right side due to the pain.If she does fall asleep she will wake up and the shoulder will be aching.     Interval history December 1, 2023:  She has been off the Hydroxychloroquine for about 3 months. She feels that she has been doing well enough to not need it. She tried taking 2/day but had diarrhea. She has noticed with the colder weather, increased knee pain. She has some aching in her hands with washing dishes with cold water. She has not had any swelling. She talked with her PCP regarding her liver function tests. US showed fatty liver. They discussed lifestyle changes. She has started seeing therapy to help work through everything with her daughter.       Interval history May 30, 2023:  She still has some pain in the left knee. At " "times she has a hard time walking, but overall she feels it is bearable. She does feel the Hydroxychloroquine is helping. She did have a week where she did not take it and had more pain. No skin rashes or GI upset. She did have some GI upset at the beginning, but she feels this has leveled off.     Interval history November 18, 2022:  She states that she spoke with her orthopedic provider and it did not think that anything needed to be done surgically.     She states that she has a hard time going up and down the stairs. She feels that her knee \"alexandru\". She woke up this morning with discomfort in her left knee. No skin rashes. No dry eyes or dry mouth. She has had some post-partum hair loss.     She states that her wrists have been okay. When she lays down and then goes to get up, she has pain in her heel, so she does limp until it resolves. She reports her  saying that she walks \"weird\". No Raynaud's. No shortness of breath, chest pain or pleurisy. No recent infections. No fevers.    HPI from Consult 07/25/2022:  She states that in April, she started to have pain in her left sciatic nerve. She then started to have pain on the top of her left foot by the ankle. She has a daughter with leukemia and was at the hospital. She then started to have her left knee swell. She had previously had swelling in ehr right knee with fluid removed. She got the liquid removed on the left. 1 week later, she needed it removed again. She then started to have pain in her wrists. She had a positive lyme and was given amoxicillin. She saw infectious disease but was told they were uncertain if she had lyme. 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 She had further blood showing antibodies for lyme. She feels that both of her knees are swollen and the bottom of her heel. She has pain when she first stands up on the heel and that resolves after walking a few steps. She states that it is hard to bend her knees in the morning. " "She also has hypermobile joints as well and there is question on if this is causing \"wear and tear\". She has seen orthopedics and PT was recommended. She is able to walk better since completing the antibiotic, but she continues to feel that there is a lot of swelling in her joints. She feels that she is worse in the mornings after periods of rest. No skin rashes. She has some fatigue, but she feels this is related to all the stress with her daughter.     Her mother complains about pain in her hands but is unsure if there is RA. No family history of lupus. No personal or family history of psoriasis, ulcerative colitis or crohn's.          Review of Systems:     Constitutional: negative  Skin: negative  Eyes: negative  Ears/Nose/Throat: negative  Respiratory: No shortness of breath, dyspnea on exertion, cough, or hemoptysis  Cardiovascular: negative  Gastrointestinal: negative  Genitourinary: negative  Musculoskeletal: as above  Neurologic: negative  Psychiatric: negative  Hematologic/Lymphatic/Immunologic: negative  Endocrine: negative         Active Problem List:     Patient Active Problem List    Diagnosis Date Noted    Nexplanon insertion 12/18/2024     Priority: Medium     Nexplanon placement 12/18/24 by Jesi Arceo  NDC 53861-443-11  Lot [id number]  Expiration 09/2026      Chronic right shoulder pain 05/14/2024     Priority: Medium    Nexplanon in place 12/14/2021 LT Arm 12/14/2021     Priority: Medium     Nexplanon placed 12/14/2021  LOT# [id number]  Exp: 04/09/2024  NDC# 87949-210-90    Dinora Morrison on 12/14/2021 at 9:53 AM          Class 2 obesity in adult 10/12/2021     Priority: Medium    Pain in both knees, unspecified chronicity 02/26/2021     Priority: Medium    Hypothyroidism      Priority: Medium            Past Medical History:     Past Medical History:   Diagnosis Date    Anemia     Chickenpox     Endometrial polyp     Hypothyroidism      Past Surgical History:   Procedure Laterality " Date    AS HYSTEROSCOPY W ENDOMETRIAL BX/POLYPECTOMY W/WO D&C      LAPAROSCOPIC CHOLECYSTECTOMY      TONSILLECTOMY              Social History:     Social History     Socioeconomic History    Marital status:      Spouse name: Not on file    Number of children: Not on file    Years of education: Not on file    Highest education level: Not on file   Occupational History    Not on file   Tobacco Use    Smoking status: Never    Smokeless tobacco: Never   Vaping Use    Vaping status: Never Used   Substance and Sexual Activity    Alcohol use: Not Currently     Comment: occasionally-quit with pregnnacy    Drug use: Never    Sexual activity: Yes     Partners: Male     Birth control/protection: None     Comment: Dominick   Other Topics Concern    Not on file   Social History Narrative    Not on file     Social Drivers of Health     Financial Resource Strain: Low Risk  (12/12/2024)    Financial Resource Strain     Within the past 12 months, have you or your family members you live with been unable to get utilities (heat, electricity) when it was really needed?: No   Food Insecurity: Low Risk  (12/12/2024)    Food Insecurity     Within the past 12 months, did you worry that your food would run out before you got money to buy more?: No     Within the past 12 months, did the food you bought just not last and you didn t have money to get more?: No   Transportation Needs: Low Risk  (12/12/2024)    Transportation Needs     Within the past 12 months, has lack of transportation kept you from medical appointments, getting your medicines, non-medical meetings or appointments, work, or from getting things that you need?: No   Physical Activity: Not on file   Stress: Not on file   Social Connections: Not on file   Interpersonal Safety: Low Risk  (10/31/2023)    Interpersonal Safety     Do you feel physically and emotionally safe where you currently live?: Yes     Within the past 12 months, have you been hit, slapped, kicked or  "otherwise physically hurt by someone?: No     Within the past 12 months, have you been humiliated or emotionally abused in other ways by your partner or ex-partner?: No   Housing Stability: Low Risk  (12/12/2024)    Housing Stability     Do you have housing? : Yes     Are you worried about losing your housing?: No          Family History:     Family History   Problem Relation Age of Onset    Diabetes Mother     Hypertension Mother     No Known Problems Father     Other - See Comments Maternal Grandfather         MVA            Allergies:   No Known Allergies         Medications:     Current Outpatient Medications   Medication Sig Dispense Refill    etonogestrel (NEXPLANON) 68 MG IMPL 1 each (68 mg) by Subdermal route once      hydroxychloroquine (PLAQUENIL) 200 MG tablet Take 1 tablet (200 mg) by mouth daily. 90 tablet 0    lactobacillus rhamnosus, GG, (CULTURELL) capsule Take 1 capsule by mouth daily      levothyroxine (SYNTHROID/LEVOTHROID) 100 MCG tablet Take 1 tablet (100 mcg) by mouth daily. Please follow up with Primary Care for future refills. 90 tablet 3    Multiple Vitamins-Minerals (MULTIVITAMIN ADULTS PO) Take 1 tablet by mouth daily              Physical Exam:   Blood pressure 117/76, pulse 63, temperature 97.1  F (36.2  C), temperature source Tympanic, resp. rate 18, height 1.518 m (4' 11.75\"), weight 95.8 kg (211 lb 3.2 oz), SpO2 97%, not currently breastfeeding.  Wt Readings from Last 6 Encounters:   04/08/25 95.8 kg (211 lb 3.2 oz)   12/18/24 97.5 kg (215 lb)   04/05/24 98 kg (216 lb)   03/25/24 98.4 kg (217 lb)   01/18/24 95.7 kg (211 lb)   12/01/23 97.5 kg (215 lb)     Constitutional: well-developed, appearing stated age; cooperative  Eyes: nl  conjunctiva, sclera  ENT: nl external ears, nose, hearing, lips,   Resp: No shortness of breath with normal conversation  MSK: No active synovitis or dactylitis.  Full fist formation.  No knee effusions.    Psych: nl judgement, orientation, memory, " affect.           Data:   Imaging:  MRI left knee 5/31/2022  Impression:   1. Nodular intra-articular foci as detailed above. Differential would   include infectious or inflammatory etiology. Nodular synovitis and/or   PVNS are also included in the differential. Consider referral to   orthopedic oncology at the [facility].     2. Repeat MRI with gadolinium and susceptibility weighted imaging   could be considered in 3-6 months.     Xray left foot 5/3/2022  IMPRESSION: No fractures are evident. Normal joint spacing and  alignment. The soft tissues are unremarkable.     MRI right knee 8/14/22  Impression: Large knee joint effusion with area of mild synovitis.  Given contralateral knee with possible synovitis finding, this may be  systemic condition. Especially with positive lyme antibodies,  differential consideration include lyme arthritis. No evidence of  erosion, reactive osteitis.   Ultrasound of her abdomen on 11/21/2023 shows hepatic steatosis and prior cholecystectomy    XR SHOULDER RIGHT 2 VIEWS  DATE/TIME: 4/5/2024 10:38 AM                                                            IMPRESSION: Normal joint spaces and alignment. No definite fracture.     Laboratory:  5/11/2022  Uric acid 2.1  Rheumatoid factor 7  YOSELIN negative    5/18/2022  CCP antibody 2.6  CBC within normal parameters    6/24/2022  TSH 3.9, T4 0.76    7/15/2022  Creatinine 0.56, GFR greater than 80  ALT 32, AST 20  CRP 6.3  Vitamin D 18  Sed rate 22  LYme confirm IgG reactive, 9.0      Fluid analysis: 5/16/2022: 6K WBC, 38% PMNs, 36% lymphocytes. Cultures on broth showed likely contaminate of diptheroids, globicatella sanuinis. Lyme analysis showed reactive IgG    8/11/2022   CRP less than 2.9  Rheumatoid factor 8  Sed rate 15  YOSELIN borderline positive with a speckled pattern and a titer of 1: 80    12/1/2022  Creatinine 0.49, GFR greater than 90  Albumin 3.9  ALT 92, AST 30  Syndrome antibody negative  CRP 3.7  CBC within normal  limits  Sed rate 10  C3 141, C4 30  Double-stranded DNA negative  RNP equivocal  SCL 70 negative  Smith antibody negative  SSA and SSB negative    1/19/2023  Creatinine 0.53, GFR greater than 90  Albumin 4.4  ALT 40, AST 24  CRP less than 3.0  TSH 0.55  CBC within normal limits  Sed rate 11    5/30/2023  Creatinine 0.55, GFR greater than 90  Albumin 4.5  ALT 31, AST 18  CBC within normal limits  TSH 4.40    10/24/2023  Creatinine 0.59, GFR greater than 90  Albumin 4.2  , AST 57  White blood cell count 7.3, hemoglobin 12.1, platelet count 208  RNP antibody negative    10/31/2023  Creatinine 0.56, GFR greater than 90  , AST 64  Hepatitis C nonreactive

## 2025-04-08 ENCOUNTER — OFFICE VISIT (OUTPATIENT)
Dept: RHEUMATOLOGY | Facility: CLINIC | Age: 35
End: 2025-04-08
Payer: COMMERCIAL

## 2025-04-08 VITALS
RESPIRATION RATE: 18 BRPM | HEART RATE: 63 BPM | TEMPERATURE: 97.1 F | OXYGEN SATURATION: 97 % | DIASTOLIC BLOOD PRESSURE: 76 MMHG | HEIGHT: 60 IN | BODY MASS INDEX: 41.46 KG/M2 | SYSTOLIC BLOOD PRESSURE: 117 MMHG | WEIGHT: 211.2 LBS

## 2025-04-08 DIAGNOSIS — M19.90 INFLAMMATORY ARTHRITIS: Primary | ICD-10-CM

## 2025-04-08 DIAGNOSIS — Z79.899 HIGH RISK MEDICATION USE: ICD-10-CM

## 2025-04-08 DIAGNOSIS — R76.8 POSITIVE ANA (ANTINUCLEAR ANTIBODY): ICD-10-CM

## 2025-04-08 LAB
ALBUMIN SERPL BCG-MCNC: 4.2 G/DL (ref 3.5–5.2)
ALT SERPL W P-5'-P-CCNC: 123 U/L (ref 0–50)
AST SERPL W P-5'-P-CCNC: 51 U/L (ref 0–45)
CREAT SERPL-MCNC: 0.5 MG/DL (ref 0.51–0.95)
CRP SERPL-MCNC: 3.2 MG/L
EGFRCR SERPLBLD CKD-EPI 2021: >90 ML/MIN/1.73M2
ERYTHROCYTE [DISTWIDTH] IN BLOOD BY AUTOMATED COUNT: 14.2 % (ref 10–15)
ERYTHROCYTE [SEDIMENTATION RATE] IN BLOOD BY WESTERGREN METHOD: 14 MM/HR (ref 0–20)
HCT VFR BLD AUTO: 43.3 % (ref 35–47)
HGB BLD-MCNC: 14.3 G/DL (ref 11.7–15.7)
MCH RBC QN AUTO: 28.6 PG (ref 26.5–33)
MCHC RBC AUTO-ENTMCNC: 33 G/DL (ref 31.5–36.5)
MCV RBC AUTO: 87 FL (ref 78–100)
PLATELET # BLD AUTO: 185 10E3/UL (ref 150–450)
RBC # BLD AUTO: 5 10E6/UL (ref 3.8–5.2)
WBC # BLD AUTO: 6.2 10E3/UL (ref 4–11)

## 2025-04-08 PROCEDURE — 82040 ASSAY OF SERUM ALBUMIN: CPT | Performed by: PHYSICIAN ASSISTANT

## 2025-04-08 PROCEDURE — 85652 RBC SED RATE AUTOMATED: CPT | Performed by: PHYSICIAN ASSISTANT

## 2025-04-08 PROCEDURE — 99214 OFFICE O/P EST MOD 30 MIN: CPT | Performed by: PHYSICIAN ASSISTANT

## 2025-04-08 PROCEDURE — 86140 C-REACTIVE PROTEIN: CPT | Performed by: PHYSICIAN ASSISTANT

## 2025-04-08 PROCEDURE — 82565 ASSAY OF CREATININE: CPT | Performed by: PHYSICIAN ASSISTANT

## 2025-04-08 PROCEDURE — 84460 ALANINE AMINO (ALT) (SGPT): CPT | Performed by: PHYSICIAN ASSISTANT

## 2025-04-08 PROCEDURE — G2211 COMPLEX E/M VISIT ADD ON: HCPCS | Performed by: PHYSICIAN ASSISTANT

## 2025-04-08 PROCEDURE — 36415 COLL VENOUS BLD VENIPUNCTURE: CPT | Performed by: PHYSICIAN ASSISTANT

## 2025-04-08 PROCEDURE — 85027 COMPLETE CBC AUTOMATED: CPT | Performed by: PHYSICIAN ASSISTANT

## 2025-04-08 PROCEDURE — 84450 TRANSFERASE (AST) (SGOT): CPT | Performed by: PHYSICIAN ASSISTANT

## 2025-04-08 RX ORDER — HYDROXYCHLOROQUINE SULFATE 200 MG/1
200 TABLET, FILM COATED ORAL DAILY
Qty: 90 TABLET | Refills: 0 | Status: SHIPPED | OUTPATIENT
Start: 2025-04-08

## 2025-04-08 ASSESSMENT — PAIN SCALES - GENERAL: PAINLEVEL_OUTOF10: NO PAIN (0)

## 2025-04-08 NOTE — PATIENT INSTRUCTIONS
After Visit Instructions:     Thank you for coming to [facility] for your care. It is my goal to partner with you to help you reach your optimal state of health.       Plan:     Schedule follow-up with Bre Chanel PA-C in 3 months.   Labs: CBC, creatinine, albumin, AST, ALT, CRP, Sed rate today, in 1 month and then every 3 months; RNP in 1 month  Medication recommendations:   Hydroxychloroquine 200mg: Take 1 tab daily  -While on hydroxychloroquine it is important that you get an eye exam annually (visual field and OCT). Make sure to let your eye provider know that you are taking this medication.   -Labs (AST/ALT, creatinine, CBC with platelets) should be monitored every 3-6 months  # Hydroxychloroquine (Plaquenil) Risks and Benefits:  The risks and benefits of hydroxychloroquine were discussed in detail and the patient verbalized understanding; the patient also verbalized agreement to get the required ophthalmologic toxicity monitoring.  The risks discussed include, but are not limited to, the risk for hypersensitivity, anaphylaxis, anaphylactoid reactions, irreversible retinal damage, rare hematologic reactions, and rare cardiomyopathy.  Patients with G6PD deficiency or hepatic impairment may be at an increased risk for adverse effects.  I encouraged reviewing the package insert and asking any questions about the medication.        Bre Chanel PA-C  [facility]  [facility]    Contact information: [facility]  Clinic Number:  [phone]  Please call or send a [facility] message with any questions about your care

## 2025-04-10 PROBLEM — M25.561 PAIN IN BOTH KNEES, UNSPECIFIED CHRONICITY: Status: RESOLVED | Noted: 2021-02-26 | Resolved: 2025-04-10

## 2025-04-10 PROBLEM — M25.562 PAIN IN BOTH KNEES, UNSPECIFIED CHRONICITY: Status: RESOLVED | Noted: 2021-02-26 | Resolved: 2025-04-10

## 2025-06-25 NOTE — PROGRESS NOTES
Rheumatology Clinic Visit  [facility]  RAOUL Kent     Denise Cazares MRN# [id number]   YOB: 1990 Age: 35 year old   Date of Visit: 7/8/2025  [facility]          Assessment and Plan:     1.  Inflammatory arthritis  2.  Positive YOSELIN  3. High risk medication use    Patient presents today for follow-up.  She reports that she does feel that there has been improvement since being on the hydroxychloroquine.  She is tolerating it.  She is also on some medications to help with weight loss and thinks this may also be helping.  At this time she would like to continue on her current regimen.  There may be additional benefit in the next 3 months as well.  We could consider trying to increase the hydroxychloroquine slightly but would want to be mindful as she did not have diarrhea on the dose of twice a day in the past.  She has not had any new symptoms.  She is due for labs and will have them done this week.  Offered to have them done this week but she stated that she needed to get to work.  She will make sure to get those scheduled to be completed.  She will follow-up with me in 3 months.      Plaquenil Eye Exam   Treatment started 4/8/25; no eye exam on file since that time    The longitudinal plan of care for the diagnosis(es)/condition(s) as documented were addressed during this visit. Due to the added complexity in care, I will continue to support Denise in the subsequent management and with ongoing continuity of care.    Plan:   Schedule follow-up with Bre Chanel PA-C in 3 months.   Labs: CBC, creatinine, albumin, AST, ALT, CRP, Sed rate, RNP antibody this week  Medication recommendations:   Continue Hydroxychloroquine 200mg: Take 1 tab daily  -While on hydroxychloroquine it is important that you get an eye exam annually (visual field and OCT). Make sure to let your eye provider know that you are taking this medication.   -Labs (AST/ALT,  "creatinine, CBC with platelets) should be monitored every 3-6 months    Bre Chanel, RAOUL  Rheumatology         History of Present Illness:   Denise Cazares presents for evaluation of polyarthralgia.     Rheumatological history:  Previous medications tried: Hydroxychloroquine (diarrhea at 2/day)  Current medications: none    Interval history July 8, 2025:  Joints are good for the most part. She will have to cover the left knee at time because it will get cold and achy. She does feel the medication is helping.      Interval history April 8, 2025:  She is getting more hand pain and knee pain. Her fingers may go numb with eating.     Interval history April 5, 2024:  She saw her PCP as she was having episodes of numbness in her hands. She had EMG and it showed carpal tunnel. She had a little knee pain this morning, but otherwise no joint pain. She has been having some shoulder pain. She has had this for \"years\". She has had imaging done that showed issues with her tendon or ligament. She cannot sleep on her right side due to the pain.If she does fall asleep she will wake up and the shoulder will be aching.     Interval history December 1, 2023:  She has been off the Hydroxychloroquine for about 3 months. She feels that she has been doing well enough to not need it. She tried taking 2/day but had diarrhea. She has noticed with the colder weather, increased knee pain. She has some aching in her hands with washing dishes with cold water. She has not had any swelling. She talked with her PCP regarding her liver function tests. US showed fatty liver. They discussed lifestyle changes. She has started seeing therapy to help work through everything with her daughter.       Interval history May 30, 2023:  She still has some pain in the left knee. At times she has a hard time walking, but overall she feels it is bearable. She does feel the Hydroxychloroquine is helping. She did have a week where she did not take it and had " "more pain. No skin rashes or GI upset. She did have some GI upset at the beginning, but she feels this has leveled off.     Interval history November 18, 2022:  She states that she spoke with her orthopedic provider and it did not think that anything needed to be done surgically.     She states that she has a hard time going up and down the stairs. She feels that her knee \"alexandru\". She woke up this morning with discomfort in her left knee. No skin rashes. No dry eyes or dry mouth. She has had some post-partum hair loss.     She states that her wrists have been okay. When she lays down and then goes to get up, she has pain in her heel, so she does limp until it resolves. She reports her  saying that she walks \"weird\". No Raynaud's. No shortness of breath, chest pain or pleurisy. No recent infections. No fevers.    HPI from Consult 07/25/2022:  She states that in April, she started to have pain in her left sciatic nerve. She then started to have pain on the top of her left foot by the ankle. She has a daughter with leukemia and was at the hospital. She then started to have her left knee swell. She had previously had swelling in ehr right knee with fluid removed. She got the liquid removed on the left. 1 week later, she needed it removed again. She then started to have pain in her wrists. She had a positive lyme and was given amoxicillin. She saw infectious disease but was told they were uncertain if she had lyme. 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 She had further blood showing antibodies for lyme. She feels that both of her knees are swollen and the bottom of her heel. She has pain when she first stands up on the heel and that resolves after walking a few steps. She states that it is hard to bend her knees in the morning. She also has hypermobile joints as well and there is question on if this is causing \"wear and tear\". She has seen orthopedics and PT was recommended. She is able to walk better " since completing the antibiotic, but she continues to feel that there is a lot of swelling in her joints. She feels that she is worse in the mornings after periods of rest. No skin rashes. She has some fatigue, but she feels this is related to all the stress with her daughter.     Her mother complains about pain in her hands but is unsure if there is RA. No family history of lupus. No personal or family history of psoriasis, ulcerative colitis or crohn's.          Review of Systems:     Constitutional: negative  Skin: negative  Eyes: negative  Ears/Nose/Throat: negative  Respiratory: No shortness of breath, dyspnea on exertion, cough, or hemoptysis  Cardiovascular: negative  Gastrointestinal: negative  Genitourinary: negative  Musculoskeletal: as above  Neurologic: negative  Psychiatric: negative  Hematologic/Lymphatic/Immunologic: negative  Endocrine: negative         Active Problem List:     Patient Active Problem List    Diagnosis Date Noted    Nexplanon insertion 12/18/2024     Priority: Medium     Nexplanon placement 12/18/24 by Jesi Arceo  NDC 05155-697-63  Lot [id number]  Expiration 09/2026      Chronic right shoulder pain 05/14/2024     Priority: Medium    Nexplanon in place 12/14/2021 LT Arm 12/14/2021     Priority: Medium     Nexplanon placed 12/14/2021  LOT# [id number]  Exp: 04/09/2024  NDC# 04232-891-97    Dinora Morrison on 12/14/2021 at 9:53 AM          Class 2 obesity in adult 10/12/2021     Priority: Medium    Hypothyroidism      Priority: Medium            Past Medical History:     Past Medical History:   Diagnosis Date    Anemia     Chickenpox     Endometrial polyp     Hypothyroidism      Past Surgical History:   Procedure Laterality Date    AS HYSTEROSCOPY W ENDOMETRIAL BX/POLYPECTOMY W/WO D&C      LAPAROSCOPIC CHOLECYSTECTOMY      TONSILLECTOMY              Social History:     Social History     Socioeconomic History    Marital status:      Spouse name: Not on file    Number of  children: Not on file    Years of education: Not on file    Highest education level: Not on file   Occupational History    Not on file   Tobacco Use    Smoking status: Never    Smokeless tobacco: Never   Vaping Use    Vaping status: Never Used   Substance and Sexual Activity    Alcohol use: Not Currently     Comment: occasionally-quit with pregnnacy    Drug use: Never    Sexual activity: Yes     Partners: Male     Birth control/protection: None     Comment: Dominick   Other Topics Concern    Not on file   Social History Narrative    Not on file     Social Drivers of Health     Financial Resource Strain: Low Risk  (4/18/2025)    Financial Resource Strain     Within the past 12 months, have you or your family members you live with been unable to get utilities (heat, electricity) when it was really needed?: No   Food Insecurity: Low Risk  (4/18/2025)    Food Insecurity     Within the past 12 months, did you worry that your food would run out before you got money to buy more?: No     Within the past 12 months, did the food you bought just not last and you didn t have money to get more?: No   Transportation Needs: Low Risk  (4/18/2025)    Transportation Needs     Within the past 12 months, has lack of transportation kept you from medical appointments, getting your medicines, non-medical meetings or appointments, work, or from getting things that you need?: No   Physical Activity: Sufficiently Active (4/18/2025)    Exercise Vital Sign     Days of Exercise per Week: 5 days     Minutes of Exercise per Session: 30 min   Stress: No Stress Concern Present (4/18/2025)    [employer] - Occupational Stress Questionnaire     Feeling of Stress : Not at all   Social Connections: Unknown (4/18/2025)    Social Connection and Isolation Panel [NHANES]     Frequency of Communication with Friends and Family: Not on file     Frequency of Social Gatherings with Friends and Family: Once a week     Attends Shinto  "Services: Not on file     Active Member of Clubs or Organizations: Not on file     Attends Club or Organization Meetings: Not on file     Marital Status: Not on file   Interpersonal Safety: Low Risk  (4/18/2025)    Interpersonal Safety     Do you feel physically and emotionally safe where you currently live?: Yes     Within the past 12 months, have you been hit, slapped, kicked or otherwise physically hurt by someone?: No     Within the past 12 months, have you been humiliated or emotionally abused in other ways by your partner or ex-partner?: No   Housing Stability: Low Risk  (4/18/2025)    Housing Stability     Do you have housing? : Yes     Are you worried about losing your housing?: No          Family History:     Family History   Problem Relation Age of Onset    Diabetes Mother     Hypertension Mother     No Known Problems Father     Other - See Comments Maternal Grandfather         MVA            Allergies:   No Known Allergies         Medications:     Current Outpatient Medications   Medication Sig Dispense Refill    etonogestrel (NEXPLANON) 68 MG IMPL 1 each (68 mg) by Subdermal route once      hydroxychloroquine (PLAQUENIL) 200 MG tablet Take 1 tablet (200 mg) by mouth daily. 90 tablet 0    levothyroxine (SYNTHROID/LEVOTHROID) 100 MCG tablet Take 1 tablet (100 mcg) by mouth daily. Please follow up with Primary Care for future refills. 90 tablet 3    Multiple Vitamins-Minerals (MULTIVITAMIN ADULTS PO) Take 1 tablet by mouth daily      phentermine 15 MG capsule Take 1 capsule (15 mg) by mouth every morning. 30 capsule 5    topiramate (TOPAMAX) 25 MG tablet Take 1 tablet (25 mg) by mouth daily. 30 tablet 5            Physical Exam:   Blood pressure 115/77, pulse 65, temperature 98.1  F (36.7  C), temperature source Tympanic, resp. rate 16, height 1.511 m (4' 11.49\"), weight 90.9 kg (200 lb 6.4 oz), last menstrual period 05/02/2025, SpO2 99%, not currently breastfeeding.  Wt Readings from Last 6 Encounters: "   05/16/25 92.2 kg (203 lb 3.2 oz)   04/18/25 95.7 kg (211 lb)   04/08/25 95.8 kg (211 lb 3.2 oz)   12/18/24 97.5 kg (215 lb)   04/05/24 98 kg (216 lb)   03/25/24 98.4 kg (217 lb)     Constitutional: well-developed, appearing stated age; cooperative  Eyes: nl  conjunctiva, sclera  ENT: nl external ears, nose, hearing, lips,   Resp: No shortness of breath with normal conversation    Psych: nl judgement, orientation, memory, affect.           Data:   Imaging:  MRI left knee 5/31/2022  Impression:   1. Nodular intra-articular foci as detailed above. Differential would   include infectious or inflammatory etiology. Nodular synovitis and/or   PVNS are also included in the differential. Consider referral to   orthopedic oncology at the [facility].     2. Repeat MRI with gadolinium and susceptibility weighted imaging   could be considered in 3-6 months.     Xray left foot 5/3/2022  IMPRESSION: No fractures are evident. Normal joint spacing and  alignment. The soft tissues are unremarkable.     MRI right knee 8/14/22  Impression: Large knee joint effusion with area of mild synovitis.  Given contralateral knee with possible synovitis finding, this may be  systemic condition. Especially with positive lyme antibodies,  differential consideration include lyme arthritis. No evidence of  erosion, reactive osteitis.   Ultrasound of her abdomen on 11/21/2023 shows hepatic steatosis and prior cholecystectomy    XR SHOULDER RIGHT 2 VIEWS  DATE/TIME: 4/5/2024 10:38 AM                                                            IMPRESSION: Normal joint spaces and alignment. No definite fracture.     Laboratory:  5/11/2022  Uric acid 2.1  Rheumatoid factor 7  YOSELIN negative    5/18/2022  CCP antibody 2.6  CBC within normal parameters    6/24/2022  TSH 3.9, T4 0.76    7/15/2022  Creatinine 0.56, GFR greater than 80  ALT 32, AST 20  CRP 6.3  Vitamin D 18  Sed rate 22  LYme confirm IgG reactive, 9.0      Fluid analysis:  5/16/2022: 6K WBC, 38% PMNs, 36% lymphocytes. Cultures on broth showed likely contaminate of diptheroids, globicatella sanuinis. Lyme analysis showed reactive IgG    8/11/2022   CRP less than 2.9  Rheumatoid factor 8  Sed rate 15  YOSELIN borderline positive with a speckled pattern and a titer of 1: 80    12/1/2022  Creatinine 0.49, GFR greater than 90  Albumin 3.9  ALT 92, AST 30  Syndrome antibody negative  CRP 3.7  CBC within normal limits  Sed rate 10  C3 141, C4 30  Double-stranded DNA negative  RNP equivocal  SCL 70 negative  Smith antibody negative  SSA and SSB negative    1/19/2023  Creatinine 0.53, GFR greater than 90  Albumin 4.4  ALT 40, AST 24  CRP less than 3.0  TSH 0.55  CBC within normal limits  Sed rate 11    5/30/2023  Creatinine 0.55, GFR greater than 90  Albumin 4.5  ALT 31, AST 18  CBC within normal limits  TSH 4.40    10/24/2023  Creatinine 0.59, GFR greater than 90  Albumin 4.2  , AST 57  White blood cell count 7.3, hemoglobin 12.1, platelet count 208  RNP antibody negative    10/31/2023  Creatinine 0.56, GFR greater than 90  , AST 64  Hepatitis C nonreactive    4/8/2025  Creatinine 0.50, GFR greater than 90  Albumin 4.2  , AST 51  CRP 3.2  White blood cell count 6.2, hemoglobin 14.3, platelet count 185  Sed rate 14

## 2025-07-08 ENCOUNTER — OFFICE VISIT (OUTPATIENT)
Dept: RHEUMATOLOGY | Facility: CLINIC | Age: 35
End: 2025-07-08
Payer: COMMERCIAL

## 2025-07-08 VITALS
HEIGHT: 59 IN | DIASTOLIC BLOOD PRESSURE: 77 MMHG | RESPIRATION RATE: 16 BRPM | WEIGHT: 200.4 LBS | SYSTOLIC BLOOD PRESSURE: 115 MMHG | BODY MASS INDEX: 40.4 KG/M2 | HEART RATE: 65 BPM | TEMPERATURE: 98.1 F | OXYGEN SATURATION: 99 %

## 2025-07-08 DIAGNOSIS — Z79.899 HIGH RISK MEDICATION USE: ICD-10-CM

## 2025-07-08 DIAGNOSIS — R76.8 POSITIVE ANA (ANTINUCLEAR ANTIBODY): ICD-10-CM

## 2025-07-08 DIAGNOSIS — M19.90 INFLAMMATORY ARTHRITIS: ICD-10-CM

## 2025-07-08 PROCEDURE — G2211 COMPLEX E/M VISIT ADD ON: HCPCS | Performed by: PHYSICIAN ASSISTANT

## 2025-07-08 PROCEDURE — 99214 OFFICE O/P EST MOD 30 MIN: CPT | Performed by: PHYSICIAN ASSISTANT

## 2025-07-08 RX ORDER — HYDROXYCHLOROQUINE SULFATE 200 MG/1
200 TABLET, FILM COATED ORAL DAILY
Qty: 90 TABLET | Refills: 0 | Status: SHIPPED | OUTPATIENT
Start: 2025-07-08

## 2025-07-08 ASSESSMENT — PAIN SCALES - GENERAL: PAINLEVEL_OUTOF10: MILD PAIN (2)

## 2025-07-08 NOTE — PATIENT INSTRUCTIONS
After Visit Instructions:     Thank you for coming to [facility] for your care. It is my goal to partner with you to help you reach your optimal state of health.       Plan:     Schedule follow-up with Bre Chanel PA-C in 3 months.   Labs: CBC, creatinine, albumin, AST, ALT, CRP, Sed rate, RNP antibody this week  Medication recommendations:   Continue Hydroxychloroquine 200mg: Take 1 tab daily  -While on hydroxychloroquine it is important that you get an eye exam annually (visual field and OCT). Make sure to let your eye provider know that you are taking this medication.   -Labs (AST/ALT, creatinine, CBC with platelets) should be monitored every 3-6 months     Bre Chanel PA-C  [facility]  [facility]    Contact information: [facility]  Clinic Number:  [phone]  Please call or send a [employer] message with any questions about your care

## 2025-07-23 ENCOUNTER — LAB (OUTPATIENT)
Dept: LAB | Facility: CLINIC | Age: 35
End: 2025-07-23
Payer: COMMERCIAL

## 2025-07-23 DIAGNOSIS — M19.90 INFLAMMATORY ARTHRITIS: ICD-10-CM

## 2025-07-23 DIAGNOSIS — Z79.899 HIGH RISK MEDICATION USE: ICD-10-CM

## 2025-07-23 DIAGNOSIS — R76.8 POSITIVE ANA (ANTINUCLEAR ANTIBODY): ICD-10-CM

## 2025-07-23 LAB
ALBUMIN SERPL BCG-MCNC: 4.5 G/DL (ref 3.5–5.2)
ALT SERPL W P-5'-P-CCNC: 36 U/L (ref 0–50)
AST SERPL W P-5'-P-CCNC: 20 U/L (ref 0–45)
CREAT SERPL-MCNC: 0.64 MG/DL (ref 0.51–0.95)
CRP SERPL-MCNC: <3 MG/L
EGFRCR SERPLBLD CKD-EPI 2021: >90 ML/MIN/1.73M2
ERYTHROCYTE [DISTWIDTH] IN BLOOD BY AUTOMATED COUNT: 13.4 % (ref 10–15)
ERYTHROCYTE [SEDIMENTATION RATE] IN BLOOD BY WESTERGREN METHOD: 11 MM/HR (ref 0–20)
HCT VFR BLD AUTO: 45 % (ref 35–47)
HGB BLD-MCNC: 15.1 G/DL (ref 11.7–15.7)
MCH RBC QN AUTO: 28.8 PG (ref 26.5–33)
MCHC RBC AUTO-ENTMCNC: 33.6 G/DL (ref 31.5–36.5)
MCV RBC AUTO: 86 FL (ref 78–100)
PLATELET # BLD AUTO: 224 10E3/UL (ref 150–450)
RBC # BLD AUTO: 5.24 10E6/UL (ref 3.8–5.2)
WBC # BLD AUTO: 5.4 10E3/UL (ref 4–11)

## 2025-07-23 PROCEDURE — 84450 TRANSFERASE (AST) (SGOT): CPT

## 2025-07-23 PROCEDURE — 82040 ASSAY OF SERUM ALBUMIN: CPT

## 2025-07-23 PROCEDURE — 85027 COMPLETE CBC AUTOMATED: CPT

## 2025-07-23 PROCEDURE — 84460 ALANINE AMINO (ALT) (SGPT): CPT

## 2025-07-23 PROCEDURE — 85652 RBC SED RATE AUTOMATED: CPT

## 2025-07-23 PROCEDURE — 36415 COLL VENOUS BLD VENIPUNCTURE: CPT

## 2025-07-23 PROCEDURE — 82565 ASSAY OF CREATININE: CPT

## 2025-07-23 PROCEDURE — 86140 C-REACTIVE PROTEIN: CPT

## 2025-07-24 LAB
U1 SNRNP IGG SER IA-ACNC: 2.5 U/ML
U1 SNRNP IGG SER IA-ACNC: NEGATIVE